# Patient Record
Sex: MALE | Race: WHITE | Employment: FULL TIME | URBAN - METROPOLITAN AREA
[De-identification: names, ages, dates, MRNs, and addresses within clinical notes are randomized per-mention and may not be internally consistent; named-entity substitution may affect disease eponyms.]

---

## 2011-05-10 LAB — PSA, EXTERNAL: 4.3

## 2017-01-30 DIAGNOSIS — G47.50 PARASOMNIA: ICD-10-CM

## 2017-01-30 RX ORDER — CLONAZEPAM 1 MG/1
1 TABLET ORAL
Qty: 90 TAB | Refills: 0 | Status: SHIPPED | OUTPATIENT
Start: 2017-01-30 | End: 2017-04-21 | Stop reason: SDUPTHER

## 2017-03-19 DIAGNOSIS — I10 BENIGN ESSENTIAL HTN: ICD-10-CM

## 2017-03-21 RX ORDER — LISINOPRIL 5 MG/1
5 TABLET ORAL DAILY
Qty: 90 TAB | Refills: 1 | Status: SHIPPED | OUTPATIENT
Start: 2017-03-21 | End: 2017-09-20 | Stop reason: SDUPTHER

## 2017-03-21 NOTE — TELEPHONE ENCOUNTER
From: Frank Saunders  To: Myranda Sndier MD  Sent: 3/19/2017 10:22 AM EDT  Subject: Medication Renewal Request    Original authorizing provider: MD Frank Paige would like a refill of the following medications:  lisinopril (PRINIVIL, ZESTRIL) 5 mg tablet Myranda Snider MD]    Preferred pharmacy: [address] [place] [address] [address]    Comment:  Dr. Kwabena Reyes, I lost track and have about two weeks worth of Cobos Beavers left, can I get a new script put in through [facility]? Most of my other prescriptions have no refills left but I have time for them. One of them is the Crestor, but I will be doing my blood test shortly and then I will make a follow up appointment with you after the cholesterol test is back.

## 2017-03-31 ENCOUNTER — HOSPITAL ENCOUNTER (OUTPATIENT)
Dept: LAB | Age: 62
Discharge: HOME | End: 2017-03-31
Payer: OTHER GOVERNMENT

## 2017-03-31 DIAGNOSIS — E78.2 MIXED HYPERCHOLESTEROLEMIA AND HYPERTRIGLYCERIDEMIA: ICD-10-CM

## 2017-03-31 DIAGNOSIS — R97.20 ELEVATED PSA: ICD-10-CM

## 2017-03-31 LAB
CHOLEST SERPL-MCNC: 201 MG/DL
HDLC SERPL-MCNC: 58 MG/DL (ref 40–60)
HDLC SERPL: 3.5 {RATIO} (ref 0–5)
LDLC SERPL CALC-MCNC: 113.4 MG/DL (ref 0–100)
LIPID PROFILE,FLP: ABNORMAL
TRIGL SERPL-MCNC: 148 MG/DL (ref ?–150)
VLDLC SERPL CALC-MCNC: 29.6 MG/DL

## 2017-03-31 PROCEDURE — 36415 COLL VENOUS BLD VENIPUNCTURE: CPT | Performed by: INTERNAL MEDICINE

## 2017-03-31 PROCEDURE — 80061 LIPID PANEL: CPT | Performed by: INTERNAL MEDICINE

## 2017-03-31 PROCEDURE — 84153 ASSAY OF PSA TOTAL: CPT | Performed by: INTERNAL MEDICINE

## 2017-03-31 PROCEDURE — 84154 ASSAY OF PSA FREE: CPT | Performed by: INTERNAL MEDICINE

## 2017-04-01 LAB
% FREE PSA, 480797: 15.9 %
PSA SERPL-MCNC: 9.3 NG/ML (ref 0–4)
PSA, FREE, 480853: 1.48 NG/ML
REFLEX CRITERIA: ABNORMAL

## 2017-04-10 RX ORDER — FLUTICASONE PROPIONATE 50 MCG
SPRAY, SUSPENSION (ML) NASAL
Qty: 48 G | Refills: 2 | Status: SHIPPED | OUTPATIENT
Start: 2017-04-10 | End: 2018-01-05 | Stop reason: SDUPTHER

## 2017-04-21 ENCOUNTER — OFFICE VISIT (OUTPATIENT)
Dept: FAMILY MEDICINE CLINIC | Age: 62
End: 2017-04-21

## 2017-04-21 VITALS
SYSTOLIC BLOOD PRESSURE: 112 MMHG | WEIGHT: 253.8 LBS | BODY MASS INDEX: 29.97 KG/M2 | HEART RATE: 76 BPM | DIASTOLIC BLOOD PRESSURE: 78 MMHG | RESPIRATION RATE: 14 BRPM | TEMPERATURE: 98.1 F | HEIGHT: 77 IN | OXYGEN SATURATION: 97 %

## 2017-04-21 DIAGNOSIS — R09.81 NASAL CONGESTION: ICD-10-CM

## 2017-04-21 DIAGNOSIS — E78.2 MIXED HYPERCHOLESTEROLEMIA AND HYPERTRIGLYCERIDEMIA: ICD-10-CM

## 2017-04-21 DIAGNOSIS — I10 BENIGN ESSENTIAL HTN: Primary | ICD-10-CM

## 2017-04-21 DIAGNOSIS — G47.50 PARASOMNIA: ICD-10-CM

## 2017-04-21 RX ORDER — CLONAZEPAM 1 MG/1
1 TABLET ORAL
Qty: 90 TAB | Refills: 0 | Status: SHIPPED | OUTPATIENT
Start: 2017-04-21 | End: 2017-07-26 | Stop reason: SDUPTHER

## 2017-04-21 RX ORDER — ESOMEPRAZOLE MAGNESIUM 40 MG/1
40 CAPSULE, DELAYED RELEASE ORAL DAILY
Qty: 90 CAP | Refills: 3 | Status: SHIPPED | OUTPATIENT
Start: 2017-04-21 | End: 2017-05-19 | Stop reason: CLARIF

## 2017-04-21 NOTE — MR AVS SNAPSHOT
Visit Information Date & Time Provider Department Dept. Phone Encounter #  
 4/21/2017  8:30 AM Anna Vasquez MD [address] [place] [address] [phone] [id number]  
  
 4/21/2017  1:30 PM  
Any with Mireya Cintron MD  
[facility] of [facility]) Appt Note: Return in about 6 months (around 4/21/2017) for Elevated PSA. [address] [address]  
[phone]  
  
   
 [address] [place] [address] Upcoming Health Maintenance Date Due Hepatitis C Screening 1955 COLONOSCOPY 6/1/2017 DTaP/Tdap/Td series (2 - Td) 1/1/2025 Allergies as of 4/21/2017  Review Complete On: 4/21/2017 By: Anna Vasquez MD  
  
 Severity Noted Reaction Type Reactions Sulfa (Sulfonamide Antibiotics)  05/04/2012    Itching Current Immunizations  Never Reviewed Name Date Influenza Vaccine 10/10/2015 Not reviewed this visit You Were Diagnosed With   
  
 Codes Comments Benign essential HTN    -  Primary ICD-10-CM: I10 
ICD-9-CM: 401.1 Parasomnia     ICD-10-CM: G47.50 ICD-9-CM: 307.47 Mixed hypercholesterolemia and hypertriglyceridemia     ICD-10-CM: E78.2 ICD-9-CM: 272.2 Vitals BP Pulse Temp Resp Height(growth percentile) Weight(growth percentile) 112/78 76 98.1 °F (36.7 °C) 14 6' 5\" (1.956 m) 253 lb 12.8 oz (115.1 kg) SpO2 BMI Smoking Status 97% 30.1 kg/m2 Former Smoker Vitals History BMI and BSA Data Body Mass Index Body Surface Area  
 30.1 kg/m 2 2.5 m 2 Preferred Pharmacy Pharmacy Name Phone [address] [facility] [phone] Your Updated Medication List  
  
   
This list is accurate as of: 4/21/17  8:40 AM.  Always use your most recent med list.  
  
  
  
  
 aspirin 81 mg tablet Take 81 mg by mouth. CENTRUM SILVER PO Take  by mouth. CIALIS 20 mg tablet Generic drug:  tadalafil Take 20 mg by mouth as needed. clonazePAM 1 mg tablet Commonly known as:  Shelba Hock Take 1 Tab by mouth nightly. Max Daily Amount: 1 mg. CRESTOR 20 mg tablet Generic drug:  rosuvastatin TAKE 1 TABLET NIGHTLY  
  
 cyclobenzaprine 10 mg tablet Commonly known as:  FLEXERIL Take 1 Tab by mouth three (3) times daily as needed for Muscle Spasm(s). Indications: MUSCLE SPASM  
  
 esomeprazole 40 mg capsule Commonly known as:  NexIUM Take 1 Cap by mouth daily. FLOMAX 0.4 mg capsule Generic drug:  tamsulosin Take 0.4 mg by mouth daily. fluticasone 50 mcg/actuation nasal spray Commonly known as:  FLONASE  
USE 2 SPRAYS IN EACH NOSTRIL DAILY HYDROcodone-acetaminophen 5-325 mg per tablet Commonly known as:  Olesya Holiday Take 1 Tab by mouth daily as needed for Pain. ibuprofen 800 mg tablet Commonly known as:  MOTRIN Take 1 Tab by mouth every eight (8) hours as needed for Pain.  
  
 ipratropium 0.06 % nasal spray Commonly known as:  ATROVENT  
2 Sprays by Both Nostrils route four (4) times daily as needed for Rhinitis. lisinopril 5 mg tablet Commonly known as:  Steve Bold Take 1 Tab by mouth daily. triamterene-hydroCHLOROthiazide 75-50 mg per tablet Commonly known as:  Shelbiana Muscat TAKE 1 TABLET DAILY Prescriptions Printed Refills  
 clonazePAM (KLONOPIN) 1 mg tablet 0 Sig: Take 1 Tab by mouth nightly. Max Daily Amount: 1 mg. Class: Print Route: Oral  
  
Prescriptions Sent to Pharmacy Refills  
 esomeprazole (NEXIUM) 40 mg capsule 3 Sig: Take 1 Cap by mouth daily. Class: Normal  
 Pharmacy: [address], [address] [place] [address] #: [phone] Route: Oral  
  
We Performed the Following SLEEP MEDICINE REFERRAL [MCA220 Custom] Comments:  
 Please evaluate patient for parasomnia. Referral Information Referral ID Referred By Referred To  
  
 [id number] [facility]   
   [address] [facility] [address] [place] [address] Phone: [phone] Fax: [phone] Visits Status Start Date End Date 1 New Request 4/21/17 4/21/18 If your referral has a status of pending review or denied, additional information will be sent to support the outcome of this decision. Introducing [facility] & HEALTH SERVICES! Dear Abdirahman Rose: Thank you for requesting a [employer] account. Our records indicate that you already have an active [employer] account. You can access your account anytime at [email or web address] Did you know that you can access your hospital and ER discharge instructions at any time in [employer]? You can also review all of your test results from your hospital stay or ER visit. Additional Information If you have questions, please visit the Frequently Asked Questions section of the [employer] website at [email or web address] Remember, [employer] is NOT to be used for urgent needs. For medical emergencies, dial 911. Now available from your iPhone and Android! Please provide this summary of care documentation to your next provider. Your primary care clinician is listed as Tree Morocho. If you have any questions after today's visit, please call [phone].

## 2017-04-21 NOTE — PROGRESS NOTES
Assessment/Plan:    1. Benign essential HTN  -cont current regimen. 2. Parasomnia  [place]  reviewed and is in accordance with patient's prescriptions   - clonazePAM (KLONOPIN) 1 mg tablet; Take 1 Tab by mouth nightly. Max Daily Amount: 1 mg. Dispense: 90 Tab; Refill: 0  - SLEEP MEDICINE REFERRAL    3. Mixed hypercholesterolemia and hypertriglyceridemia  -cont crestor. The plan was discussed with the patient. The patient verbalized understanding and is in agreement with the plan. All medication potential side effects were discussed with the patient. Health Maintenance:   Health Maintenance   Topic Date Due    Hepatitis C Screening  1955    COLONOSCOPY  06/01/2017    DTaP/Tdap/Td series (2 - Td) 01/01/2025    ZOSTER VACCINE AGE 60>  Completed    INFLUENZA AGE 9 TO ADULT  Completed       Joey Son is a 64 y.o. male and presents with Follow-up     Subjective:  HTN - bp controlled. He has started apple cider vinegar daily and thinks this is helping his bp. HLD -on crestor. No side effects. Take klonopin for parasomnia (aggressive behavior). He is requesting sleep med referral.    ROS:  Constitutional: No recent weight change. No weakness/fatigue. No f/c. Cardiovascular: No CP/palpitations. No RASCON/orthopnea/PND. Respiratory: No cough/sputum, dyspnea, wheezing. Gastointestinal: No dysphagia, reflux. No n/v. No constipation/diarrhea. No melena/rectal bleeding. Genitourinary: No dysuria, urinary hesitancy, nocturia, hematuria. No incontinence. The problem list was updated as a part of today's visit.   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 SH:  Social History   Substance Use Topics    Smoking status: Former Smoker     Quit date: 4/27/1989    Smokeless tobacco: Never Used    Alcohol use 20.4 oz/week     14 Glasses of wine, 14 Cans of beer, 6 Standard drinks or equivalent per week       Medications/Allergies:  Current Outpatient Prescriptions on File Prior to Visit   Medication Sig Dispense Refill    fluticasone (FLONASE) 50 mcg/actuation nasal spray USE 2 SPRAYS IN EACH NOSTRIL DAILY 48 g 2    CRESTOR 20 mg tablet TAKE 1 TABLET NIGHTLY 90 Tab 2    lisinopril (PRINIVIL, ZESTRIL) 5 mg tablet Take 1 Tab by mouth daily. 90 Tab 1    triamterene-hydroCHLOROthiazide (MAXZIDE) 75-50 mg per tablet TAKE 1 TABLET DAILY 90 Tab 1    clonazePAM (KLONOPIN) 1 mg tablet Take 1 Tab by mouth nightly. Max Daily Amount: 1 mg. 90 Tab 0    NEXIUM 40 mg capsule       HYDROcodone-acetaminophen (NORCO) 5-325 mg per tablet Take 1 Tab by mouth daily as needed for Pain. 12 Tab 0    ibuprofen (MOTRIN) 800 mg tablet Take 1 Tab by mouth every eight (8) hours as needed for Pain. 90 Tab 0    cyclobenzaprine (FLEXERIL) 10 mg tablet Take 1 Tab by mouth three (3) times daily as needed for Muscle Spasm(s). Indications: MUSCLE SPASM 90 Tab 0    ipratropium (ATROVENT) 0.06 % nasal spray 2 Sprays by Both Nostrils route four (4) times daily as needed for Rhinitis. 45 mL 1    tamsulosin (FLOMAX) 0.4 mg capsule Take 0.4 mg by mouth daily.  tadalafil (CIALIS) 20 mg tablet Take 20 mg by mouth as needed.  MULTIVITAMIN W-MINERALS/LUTEIN (CENTRUM SILVER PO) Take  by mouth.  aspirin 81 mg tablet Take 81 mg by mouth. No current facility-administered medications on file prior to visit.          Allergies   Allergen Reactions    Sulfa (Sulfonamide Antibiotics) Itching       Objective:  Visit Vitals    Pulse 76    Temp 98.1 °F (36.7 °C)    Resp 14  Ht 6' 5\" (1.956 m)    Wt 253 lb 12.8 oz (115.1 kg)    SpO2 97%    BMI 30.1 kg/m2      Constitutional: Well developed, nourished, no distress, alert, obese habitus   CV: S1, S2.  RRR. No murmurs/rubs. No thrills palpated. No carotid bruits. Intact distal pulses. No edema. Pulm: No abnormalities on inspection. Clear to auscultation bilaterally. No wheezing/rhonchi. Normal effort. GI: Soft, nontender, nondistended. Normal active bowel sounds. Neuro: A/O x 3. No focal motor or sensory deficits. Speech normal.   Psych: Appropriate affect, judgement and insight. Short-term memory intact. Labwork and Ancillary Studies:    CBC w/Diff  Lab Results   Component Value Date/Time    WBC 7.6 10/14/2016 08:29 AM    HGB 15.9 10/14/2016 08:29 AM    PLATELET 361 22/31/4987 08:29 AM         Basic Metabolic Profile/LFTs  Lab Results   Component Value Date/Time    Sodium 140 10/14/2016 08:29 AM    Potassium 4.0 10/14/2016 08:29 AM    Chloride 102 10/14/2016 08:29 AM    CO2 30 10/14/2016 08:29 AM    Anion gap 8 10/14/2016 08:29 AM    Glucose 95 10/14/2016 08:29 AM    BUN 24 10/14/2016 08:29 AM    Creatinine 1.29 10/14/2016 08:29 AM    BUN/Creatinine ratio 19 10/14/2016 08:29 AM    GFR est AA >60 10/14/2016 08:29 AM    GFR est non-AA 57 10/14/2016 08:29 AM    Calcium 9.1 10/14/2016 08:29 AM      Lab Results   Component Value Date/Time    ALT (SGPT) 40 10/14/2016 08:29 AM    AST (SGOT) 25 10/14/2016 08:29 AM    Alk.  phosphatase 73 10/14/2016 08:29 AM    Bilirubin, total 0.6 10/14/2016 08:29 AM       Cholesterol  Lab Results   Component Value Date/Time    Cholesterol, total 201 03/31/2017 08:02 AM    HDL Cholesterol 58 03/31/2017 08:02 AM    LDL, calculated 113.4 03/31/2017 08:02 AM    Triglyceride 148 03/31/2017 08:02 AM    CHOL/HDL Ratio 3.5 03/31/2017 08:02 AM

## 2017-04-21 NOTE — PROGRESS NOTES
Rajesh Bowling is a 64 y.o. male here today for follow-up. 1. Have you been to the ER, urgent care clinic since your last visit? Hospitalized since your last visit? No    2. Have you seen or consulted any other health care providers outside of the [address] [place] [address] since your last visit? Include any pap smears or colon screening.  No

## 2017-05-19 RX ORDER — PANTOPRAZOLE SODIUM 40 MG/1
40 TABLET, DELAYED RELEASE ORAL DAILY
Qty: 90 TAB | Refills: 3 | Status: SHIPPED | OUTPATIENT
Start: 2017-05-19 | End: 2018-04-26 | Stop reason: SDUPTHER

## 2017-07-26 DIAGNOSIS — G47.50 PARASOMNIA: ICD-10-CM

## 2017-07-26 RX ORDER — CLONAZEPAM 1 MG/1
1 TABLET ORAL
Qty: 90 TAB | Refills: 0 | Status: SHIPPED | OUTPATIENT
Start: 2017-07-26 | End: 2017-09-20 | Stop reason: SDUPTHER

## 2017-07-26 NOTE — TELEPHONE ENCOUNTER
From: Sarah Maillard  To: David Lott MD  Sent: 7/26/2017 2:13 PM EDT  Subject: Medication Renewal Request    Original authorizing provider: MD Vance Santos Randal. Katia Gaxiola would like a refill of the following medications:  clonazePAM (KLONOPIN) 1 mg tablet David Lott MD]    Preferred pharmacy: [place]    Comment:  Dr Ida Reddy, I am down to about 10 days worth of this script. Can you refill through [facility]?

## 2017-08-29 DIAGNOSIS — Z11.59 SPECIAL SCREENING EXAMINATION FOR VIRAL DISEASE: ICD-10-CM

## 2017-08-29 DIAGNOSIS — R97.20 ELEVATED PROSTATE SPECIFIC ANTIGEN (PSA): Primary | ICD-10-CM

## 2017-08-29 DIAGNOSIS — E78.2 MIXED HYPERCHOLESTEROLEMIA AND HYPERTRIGLYCERIDEMIA: ICD-10-CM

## 2017-09-01 ENCOUNTER — HOSPITAL ENCOUNTER (OUTPATIENT)
Dept: LAB | Age: 62
Discharge: HOME | End: 2017-09-01
Payer: OTHER GOVERNMENT

## 2017-09-01 DIAGNOSIS — Z11.59 SPECIAL SCREENING EXAMINATION FOR VIRAL DISEASE: ICD-10-CM

## 2017-09-01 DIAGNOSIS — E78.2 MIXED HYPERCHOLESTEROLEMIA AND HYPERTRIGLYCERIDEMIA: ICD-10-CM

## 2017-09-01 LAB
CHOLEST SERPL-MCNC: 204 MG/DL
HDLC SERPL-MCNC: 61 MG/DL (ref 40–60)
HDLC SERPL: 3.3 {RATIO} (ref 0–5)
LDLC SERPL CALC-MCNC: 98 MG/DL (ref 0–100)
LIPID PROFILE,FLP: ABNORMAL
TRIGL SERPL-MCNC: 225 MG/DL (ref ?–150)
VLDLC SERPL CALC-MCNC: 45 MG/DL

## 2017-09-01 PROCEDURE — 36415 COLL VENOUS BLD VENIPUNCTURE: CPT | Performed by: INTERNAL MEDICINE

## 2017-09-01 PROCEDURE — 80061 LIPID PANEL: CPT | Performed by: INTERNAL MEDICINE

## 2017-09-01 PROCEDURE — 84153 ASSAY OF PSA TOTAL: CPT | Performed by: INTERNAL MEDICINE

## 2017-09-01 PROCEDURE — 86803 HEPATITIS C AB TEST: CPT | Performed by: INTERNAL MEDICINE

## 2017-09-02 LAB
HCV AB S/CO SERPL IA: <0.1 S/CO RATIO (ref 0–0.9)
HCV AB SERPL QL IA: NORMAL
PSA SERPL-MCNC: 10.8 NG/ML (ref 0–4)
REFLEX CRITERIA: ABNORMAL

## 2017-09-20 ENCOUNTER — OFFICE VISIT (OUTPATIENT)
Dept: FAMILY MEDICINE CLINIC | Age: 62
End: 2017-09-20

## 2017-09-20 VITALS
WEIGHT: 258.2 LBS | RESPIRATION RATE: 18 BRPM | HEIGHT: 77 IN | HEART RATE: 73 BPM | TEMPERATURE: 98.2 F | DIASTOLIC BLOOD PRESSURE: 70 MMHG | SYSTOLIC BLOOD PRESSURE: 132 MMHG | OXYGEN SATURATION: 97 % | BODY MASS INDEX: 30.49 KG/M2

## 2017-09-20 DIAGNOSIS — G89.29 CHRONIC BILATERAL LOW BACK PAIN WITHOUT SCIATICA: ICD-10-CM

## 2017-09-20 DIAGNOSIS — G47.50 PARASOMNIA: Primary | ICD-10-CM

## 2017-09-20 DIAGNOSIS — M54.50 CHRONIC BILATERAL LOW BACK PAIN WITHOUT SCIATICA: ICD-10-CM

## 2017-09-20 DIAGNOSIS — Z12.11 SCREEN FOR COLON CANCER: ICD-10-CM

## 2017-09-20 DIAGNOSIS — M54.2 NECK PAIN: ICD-10-CM

## 2017-09-20 DIAGNOSIS — I10 BENIGN ESSENTIAL HTN: ICD-10-CM

## 2017-09-20 DIAGNOSIS — E78.2 MIXED HYPERCHOLESTEROLEMIA AND HYPERTRIGLYCERIDEMIA: ICD-10-CM

## 2017-09-20 RX ORDER — LISINOPRIL 5 MG/1
5 TABLET ORAL DAILY
Qty: 90 TAB | Refills: 1 | Status: SHIPPED | OUTPATIENT
Start: 2017-09-20 | End: 2018-01-30 | Stop reason: SDUPTHER

## 2017-09-20 RX ORDER — CLONAZEPAM 0.5 MG/1
0.5 TABLET ORAL
Qty: 90 TAB | Refills: 1
Start: 2017-11-09 | End: 2018-01-08 | Stop reason: SDUPTHER

## 2017-09-20 RX ORDER — CLONAZEPAM 1 MG/1
1 TABLET ORAL
Qty: 90 TAB | Refills: 1 | Status: SHIPPED | OUTPATIENT
Start: 2017-09-20 | End: 2017-09-20 | Stop reason: SDUPTHER

## 2017-09-20 RX ORDER — HYDROCODONE BITARTRATE AND ACETAMINOPHEN 5; 325 MG/1; MG/1
1 TABLET ORAL
Qty: 30 TAB | Refills: 0 | Status: SHIPPED | OUTPATIENT
Start: 2017-09-20 | End: 2017-12-19

## 2017-09-20 RX ORDER — CYCLOBENZAPRINE HCL 10 MG
10 TABLET ORAL
Qty: 90 TAB | Refills: 0 | Status: SHIPPED | OUTPATIENT
Start: 2017-09-20 | End: 2018-07-30 | Stop reason: SDUPTHER

## 2017-09-20 NOTE — PROGRESS NOTES
Kisha Munoz is a 64 y.o. male (: 1955) presenting to address:Medication evaluation and refills    Chief Complaint   Patient presents with    Medication Refill    Medication Evaluation       Vitals:    17 1056   BP: 132/70   Pulse: 73   Resp: 18   Temp: 98.2 °F (36.8 °C)   TempSrc: Oral   SpO2: 97%   Weight: 258 lb 3.2 oz (117.1 kg)   Height: 6' 5\" (1.956 m)   PainSc:   2   PainLoc: Back       Hearing/Vision:   No exam data present    Learning Assessment:     Learning Assessment 2015   PRIMARY LEARNER Patient   HIGHEST LEVEL OF EDUCATION - PRIMARY LEARNER  SOME COLLEGE   PRIMARY LANGUAGE ENGLISH   LEARNER PREFERENCE PRIMARY LISTENING   ANSWERED BY patient   RELATIONSHIP SELF     Depression Screening:     PHQ over the last two weeks 2017   Little interest or pleasure in doing things Not at all   Feeling down, depressed or hopeless Not at all   Total Score PHQ 2 0     Fall Risk Assessment:   No flowsheet data found. Abuse Screening:   No flowsheet data found. Coordination of Care Questionaire:   1. Have you been to the ER, urgent care clinic since your last visit? Hospitalized since your last visit? no    2. Have you seen or consulted any other health care providers outside of the [address] [place] [address] since your last visit? Include any pap smears or colon screening. ENT, sleep specialist    Advanced Directive:   1. Do you have an Advanced Directive? no    2. Would you like information on Advanced Directives?  No

## 2017-09-20 NOTE — MR AVS SNAPSHOT
Visit Information Date & Time Provider Department Dept. Phone Encounter #  
 9/20/2017 11:15 AM Jeny Andrade MD [facility] [phone] [id number]  
  
 4/20/2018  1:00 PM  
Any with Mohit Andrade MD  
[facility] of [facility]) Appt Note: Return in about 1 year (around 4/21/2018) for Elevated PSA -- PSA 1 wk prior to f/u.  
 [address] [place] [address]  
[phone]  
  
   
 [address] [address] Upcoming Health Maintenance Date Due COLONOSCOPY 6/1/2017 DTaP/Tdap/Td series (2 - Td) 1/1/2025 Allergies as of 9/20/2017  Review Complete On: 9/20/2017 By: Jeny Andrade MD  
  
 Severity Noted Reaction Type Reactions Sulfa (Sulfonamide Antibiotics)  05/04/2012    Itching Current Immunizations  Never Reviewed Name Date Influenza Vaccine 9/12/2017, 10/10/2015 Not reviewed this visit You Were Diagnosed With   
  
 Codes Comments Parasomnia    -  Primary ICD-10-CM: G55.47 ICD-9-CM: 307.47 Mixed hypercholesterolemia and hypertriglyceridemia     ICD-10-CM: E78.2 ICD-9-CM: 272.2 Benign essential HTN     ICD-10-CM: I10 
ICD-9-CM: 515. 1 Chronic bilateral low back pain without sciatica     ICD-10-CM: M54.5, G89.29 ICD-9-CM: 724.2, 338.29 Neck pain     ICD-10-CM: M54.2 ICD-9-CM: 723.1 Screen for colon cancer     ICD-10-CM: Z12.11 ICD-9-CM: V76.51 Vitals BP Pulse Temp Resp Height(growth percentile) Weight(growth percentile) 132/70 (BP 1 Location: Left arm, BP Patient Position: Sitting) 73 98.2 °F (36.8 °C) (Oral) 18 6' 5\" (1.956 m) 258 lb 3.2 oz (117.1 kg) SpO2 BMI Smoking Status 97% 30.62 kg/m2 Former Smoker BMI and BSA Data Body Mass Index Body Surface Area  
 30.62 kg/m 2 2.52 m 2 Preferred Pharmacy Pharmacy Name Phone  [address] [phone] Your Updated Medication List  
  
   
This list is accurate as of: 9/20/17 11:29 AM.  Always use your most recent med list.  
  
  
  
  
 aspirin 81 mg tablet Take 81 mg by mouth. CENTRUM SILVER PO Take  by mouth. CIALIS 20 mg tablet Generic drug:  tadalafil Take 20 mg by mouth as needed. clonazePAM 0.5 mg tablet Commonly known as:  Adrianna Marquezald Take 1 Tab by mouth nightly. Max Daily Amount: 0.5 mg.  
Start taking on:  11/9/2017 CRESTOR 20 mg tablet Generic drug:  rosuvastatin TAKE 1 TABLET NIGHTLY  
  
 cyclobenzaprine 10 mg tablet Commonly known as:  FLEXERIL Take 1 Tab by mouth three (3) times daily as needed for Muscle Spasm(s). Indications: MUSCLE SPASM FLOMAX 0.4 mg capsule Generic drug:  tamsulosin Take 0.4 mg by mouth daily. fluticasone 50 mcg/actuation nasal spray Commonly known as:  FLONASE  
USE 2 SPRAYS IN EACH NOSTRIL DAILY HYDROcodone-acetaminophen 5-325 mg per tablet Commonly known as:  Maikel Escudero Take 1 Tab by mouth daily as needed for Pain for up to 90 days. ibuprofen 800 mg tablet Commonly known as:  MOTRIN Take 1 Tab by mouth every eight (8) hours as needed for Pain.  
  
 ipratropium 0.06 % nasal spray Commonly known as:  ATROVENT  
2 Sprays by Both Nostrils route four (4) times daily as needed for Rhinitis. lisinopril 5 mg tablet Commonly known as:  Kieran Hank Take 1 Tab by mouth daily. pantoprazole 40 mg tablet Commonly known as:  PROTONIX Take 1 Tab by mouth daily. triamterene-hydroCHLOROthiazide 75-50 mg per tablet Commonly known as:  India Pluck TAKE 1 TABLET DAILY Prescriptions Printed Refills HYDROcodone-acetaminophen (NORCO) 5-325 mg per tablet 0 Sig: Take 1 Tab by mouth daily as needed for Pain for up to 90 days. Class: Print Route: Oral  
  
Prescriptions Sent to Pharmacy Refills lisinopril (PRINIVIL, ZESTRIL) 5 mg tablet 1 Sig: Take 1 Tab by mouth daily. Class: Normal  
 Pharmacy: [address], [address] Ph #: [phone] Route: Oral  
 cyclobenzaprine (FLEXERIL) 10 mg tablet 0 Sig: Take 1 Tab by mouth three (3) times daily as needed for Muscle Spasm(s). Indications: MUSCLE SPASM Class: Normal  
 Pharmacy: [address], [address] Ph #: [phone] Route: Oral  
  
We Performed the Following COLOGUARD TEST (FECAL DNA COLORECTAL CANCER SCREENING) [34034 CPT(R)] Patient Instructions CPT Code 52865 Call [phone] [email or web address] Introducing [facility]! Dear Estrada Fairchild: Thank you for requesting a [employer] account. Our records indicate that you already have an active [employer] account. You can access your account anytime at [email or web address] Did you know that you can access your hospital and ER discharge instructions at any time in [employer]? You can also review all of your test results from your hospital stay or ER visit. Additional Information If you have questions, please visit the Frequently Asked Questions section of the [employer] website at [email or web address] Remember, [employer] is NOT to be used for urgent needs. For medical emergencies, dial 911. Now available from your iPhone and Android! Please provide this summary of care documentation to your next provider. Your primary care clinician is listed as Tree Morocho. If you have any questions after today's visit, please call [phone].

## 2017-09-20 NOTE — PROGRESS NOTES
Assessment/Plan:    1. Parasomnia  -refilled. Has controlled substance contract on file. - clonazePAM (KLONOPIN) 0.5 mg tablet; Take 1 Tab by mouth nightly. Max Daily Amount: 0.5 mg.  Dispense: 90 Tab; Refill: 1    2. Mixed hypercholesterolemia and hypertriglyceridemia  -cont statin    3. Benign essential HTN  -refilled  - lisinopril (PRINIVIL, ZESTRIL) 5 mg tablet; Take 1 Tab by mouth daily. Dispense: 90 Tab; Refill: 1    4. Chronic bilateral low back pain without sciatica  -refilled. Discussed risk of sedation, overdose, death with concomitant use of narcotics and benzos. Pt wishes to continue, with ultimate goal of coming off klonopin.  - HYDROcodone-acetaminophen (NORCO) 5-325 mg per tablet; Take 1 Tab by mouth daily as needed for Pain for up to 90 days. Dispense: 30 Tab; Refill: 0    5. Neck pain  -refilled  - cyclobenzaprine (FLEXERIL) 10 mg tablet; Take 1 Tab by mouth three (3) times daily as needed for Muscle Spasm(s). Indications: MUSCLE SPASM  Dispense: 90 Tab; Refill: 0    6. Screen for colon cancer  - COLOGUARD TEST (FECAL DNA COLORECTAL CANCER SCREENING)    The plan was discussed with the patient. The patient verbalized understanding and is in agreement with the plan. All medication potential side effects were discussed with the patient. Health Maintenance:   Health Maintenance   Topic Date Due    COLONOSCOPY  06/01/2017    DTaP/Tdap/Td series (2 - Td) 01/01/2025    Hepatitis C Screening  Completed    ZOSTER VACCINE AGE 60>  Completed    INFLUENZA AGE 9 TO ADULT  Completed       Nilsa Bar is a 64 y.o. male and presents with Medication Refill and Medication Evaluation     Subjective:  parasonmnia - on klonopin. Has controlled substance contract on file. He cut dose in half, on advice of Dr. Malen Gosselin, b/c of morning sleepiness. He is hoping once he has well fitting eduardo mask, he may not need klonopin (has dreams that he then acts out on). HLD - labs improved. On crestor.   No side effects. Chronic low back pain -  Takes oxycodone prn for when \"his black goes out\". Uses it about 8x/mo. He uses flexeril prn as well. ROS:  Constitutional: No recent weight change. No weakness/fatigue. No f/c. Cardiovascular: No CP/palpitations. No RASCON/orthopnea/PND. Respiratory: No cough/sputum, dyspnea, wheezing. Gastointestinal: No dysphagia, reflux. No n/v. No constipation/diarrhea. No melena/rectal bleeding. Genitourinary: No dysuria, urinary hesitancy, nocturia, hematuria. No incontinence. Neurological: No seizures/numbness/weakness. No paresthesias. Psychiatric:  No depression, anxiety. The problem list was updated as a part of today's visit. 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 SH:  Social History   Substance Use Topics    Smoking status: Former Smoker     Quit date: 4/27/1989    Smokeless tobacco: Never Used    Alcohol use 20.4 oz/week     14 Glasses of wine, 14 Cans of beer, 6 Standard drinks or equivalent per week       Medications/Allergies:  Current Outpatient Prescriptions on File Prior to Visit   Medication Sig Dispense Refill    clonazePAM (KLONOPIN) 1 mg tablet Take 1 Tab by mouth nightly. Max Daily Amount: 1 mg. 90 Tab 0    pantoprazole (PROTONIX) 40 mg tablet Take 1 Tab by mouth daily.  90 Tab 3    fluticasone (FLONASE) 50 mcg/actuation nasal spray USE 2 SPRAYS IN EACH NOSTRIL DAILY 48 g 2    CRESTOR 20 mg tablet TAKE 1 TABLET NIGHTLY 90 Tab 2    lisinopril (PRINIVIL, ZESTRIL) 5 mg tablet Take 1 Tab by mouth daily. 90 Tab 1    triamterene-hydroCHLOROthiazide (MAXZIDE) 75-50 mg per tablet TAKE 1 TABLET DAILY 90 Tab 1    HYDROcodone-acetaminophen (NORCO) 5-325 mg per tablet Take 1 Tab by mouth daily as needed for Pain. 12 Tab 0    ibuprofen (MOTRIN) 800 mg tablet Take 1 Tab by mouth every eight (8) hours as needed for Pain. 90 Tab 0    cyclobenzaprine (FLEXERIL) 10 mg tablet Take 1 Tab by mouth three (3) times daily as needed for Muscle Spasm(s). Indications: MUSCLE SPASM 90 Tab 0    ipratropium (ATROVENT) 0.06 % nasal spray 2 Sprays by Both Nostrils route four (4) times daily as needed for Rhinitis. 45 mL 1    tamsulosin (FLOMAX) 0.4 mg capsule Take 0.4 mg by mouth daily.  tadalafil (CIALIS) 20 mg tablet Take 20 mg by mouth as needed.  MULTIVITAMIN W-MINERALS/LUTEIN (CENTRUM SILVER PO) Take  by mouth.  aspirin 81 mg tablet Take 81 mg by mouth. No current facility-administered medications on file prior to visit. Allergies   Allergen Reactions    Sulfa (Sulfonamide Antibiotics) Itching       Objective:  Visit Vitals    /70 (BP 1 Location: Left arm, BP Patient Position: Sitting)    Pulse 73    Temp 98.2 °F (36.8 °C) (Oral)    Resp 18    Ht 6' 5\" (1.956 m)    Wt 258 lb 3.2 oz (117.1 kg)    SpO2 97%    BMI 30.62 kg/m2      Constitutional: Well developed, nourished, no distress, alert, obese habitus   CV: S1, S2.  RRR. No murmurs/rubs. No thrills palpated. No carotid bruits. Intact distal pulses. No edema. Pulm: No abnormalities on inspection. Clear to auscultation bilaterally. No wheezing/rhonchi. Normal effort. GI: Soft, nontender, nondistended. Normal active bowel sounds.       Labwork and Ancillary Studies:      Cholesterol  Lab Results   Component Value Date/Time    Cholesterol, total 204 09/01/2017 07:29 AM    HDL Cholesterol 61 09/01/2017 07:29 AM    LDL, calculated 98 09/01/2017 07:29 AM    Triglyceride 225 09/01/2017 07:29 AM    CHOL/HDL Ratio 3.3 09/01/2017 07:29 AM

## 2017-11-24 ENCOUNTER — OFFICE VISIT (OUTPATIENT)
Dept: FAMILY MEDICINE CLINIC | Age: 62
End: 2017-11-24

## 2017-11-24 VITALS
BODY MASS INDEX: 30.46 KG/M2 | HEIGHT: 77 IN | TEMPERATURE: 97 F | HEART RATE: 75 BPM | DIASTOLIC BLOOD PRESSURE: 75 MMHG | RESPIRATION RATE: 20 BRPM | WEIGHT: 258 LBS | OXYGEN SATURATION: 96 % | SYSTOLIC BLOOD PRESSURE: 131 MMHG

## 2017-11-24 DIAGNOSIS — G89.29 CHRONIC NECK PAIN: ICD-10-CM

## 2017-11-24 DIAGNOSIS — M54.2 CHRONIC NECK PAIN: ICD-10-CM

## 2017-11-24 DIAGNOSIS — J01.00 SUBACUTE MAXILLARY SINUSITIS: Primary | ICD-10-CM

## 2017-11-24 RX ORDER — AZITHROMYCIN 250 MG/1
TABLET, FILM COATED ORAL
Qty: 6 TAB | Refills: 0 | Status: SHIPPED | OUTPATIENT
Start: 2017-11-24 | End: 2017-12-14 | Stop reason: ALTCHOICE

## 2017-11-24 NOTE — PROGRESS NOTES
Nevaeh Schneider is a 64 y.o. male (: 1955) presenting to address:    Chief Complaint   Patient presents with    Headache    Sinus Infection     bout 3 days       Vitals:    17 1332   Weight: 258 lb (117 kg)   Height: 6' 5\" (1.956 m)   PainSc:   4   PainLoc: Head       Hearing/Vision:   No exam data present    Learning Assessment:     Learning Assessment 2015   PRIMARY LEARNER Patient   HIGHEST LEVEL OF EDUCATION - PRIMARY LEARNER  SOME COLLEGE   PRIMARY LANGUAGE ENGLISH   LEARNER PREFERENCE PRIMARY LISTENING   ANSWERED BY patient   RELATIONSHIP SELF     Depression Screening:     PHQ over the last two weeks 2017   Little interest or pleasure in doing things Not at all   Feeling down, depressed or hopeless Not at all   Total Score PHQ 2 0     Fall Risk Assessment:   No flowsheet data found. Abuse Screening:   No flowsheet data found. Coordination of Care Questionaire:   1. Have you been to the ER, urgent care clinic since your last visit? Hospitalized since your last visit? NO    2. Have you seen or consulted any other health care providers outside of the [address] [place] [address] since your last visit? Include any pap smears or colon screening. NO    Advanced Directive:   1. Do you have an Advanced Directive? NO    2. Would you like information on Advanced Directives?  NO

## 2017-11-24 NOTE — PROGRESS NOTES
[address]  Primary Care Office Visit - Problem-Oriented    : 1955   Chapo Morris is a 64 y.o. male presenting for  Chief Complaint   Patient presents with    Headache    Sinus Infection     bout 3 days       Assessment/Plan:       ICD-10-CM   1. Subacute maxillary sinusitis - initial encounter  - encouraged to continue current symptomatic tx for another 24-48h, and if significantly worsening sx or no better, may start ABx (paper Rx given)     J01.00   2. Chronic neck pain - initial encounter, myofascial, likely 2/2 poor ergonomics  - HEP given  - encouraged improved ergonomics M54.2  G89.29       Orders Placed This Encounter    azithromycin (ZITHROMAX) 250 mg tablet     Sig: Take 2 tablets today, then take 1 tablet daily     Dispense:  6 Tab     Refill:  0         This document may have been created with the aid of dictation software. Text may contain errors, particularly phonetic errors. Reviewed management plan & instructions with patient, who voiced understanding. Ata Belcher MD  Internal Medicine, Family Medicine & Sports Medicine  2017, 4:51 PM    Patient Instructions (provided in AVS):     Continue with current regimen. If not better in the next 24-48 hours, get zpak and take until completed. Neck: Exercises    History:   Chapo Morris is a 64 y.o. male presenting to address:  Chief Complaint   Patient presents with    Headache    Sinus Infection     bout 3 days       S/p deviated septum surgery in Oct.  Prior to that, 2-3x/year sinus infections that would require ABx, usually treated at [facility] or other office outside of our practice. 3 days of increasing sinus pressure, assoc sinus HA, worse when leaning forward. Has been taking flonase regularly. When sx worsen, then adds intranasal ipratropium. If that still doesn't work, add 24 antihistamine. .. Which he did all of these and still has ongoing symptoms. No fevers/chills.   No ear pain.  No sore throat. No cough. Last ABx for sinus infection per patient reports was a zpak, and was clinically effective. Also notes some B neck pain, worse with poor ergonomics for watching football on his cell phone, or working at the computer. No numbness/tingling. No  weakness. Past Medical History:   Diagnosis Date    Back pain     Benign prostatic hyperplasia     BPH associated with nocturia     Colon polyp     Elevated PSA     Esophageal reflux     Headache(784.0)     Heartburn     HTN (hypertension)     Hypercholesterolemia     Hypertrophy of prostate without urinary obstruction and other lower urinary tract symptoms (LUTS)     Medial epicondylitis of elbow     Medial epicondylitis of elbow     Nocturia     Skin tags, anus or rectum     Unspecified cerebral artery occlusion with cerebral infarction     1991     Past Surgical History:   Procedure Laterality Date    HX APPENDECTOMY      HX HEENT  10/2017    Deviated Septum repair    HX MENISCECTOMY      HX ORTHOPAEDIC      right arm, right knee    HX UROLOGICAL  8/24/07    PNBx-TRUS Vol 10.2 cc's, Benign (16 cores)     HX UROLOGICAL  7/15/13    PNBx-TRUS Vol  72.3 cc's, Benign, Dr. Pérez Pellet    XR MANDIBLE COMPLETE        reports that he quit smoking about 28 years ago. He has never used smokeless tobacco. He reports that he drinks about 20.4 oz of alcohol per week  He reports that he does not use illicit drugs.   Social History     Social History Narrative     History   Smoking Status    Former Smoker    Quit date: 4/27/1989   Smokeless Tobacco    Never Used     Family History   Problem Relation Age of Onset    Hypertension Father     Heart Disease Father     Glaucoma Mother     Cancer Neg Hx      Allergies   Allergen Reactions    Sulfa (Sulfonamide Antibiotics) Itching       Problem List:      Patient Active Problem List    Diagnosis    Heavy alcohol use    Gastroesophageal reflux disease without esophagitis    Parasomnia     On klonopin. Controlled substance contract on file      Psoriasiform dermatitis     Managed by derm      Benign essential HTN    Mixed hypercholesterolemia and hypertriglyceridemia    Chronic low back pain    BPH (benign prostatic hypertrophy)    Medial epicondylitis of elbow    Elevated prostate specific antigen (PSA)     Neg biopsies in past, followed by urology         Medications:     Current Outpatient Prescriptions   Medication Sig    azithromycin (ZITHROMAX) 250 mg tablet Take 2 tablets today, then take 1 tablet daily    tamsulosin (FLOMAX) 0.4 mg capsule Take 1 Cap by mouth daily.  lisinopril (PRINIVIL, ZESTRIL) 5 mg tablet Take 1 Tab by mouth daily.  clonazePAM (KLONOPIN) 0.5 mg tablet Take 1 Tab by mouth nightly. Max Daily Amount: 0.5 mg.    HYDROcodone-acetaminophen (NORCO) 5-325 mg per tablet Take 1 Tab by mouth daily as needed for Pain for up to 90 days.  cyclobenzaprine (FLEXERIL) 10 mg tablet Take 1 Tab by mouth three (3) times daily as needed for Muscle Spasm(s). Indications: MUSCLE SPASM    pantoprazole (PROTONIX) 40 mg tablet Take 1 Tab by mouth daily.  fluticasone (FLONASE) 50 mcg/actuation nasal spray USE 2 SPRAYS IN EACH NOSTRIL DAILY    CRESTOR 20 mg tablet TAKE 1 TABLET NIGHTLY    triamterene-hydroCHLOROthiazide (MAXZIDE) 75-50 mg per tablet TAKE 1 TABLET DAILY    ibuprofen (MOTRIN) 800 mg tablet Take 1 Tab by mouth every eight (8) hours as needed for Pain.  ipratropium (ATROVENT) 0.06 % nasal spray 2 Sprays by Both Nostrils route four (4) times daily as needed for Rhinitis.  tadalafil (CIALIS) 20 mg tablet Take 20 mg by mouth as needed.  MULTIVITAMIN W-MINERALS/LUTEIN (CENTRUM SILVER PO) Take  by mouth.  aspirin 81 mg tablet Take 81 mg by mouth. No current facility-administered medications for this visit. Review of Systems:     Review of Systems   Constitutional: Negative for chills and fever.    HENT: Positive for congestion and sinus pain. Negative for ear discharge, ear pain, hearing loss and tinnitus. Eyes: Negative for discharge. Respiratory: Negative for cough. Cardiovascular: Negative for chest pain. Musculoskeletal: Positive for neck pain. Neurological: Positive for headaches. Negative for tingling, tremors, sensory change and focal weakness. Physical Assessment:   VS:    Vitals:    11/24/17 1332   BP: 131/75   Pulse: 75   Resp: 20   Temp: 97 °F (36.1 °C)   TempSrc: Oral   SpO2: 96%   Weight: 258 lb (117 kg)   Height: 6' 5\" (1.956 m)   PainSc:   4   PainLoc: Head       Physical Exam   Constitutional: He is oriented to person, place, and time. He appears well-developed and well-nourished. No distress. + overweight   HENT:   Head: Normocephalic and atraumatic. Right Ear: Tympanic membrane, external ear and ear canal normal.   Left Ear: Tympanic membrane, external ear and ear canal normal.   Nose: Mucosal edema and rhinorrhea present. Right sinus exhibits maxillary sinus tenderness (mild). Left sinus exhibits maxillary sinus tenderness (mild). Mouth/Throat: Oropharynx is clear and moist. No oropharyngeal exudate or posterior oropharyngeal edema. + myofascial tenderness B cervicalis m   Eyes: EOM are normal. Pupils are equal, round, and reactive to light. Neck: Normal range of motion. Neck supple. Cardiovascular: Normal rate, regular rhythm and normal heart sounds. No murmur heard. Pulmonary/Chest: Effort normal and breath sounds normal. No respiratory distress. He has no wheezes. Abdominal: Soft. Bowel sounds are normal. There is no tenderness. There is no rebound. Musculoskeletal: Normal range of motion. Cervical back: He exhibits normal range of motion, no bony tenderness, no swelling and no deformity. Neurological: He is alert and oriented to person, place, and time. No cranial nerve deficit. Skin: Skin is warm and dry. He is not diaphoretic.    Psychiatric: He has a normal mood and affect. His behavior is normal. Judgment and thought content normal.   Nursing note reviewed.

## 2017-11-24 NOTE — PATIENT INSTRUCTIONS
Continue with current regimen. If not better in the next 24-48 hours, get zpak and take until completed. Neck: Exercises  Your Care Instructions  Here are some examples of typical rehabilitation exercises for your condition. Start each exercise slowly. Ease off the exercise if you start to have pain. Your doctor or physical therapist will tell you when you can start these exercises and which ones will work best for you. How to do the exercises  Neck stretch    1. This stretch works best if you keep your shoulder down as you lean away from it. To help you remember to do this, start by relaxing your shoulders and lightly holding on to your thighs or your chair. 2. Tilt your head toward your shoulder and hold for 15 to 30 seconds. Let the weight of your head stretch your muscles. 3. If you would like a little added stretch, use your hand to gently and steadily pull your head toward your shoulder. For example, keeping your right shoulder down, lean your head to the left. 4. Repeat 2 to 4 times toward each shoulder. Diagonal neck stretch    1. Turn your head slightly toward the direction you will be stretching, and tilt your head diagonally toward your chest and hold for 15 to 30 seconds. 2. If you would like a little added stretch, use your hand to gently and steadily pull your head forward on the diagonal.  3. Repeat 2 to 4 times toward each side. Dorsal glide stretch    The dorsal glide stretches the back of the neck. If you feel pain, do not glide so far back. Some people find this exercise easier to do while lying on their backs with an ice pack on the neck. 1. Sit or stand tall and look straight ahead. 2. Slowly tuck your chin as you glide your head backward over your body  3. Hold for a count of 6, and then relax for up to 10 seconds. 4. Repeat 8 to 12 times. Chest and shoulder stretch    1. Sit or stand tall and glide your head backward as in the dorsal glide stretch.   2. Raise both arms so that your hands are next to your ears. 3. Take a deep breath, and as you breathe out, lower your elbows down and behind your back. You will feel your shoulder blades slide down and together, and at the same time you will feel a stretch across your chest and the front of your shoulders. 4. Hold for about 6 seconds, and then relax for up to 10 seconds. 5. Repeat 8 to 12 times. Strengthening: Hands on head    1. Move your head backward, forward, and side to side against gentle pressure from your hands, holding each position for about 6 seconds. 2. Repeat 8 to 12 times. Follow-up care is a key part of your treatment and safety. Be sure to make and go to all appointments, and call your doctor if you are having problems. It's also a good idea to know your test results and keep a list of the medicines you take. Where can you learn more? Go to http://yomaira-ruth.info/. Enter P975 in the search box to learn more about \"Neck: Exercises. \"  Current as of: March 21, 2017  Content Version: 11.4  © 0179-1351 [employer]. Care instructions adapted under license by [employer] (which disclaims liability or warranty for this information). If you have questions about a medical condition or this instruction, always ask your healthcare professional. [address] any warranty or liability for your use of this information.

## 2017-11-24 NOTE — MR AVS SNAPSHOT
Visit Information Date & Time Provider Department Dept. Phone Encounter #  
 11/24/2017  1:30 PM Edwin Maldonado  Kizzy Sheth Alin [phone] [id number]  
  
 4/20/2018  1:00 PM  
Any with Nicki King MD  
[facility] Appt Note: Return in about 1 year (around 4/21/2018) for Elevated PSA -- PSA 1 wk prior to f/u.  
 [address] [facility] [address]  
[phone]  
  
   
 [place] [id number] Upcoming Health Maintenance Date Due COLONOSCOPY 6/1/2017 DTaP/Tdap/Td series (2 - Td) 1/1/2025 Allergies as of 11/24/2017  Review Complete On: 9/20/2017 By: Kassandra Razo MD  
  
 Severity Noted Reaction Type Reactions Sulfa (Sulfonamide Antibiotics)  05/04/2012    Itching Current Immunizations  Never Reviewed Name Date Influenza Vaccine 9/12/2017, 10/10/2015 Not reviewed this visit You Were Diagnosed With   
  
 Codes Comments Subacute maxillary sinusitis    -  Primary ICD-10-CM: J01.00 ICD-9-CM: 461.0 Vitals BP Pulse Temp Resp Height(growth percentile) Weight(growth percentile) 131/75 (BP 1 Location: Right arm, BP Patient Position: Sitting) 75 97 °F (36.1 °C) (Oral) 20 6' 5\" (1.956 m) 258 lb (117 kg) SpO2 BMI Smoking Status 96% 30.59 kg/m2 Former Smoker Vitals History BMI and BSA Data Body Mass Index Body Surface Area 30.59 kg/m 2 2.52 m 2 Preferred Pharmacy Pharmacy Name Phone 100 [facility] [phone] Your Updated Medication List  
  
   
This list is accurate as of: 11/24/17  2:37 PM.  Always use your most recent med list.  
  
  
  
  
 aspirin 81 mg tablet Take 81 mg by mouth. azithromycin 250 mg tablet Commonly known as:  Lillian Ciro Take 2 tablets today, then take 1 tablet daily CENTRUM SILVER PO Take  by mouth. CIALIS 20 mg tablet Generic drug:  tadalafil Take 20 mg by mouth as needed. clonazePAM 0.5 mg tablet Commonly known as:  Arden Lopez Take 1 Tab by mouth nightly. Max Daily Amount: 0.5 mg.  
  
 CRESTOR 20 mg tablet Generic drug:  rosuvastatin TAKE 1 TABLET NIGHTLY  
  
 cyclobenzaprine 10 mg tablet Commonly known as:  FLEXERIL Take 1 Tab by mouth three (3) times daily as needed for Muscle Spasm(s). Indications: MUSCLE SPASM  
  
 fluticasone 50 mcg/actuation nasal spray Commonly known as:  FLONASE  
USE 2 SPRAYS IN EACH NOSTRIL DAILY HYDROcodone-acetaminophen 5-325 mg per tablet Commonly known as:  Jennifer Oyster Take 1 Tab by mouth daily as needed for Pain for up to 90 days. ibuprofen 800 mg tablet Commonly known as:  MOTRIN Take 1 Tab by mouth every eight (8) hours as needed for Pain.  
  
 ipratropium 0.06 % nasal spray Commonly known as:  ATROVENT  
2 Sprays by Both Nostrils route four (4) times daily as needed for Rhinitis. lisinopril 5 mg tablet Commonly known as:  Ora Bee Take 1 Tab by mouth daily. pantoprazole 40 mg tablet Commonly known as:  PROTONIX Take 1 Tab by mouth daily. tamsulosin 0.4 mg capsule Commonly known as:  FLOMAX Take 1 Cap by mouth daily. triamterene-hydroCHLOROthiazide 75-50 mg per tablet Commonly known as:  Soundra Shandra TAKE 1 TABLET DAILY Prescriptions Printed Refills  
 azithromycin (ZITHROMAX) 250 mg tablet 0 Sig: Take 2 tablets today, then take 1 tablet daily Class: Print Patient Instructions Continue with current regimen. If not better in the next 24-48 hours, get zpak and take until completed. Neck: Exercises Your Care Instructions Here are some examples of typical rehabilitation exercises for your condition. Start each exercise slowly. Ease off the exercise if you start to have pain.  
Your doctor or physical therapist will tell you when you can start these exercises and which ones will work best for you. How to do the exercises Neck stretch 1. This stretch works best if you keep your shoulder down as you lean away from it. To help you remember to do this, start by relaxing your shoulders and lightly holding on to your thighs or your chair. 2. Tilt your head toward your shoulder and hold for 15 to 30 seconds. Let the weight of your head stretch your muscles. 3. If you would like a little added stretch, use your hand to gently and steadily pull your head toward your shoulder. For example, keeping your right shoulder down, lean your head to the left. 4. Repeat 2 to 4 times toward each shoulder. Diagonal neck stretch 1. Turn your head slightly toward the direction you will be stretching, and tilt your head diagonally toward your chest and hold for 15 to 30 seconds. 2. If you would like a little added stretch, use your hand to gently and steadily pull your head forward on the diagonal. 
3. Repeat 2 to 4 times toward each side. Dorsal glide stretch The dorsal glide stretches the back of the neck. If you feel pain, do not glide so far back. Some people find this exercise easier to do while lying on their backs with an ice pack on the neck. 1. Sit or stand tall and look straight ahead. 2. Slowly tuck your chin as you glide your head backward over your body 3. Hold for a count of 6, and then relax for up to 10 seconds. 4. Repeat 8 to 12 times. Chest and shoulder stretch 1. Sit or stand tall and glide your head backward as in the dorsal glide stretch. 2. Raise both arms so that your hands are next to your ears. 3. Take a deep breath, and as you breathe out, lower your elbows down and behind your back. You will feel your shoulder blades slide down and together, and at the same time you will feel a stretch across your chest and the front of your shoulders. 4. Hold for about 6 seconds, and then relax for up to 10 seconds. 5. Repeat 8 to 12 times. Strengthening: Hands on head 1. Move your head backward, forward, and side to side against gentle pressure from your hands, holding each position for about 6 seconds. 2. Repeat 8 to 12 times. Follow-up care is a key part of your treatment and safety. Be sure to make and go to all appointments, and call your doctor if you are having problems. It's also a good idea to know your test results and keep a list of the medicines you take. Where can you learn more? Go to http://yomaira-ruth.info/. Enter P975 in the search box to learn more about \"Neck: Exercises. \" Current as of: March 21, 2017 Content Version: 11.4 © 5220-4129 [employer]. Care instructions adapted under license by [employer] (which disclaims liability or warranty for this information). If you have questions about a medical condition or this instruction, always ask your healthcare professional. [address] any warranty or liability for your use of this information. Introducing [facility] & HEALTH SERVICES! Dear Ann Causey: Thank you for requesting a [employer] account. Our records indicate that you already have an active [employer] account. You can access your account anytime at [email or web address] Did you know that you can access your hospital and ER discharge instructions at any time in [employer]? You can also review all of your test results from your hospital stay or ER visit. Additional Information If you have questions, please visit the Frequently Asked Questions section of the [employer] website at [email or web address] Remember, [employer] is NOT to be used for urgent needs. For medical emergencies, dial 911. Now available from your iPhone and Android! Please provide this summary of care documentation to your next provider. Your primary care clinician is listed as Tree Morocho.  If you have any questions after today's visit, please call [phone].

## 2017-12-14 ENCOUNTER — OFFICE VISIT (OUTPATIENT)
Dept: FAMILY MEDICINE CLINIC | Age: 62
End: 2017-12-14

## 2017-12-14 VITALS
HEART RATE: 80 BPM | SYSTOLIC BLOOD PRESSURE: 120 MMHG | DIASTOLIC BLOOD PRESSURE: 70 MMHG | WEIGHT: 288.6 LBS | TEMPERATURE: 98.1 F | RESPIRATION RATE: 19 BRPM | HEIGHT: 77 IN | OXYGEN SATURATION: 98 % | BODY MASS INDEX: 34.08 KG/M2

## 2017-12-14 DIAGNOSIS — E66.09 CLASS 1 OBESITY DUE TO EXCESS CALORIES WITH SERIOUS COMORBIDITY AND BODY MASS INDEX (BMI) OF 34.0 TO 34.9 IN ADULT: ICD-10-CM

## 2017-12-14 DIAGNOSIS — G89.29 CHRONIC PAIN OF LEFT KNEE: Primary | ICD-10-CM

## 2017-12-14 DIAGNOSIS — Z12.11 SCREEN FOR COLON CANCER: ICD-10-CM

## 2017-12-14 DIAGNOSIS — M25.562 CHRONIC PAIN OF LEFT KNEE: Primary | ICD-10-CM

## 2017-12-14 DIAGNOSIS — M25.522 LEFT ELBOW PAIN: ICD-10-CM

## 2017-12-14 NOTE — PROGRESS NOTES
Alexa Villeda is a 58 y.o. male (: 1955) presenting to address:    Chief Complaint   Patient presents with    Knee Pain     Left    Elbow Pain     Left       Vitals:    17 0806   BP: 120/70   Pulse: 80   Resp: 19   Temp: 98.1 °F (36.7 °C)   TempSrc: Oral   SpO2: 98%   Weight: 288 lb 9.6 oz (130.9 kg)   Height: 6' 5\" (1.956 m)   PainSc:   8   PainLoc: Generalized       Learning Assessment:     Learning Assessment 2015   PRIMARY LEARNER Patient   HIGHEST LEVEL OF EDUCATION - PRIMARY LEARNER  SOME COLLEGE   PRIMARY LANGUAGE ENGLISH   LEARNER PREFERENCE PRIMARY LISTENING   ANSWERED BY patient   RELATIONSHIP SELF     Depression Screening:     PHQ over the last two weeks 2017   Little interest or pleasure in doing things Not at all   Feeling down, depressed or hopeless Not at all   Total Score PHQ 2 0     Fall Risk Assessment:     Fall Risk Assessment, last 12 mths 2017   Able to walk? Yes   Fall in past 12 months? No     Abuse Screening:     Abuse Screening Questionnaire 2017   Do you ever feel afraid of your partner? N   Are you in a relationship with someone who physically or mentally threatens you? N   Is it safe for you to go home? Y     Coordination of Care Questionaire:   1. Have you been to the ER, urgent care clinic since your last visit? Hospitalized since your last visit? NO    2. Have you seen or consulted any other health care providers outside of the [address] [place] [address] since your last visit? Include any pap smears or colon screening. NO    Advanced Directive:   1. Do you have an Advanced Directive? YES    2. Would you like information on Advanced Directives?  NO

## 2017-12-14 NOTE — MR AVS SNAPSHOT
Visit Information Date & Time Provider Department Dept. Phone Encounter #  
 12/14/2017  8:00 AM Jorgito Wellington MD [facility] [phone] [id number]  
  
 4/20/2018  1:00 PM  
Any with Luisana Dixon MD  
[facility] Appt Note: Return in about 1 year (around 4/21/2018) for Elevated PSA -- PSA 1 wk prior to f/u.  
 [address] [facility] [address]  
[phone]  
  
   
 [place] [id number] Upcoming Health Maintenance Date Due COLONOSCOPY 6/1/2017 DTaP/Tdap/Td series (2 - Td) 1/1/2025 Allergies as of 12/14/2017  Review Complete On: 12/14/2017 By: Jorgito Wellington MD  
  
 Severity Noted Reaction Type Reactions Sulfa (Sulfonamide Antibiotics)  05/04/2012    Itching Current Immunizations  Never Reviewed Name Date Influenza Vaccine 9/12/2017, 10/10/2015 Not reviewed this visit You Were Diagnosed With   
  
 Codes Comments Chronic pain of left knee    -  Primary ICD-10-CM: M25.562, D97.86 ICD-9-CM: 719.46, 338.29 Left elbow pain     ICD-10-CM: U51.019 ICD-9-CM: 719.42 Class 1 obesity due to excess calories with serious comorbidity and body mass index (BMI) of 34.0 to 34.9 in adult     ICD-10-CM: E66.09, Z68.34 
ICD-9-CM: 278.00, V85.34 Screen for colon cancer     ICD-10-CM: Z12.11 ICD-9-CM: V76.51 Vitals BP Pulse Temp Resp Height(growth percentile) Weight(growth percentile) 120/70 (BP 1 Location: Left arm, BP Patient Position: Sitting) 80 98.1 °F (36.7 °C) (Oral) 19 6' 5\" (1.956 m) 288 lb 9.6 oz (130.9 kg) SpO2 BMI Smoking Status 98% 34.22 kg/m2 Former Smoker BMI and BSA Data Body Mass Index Body Surface Area  
 34.22 kg/m 2 2.67 m 2 Preferred Pharmacy Pharmacy Name Phone [address] [facility] [phone] Your Updated Medication List  
  
 This list is accurate as of: 12/14/17  8:22 AM.  Always use your most recent med list.  
  
  
  
  
 aspirin 81 mg tablet Take 81 mg by mouth. CENTRUM SILVER PO Take  by mouth. CIALIS 20 mg tablet Generic drug:  tadalafil Take 20 mg by mouth as needed. clonazePAM 0.5 mg tablet Commonly known as:  Nemesio Louis Take 1 Tab by mouth nightly. Max Daily Amount: 0.5 mg.  
  
 CRESTOR 20 mg tablet Generic drug:  rosuvastatin TAKE 1 TABLET NIGHTLY  
  
 cyclobenzaprine 10 mg tablet Commonly known as:  FLEXERIL Take 1 Tab by mouth three (3) times daily as needed for Muscle Spasm(s). Indications: MUSCLE SPASM  
  
 fluticasone 50 mcg/actuation nasal spray Commonly known as:  FLONASE  
USE 2 SPRAYS IN EACH NOSTRIL DAILY HYDROcodone-acetaminophen 5-325 mg per tablet Commonly known as:  March Castles Take 1 Tab by mouth daily as needed for Pain for up to 90 days. ipratropium 42 mcg (0.06 %) nasal spray Commonly known as:  ATROVENT  
2 Sprays by Both Nostrils route four (4) times daily as needed for Rhinitis. lisinopril 5 mg tablet Commonly known as:  Lucrecia Primmer Take 1 Tab by mouth daily. pantoprazole 40 mg tablet Commonly known as:  PROTONIX Take 1 Tab by mouth daily. tamsulosin 0.4 mg capsule Commonly known as:  FLOMAX Take 1 Cap by mouth daily. triamterene-hydroCHLOROthiazide 75-50 mg per tablet Commonly known as:  Meli Perch TAKE 1 TABLET DAILY We Performed the Following REFERRAL TO SPORTS MEDICINE [BBL854 Custom] To-Do List   
 12/14/2017 Imaging:  XR ELBOW LT MIN 3 V   
  
 12/14/2017 Imaging:  XR KNEE LT MIN 4 V   
  
 01/13/2018 Lab:  OCCULT BLOOD, IMMUNOASSAY (FIT) Referral Information Referral ID Referred By Referred To  
  
 [id number] [facility] [address] [place] [address] 101 216 [address] Phone: [phone] Fax: [phone] Visits Status Start Date End Date 1 New Request 12/14/17 12/14/18 If your referral has a status of pending review or denied, additional information will be sent to support the outcome of this decision. Patient Instructions Elbow: Exercises Your Care Instructions Here are some examples of exercises for elbows. Start each exercise slowly. Ease off the exercise if you start to have pain. Your doctor or physical therapist will tell you when you can start these exercises and which ones will work best for you. How to do the exercises Wrist flexor stretch 1. Extend your arm in front of you with your palm up. 2. Bend your wrist, pointing your hand toward the floor. 3. With your other hand, gently bend your wrist farther until you feel a mild to moderate stretch in your forearm. 4. Hold for at least 15 to 30 seconds. Repeat 2 to 4 times. Wrist extensor stretch 1. Repeat steps 1 to 4 of the stretch above but begin with your extended hand palm down. Ball or sock squeeze 1. Hold a tennis ball (or a rolled-up sock) in your hand. 2. Make a fist around the ball (or sock) and squeeze. 3. Hold for about 6 seconds, and then relax for up to 10 seconds. 4. Repeat 8 to 12 times. 5. Switch the ball (or sock) to your other hand and do 8 to 12 times. Wrist deviation 1. Sit so that your arm is supported but your hand hangs off the edge of a flat surface, such as a table. 2. Hold your hand out like you are shaking hands with someone. 3. Move your hand up and down. 4. Repeat this motion 8 to 12 times. 5. Switch arms. 6. Try to do this exercise twice with each hand. Wrist curls 1. Place your forearm on a table with your hand hanging over the edge of the table, palm up. 2. Place a 1- to 2-pound weight in your hand. This may be a dumbbell, a can of food, or a filled water bottle.  
3. Slowly raise and lower the weight while keeping your forearm on the table and your palm facing up. 4. Repeat this motion 8 to 12 times. 5. Switch arms, and do steps 1 through 4. 
6. Repeat with your hand facing down toward the floor. Switch arms. Biceps curls 1. Sit leaning forward with your legs slightly spread and your left hand on your left thigh. 2. Place your right elbow on your right thigh, and hold the weight with your forearm horizontal. 
3. Slowly curl the weight up and toward your chest. 
4. Repeat this motion 8 to 12 times. 5. Switch arms, and do steps 1 through 4. Follow-up care is a key part of your treatment and safety. Be sure to make and go to all appointments, and call your doctor if you are having problems. It's also a good idea to know your test results and keep a list of the medicines you take. Where can you learn more? Go to http://yomaira-ruth.info/. Enter M279 in the search box to learn more about \"Elbow: Exercises. \" Current as of: March 21, 2017 Content Version: 11.4 © 7554-5477 [employer]. Care instructions adapted under license by [employer] (which disclaims liability or warranty for this information). If you have questions about a medical condition or this instruction, always ask your healthcare professional. [address] any warranty or liability for your use of this information. Introducing [facility] & HEALTH SERVICES! Dear Destiny Powers: Thank you for requesting a MedArkive account. Our records indicate that you already have an active MedArkive account. You can access your account anytime at [email or web address] Did you know that you can access your hospital and ER discharge instructions at any time in MedArkive? You can also review all of your test results from your hospital stay or ER visit. Additional Information If you have questions, please visit the Frequently Asked Questions section of the MedArk[employer] website at [email or web address] Remember, MyChart is NOT to be used for urgent needs. For medical emergencies, dial 911. Now available from your iPhone and Android! Please provide this summary of care documentation to your next provider. Your primary care clinician is listed as Tree Morocho. If you have any questions after today's visit, please call [phone].

## 2017-12-14 NOTE — PROGRESS NOTES
Assessment/Plan:    1. Chronic pain of left knee  -xray knee. Referral back to [facility]  - XR KNEE LT MIN 4 V; Future  - REFERRAL TO SPORTS MEDICINE    2. Left elbow pain  - XR ELBOW LT MIN 3 V; Future    3. Class 1 obesity due to excess calories with serious comorbidity and body mass index (BMI) of 34.0 to 34.9 in adult    4. Screen for colon cancer  - OCCULT BLOOD, IMMUNOASSAY (FIT); Future    The plan was discussed with the patient. The patient verbalized understanding and is in agreement with the plan. All medication potential side effects were discussed with the patient. Health Maintenance:   Health Maintenance   Topic Date Due    COLONOSCOPY  06/01/2017    DTaP/Tdap/Td series (2 - Td) 01/01/2025    Hepatitis C Screening  Completed    ZOSTER VACCINE AGE 60>  Completed    Influenza Age 5 to Adult  Completed       Segun Daniel is a 58 y.o. male and presents with Knee Pain (Left) and Elbow Pain (Left)     Subjective:  Pt c/o L knee pain, chronic intermittent. Worse with walking down stairs. Significantly worse over past week. He now has to use handrail walking down stairs due to pain. No lateralized to certain area. No inciting event. The pain is sharp. Had meniscus tear in R knee, but this feels different. Pt also c/o L elbow pain. Has burning sensation over lateral aspect of elbow with flexion and extension. Has pain with abduction and extension of L arm. Started 4/2017 when he was working out with weights more. ROS:  Constitutional: No recent weight change. No weakness/fatigue. No f/c. Cardiovascular: No CP/palpitations. No RASCON/orthopnea/PND. Respiratory: No cough/sputum, dyspnea, wheezing. Musculoskeletal: + joint pain/stiffness. No muscle pain/tenderness. The problem list was updated as a part of today's visit.   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 SH:  Social History   Substance Use Topics    Smoking status: Former Smoker     Quit date: 4/27/1989    Smokeless tobacco: Never Used    Alcohol use 20.4 oz/week     14 Glasses of wine, 14 Cans of beer, 6 Standard drinks or equivalent per week       Medications/Allergies:  Current Outpatient Prescriptions on File Prior to Visit   Medication Sig Dispense Refill    tamsulosin (FLOMAX) 0.4 mg capsule Take 1 Cap by mouth daily. 90 Cap 0    lisinopril (PRINIVIL, ZESTRIL) 5 mg tablet Take 1 Tab by mouth daily. 90 Tab 1    clonazePAM (KLONOPIN) 0.5 mg tablet Take 1 Tab by mouth nightly. Max Daily Amount: 0.5 mg. 90 Tab 1    HYDROcodone-acetaminophen (NORCO) 5-325 mg per tablet Take 1 Tab by mouth daily as needed for Pain for up to 90 days. 30 Tab 0    cyclobenzaprine (FLEXERIL) 10 mg tablet Take 1 Tab by mouth three (3) times daily as needed for Muscle Spasm(s). Indications: MUSCLE SPASM 90 Tab 0    pantoprazole (PROTONIX) 40 mg tablet Take 1 Tab by mouth daily. 90 Tab 3    fluticasone (FLONASE) 50 mcg/actuation nasal spray USE 2 SPRAYS IN EACH NOSTRIL DAILY 48 g 2    CRESTOR 20 mg tablet TAKE 1 TABLET NIGHTLY 90 Tab 2    triamterene-hydroCHLOROthiazide (MAXZIDE) 75-50 mg per tablet TAKE 1 TABLET DAILY 90 Tab 1    ipratropium (ATROVENT) 0.06 % nasal spray 2 Sprays by Both Nostrils route four (4) times daily as needed for Rhinitis. 45 mL 1    tadalafil (CIALIS) 20 mg tablet Take 20 mg by mouth as needed.  MULTIVITAMIN W-MINERALS/LUTEIN (CENTRUM SILVER PO) Take  by mouth.  aspirin 81 mg tablet Take 81 mg by mouth.         azithromycin (ZITHROMAX) 250 mg tablet Take 2 tablets today, then take 1 tablet daily 6 Tab 0    ibuprofen (MOTRIN) 800 mg tablet Take 1 Tab by mouth every eight (8) hours as needed for Pain. 90 Tab 0     No current facility-administered medications on file prior to visit. Allergies   Allergen Reactions    Sulfa (Sulfonamide Antibiotics) Itching       Objective:  Visit Vitals    /70 (BP 1 Location: Left arm, BP Patient Position: Sitting)    Pulse 80    Temp 98.1 °F (36.7 °C) (Oral)    Resp 19    Ht 6' 5\" (1.956 m)    Wt 288 lb 9.6 oz (130.9 kg)    SpO2 98%    BMI 34.22 kg/m2      Constitutional: Well developed, nourished, no distress, alert, obese habitus   CV: S1, S2.  RRR. No murmurs/rubs. No thrills palpated. No carotid bruits. Intact distal pulses. No edema. Pulm: No abnormalities on inspection. Clear to auscultation bilaterally. No wheezing/rhonchi. Normal effort. MS: Gait normal.  No pain over medial or lateral L knee. No crepitus. Neg thessaly. L elbow -no pain with pROM or with supination. No pain over lateral or medial epicondyle. Neuro: A/O x 3. No focal motor or sensory deficits.  Speech normal.

## 2017-12-14 NOTE — PATIENT INSTRUCTIONS
Elbow: Exercises  Your Care Instructions  Here are some examples of exercises for elbows. Start each exercise slowly. Ease off the exercise if you start to have pain. Your doctor or physical therapist will tell you when you can start these exercises and which ones will work best for you. How to do the exercises  Wrist flexor stretch    1. Extend your arm in front of you with your palm up. 2. Bend your wrist, pointing your hand toward the floor. 3. With your other hand, gently bend your wrist farther until you feel a mild to moderate stretch in your forearm. 4. Hold for at least 15 to 30 seconds. Repeat 2 to 4 times. Wrist extensor stretch    1. Repeat steps 1 to 4 of the stretch above but begin with your extended hand palm down. Ball or sock squeeze    1. Hold a tennis ball (or a rolled-up sock) in your hand. 2. Make a fist around the ball (or sock) and squeeze. 3. Hold for about 6 seconds, and then relax for up to 10 seconds. 4. Repeat 8 to 12 times. 5. Switch the ball (or sock) to your other hand and do 8 to 12 times. Wrist deviation    1. Sit so that your arm is supported but your hand hangs off the edge of a flat surface, such as a table. 2. Hold your hand out like you are shaking hands with someone. 3. Move your hand up and down. 4. Repeat this motion 8 to 12 times. 5. Switch arms. 6. Try to do this exercise twice with each hand. Wrist curls    1. Place your forearm on a table with your hand hanging over the edge of the table, palm up. 2. Place a 1- to 2-pound weight in your hand. This may be a dumbbell, a can of food, or a filled water bottle. 3. Slowly raise and lower the weight while keeping your forearm on the table and your palm facing up. 4. Repeat this motion 8 to 12 times. 5. Switch arms, and do steps 1 through 4.  6. Repeat with your hand facing down toward the floor. Switch arms. Biceps curls    1.  Sit leaning forward with your legs slightly spread and your left hand on your left thigh. 2. Place your right elbow on your right thigh, and hold the weight with your forearm horizontal.  3. Slowly curl the weight up and toward your chest.  4. Repeat this motion 8 to 12 times. 5. Switch arms, and do steps 1 through 4. Follow-up care is a key part of your treatment and safety. Be sure to make and go to all appointments, and call your doctor if you are having problems. It's also a good idea to know your test results and keep a list of the medicines you take. Where can you learn more? Go to http://yomaira-ruth.info/. Enter M279 in the search box to learn more about \"Elbow: Exercises. \"  Current as of: March 21, 2017  Content Version: 11.4  © 9989-5257 [employer]. Care instructions adapted under license by [employer] (which disclaims liability or warranty for this information). If you have questions about a medical condition or this instruction, always ask your healthcare professional. [address] any warranty or liability for your use of this information.

## 2017-12-27 DIAGNOSIS — I10 BENIGN ESSENTIAL HTN: ICD-10-CM

## 2017-12-29 RX ORDER — TRIAMTERENE AND HYDROCHLOROTHIAZIDE 75; 50 MG/1; MG/1
TABLET ORAL
Qty: 90 TAB | Refills: 0 | Status: SHIPPED | OUTPATIENT
Start: 2017-12-29 | End: 2018-02-07 | Stop reason: SDUPTHER

## 2018-01-05 RX ORDER — FLUTICASONE PROPIONATE 50 MCG
SPRAY, SUSPENSION (ML) NASAL
Qty: 48 G | Refills: 2 | Status: SHIPPED | COMMUNITY
Start: 2018-01-05 | End: 2018-10-25 | Stop reason: SDUPTHER

## 2018-01-08 DIAGNOSIS — G47.50 PARASOMNIA: ICD-10-CM

## 2018-01-08 RX ORDER — CLONAZEPAM 0.5 MG/1
0.5 TABLET ORAL
Qty: 90 TAB | Refills: 1 | Status: SHIPPED | OUTPATIENT
Start: 2018-01-08 | End: 2018-10-08 | Stop reason: SDUPTHER

## 2018-01-13 DIAGNOSIS — Z12.11 SCREEN FOR COLON CANCER: ICD-10-CM

## 2018-01-16 LAB — HEMOCCULT STL QL: NEGATIVE

## 2018-01-30 DIAGNOSIS — I10 BENIGN ESSENTIAL HTN: ICD-10-CM

## 2018-01-30 RX ORDER — LISINOPRIL 5 MG/1
TABLET ORAL
Qty: 90 TAB | Refills: 1 | Status: SHIPPED | OUTPATIENT
Start: 2018-01-30 | End: 2018-07-29 | Stop reason: SDUPTHER

## 2018-02-06 ENCOUNTER — TELEPHONE (OUTPATIENT)
Dept: FAMILY MEDICINE CLINIC | Age: 63
End: 2018-02-06

## 2018-02-06 DIAGNOSIS — E78.2 MIXED HYPERCHOLESTEROLEMIA AND HYPERTRIGLYCERIDEMIA: ICD-10-CM

## 2018-02-06 RX ORDER — ROSUVASTATIN CALCIUM 20 MG/1
TABLET, COATED ORAL
Qty: 90 TAB | Refills: 1 | Status: SHIPPED | OUTPATIENT
Start: 2018-02-06 | End: 2018-09-02 | Stop reason: SDUPTHER

## 2018-02-06 NOTE — TELEPHONE ENCOUNTER
Patient pharmacy is requesting a med refill of rosuvastatin 20 mg    Last visit: 12/14/17    Last refill: 12/29/17

## 2018-02-06 NOTE — TELEPHONE ENCOUNTER
Please call [facility] labs. Pt mailed in stool test 1/3. States he spoke with someone at the lab and they said they had it. However, they only faxed the blood tests we already had resulted. Do they have stool?

## 2018-02-07 DIAGNOSIS — I10 BENIGN ESSENTIAL HTN: ICD-10-CM

## 2018-02-07 RX ORDER — TRIAMTERENE AND HYDROCHLOROTHIAZIDE 75; 50 MG/1; MG/1
TABLET ORAL
Qty: 90 TAB | Refills: 1 | Status: SHIPPED | OUTPATIENT
Start: 2018-02-07 | End: 2018-09-02 | Stop reason: SDUPTHER

## 2018-02-07 NOTE — TELEPHONE ENCOUNTER
Patient pharmacy is requesting a med refill of triam/hctz 75/50 mg    Last visit: 12/14/18    Last refill: 12/29/18

## 2018-02-12 NOTE — TELEPHONE ENCOUNTER
No results at [facility], sample sent to [facility]. They are sending results.

## 2018-04-26 RX ORDER — PANTOPRAZOLE SODIUM 40 MG/1
TABLET, DELAYED RELEASE ORAL
Qty: 90 TAB | Refills: 3 | Status: SHIPPED | COMMUNITY
Start: 2018-04-26 | End: 2019-07-07 | Stop reason: SDUPTHER

## 2018-07-29 DIAGNOSIS — I10 BENIGN ESSENTIAL HTN: ICD-10-CM

## 2018-07-30 ENCOUNTER — OFFICE VISIT (OUTPATIENT)
Dept: FAMILY MEDICINE CLINIC | Age: 63
End: 2018-07-30

## 2018-07-30 VITALS
RESPIRATION RATE: 18 BRPM | HEIGHT: 77 IN | WEIGHT: 246 LBS | OXYGEN SATURATION: 96 % | TEMPERATURE: 98.2 F | DIASTOLIC BLOOD PRESSURE: 58 MMHG | BODY MASS INDEX: 29.05 KG/M2 | HEART RATE: 68 BPM | SYSTOLIC BLOOD PRESSURE: 100 MMHG

## 2018-07-30 DIAGNOSIS — R97.20 ELEVATED PROSTATE SPECIFIC ANTIGEN (PSA): ICD-10-CM

## 2018-07-30 DIAGNOSIS — M54.16 LUMBAR RADICULAR PAIN: Primary | ICD-10-CM

## 2018-07-30 RX ORDER — LIDOCAINE 50 MG/G
PATCH TOPICAL
COMMUNITY
Start: 2018-07-28 | End: 2018-10-25 | Stop reason: ALTCHOICE

## 2018-07-30 RX ORDER — LISINOPRIL 5 MG/1
TABLET ORAL
Qty: 90 TAB | Refills: 1 | Status: SHIPPED | OUTPATIENT
Start: 2018-07-30 | End: 2019-01-25 | Stop reason: SDUPTHER

## 2018-07-30 RX ORDER — CYCLOBENZAPRINE HCL 10 MG
10 TABLET ORAL
Qty: 90 TAB | Refills: 0 | Status: SHIPPED | OUTPATIENT
Start: 2018-07-30 | End: 2020-01-09 | Stop reason: ALTCHOICE

## 2018-07-30 RX ORDER — HYDROCODONE BITARTRATE AND ACETAMINOPHEN 5; 325 MG/1; MG/1
TABLET ORAL
COMMUNITY
Start: 2018-07-28 | End: 2018-08-02

## 2018-07-30 RX ORDER — DIAZEPAM 5 MG/1
5 TABLET ORAL
COMMUNITY
Start: 2018-07-28 | End: 2018-10-25 | Stop reason: ALTCHOICE

## 2018-07-30 RX ORDER — METHYLPREDNISOLONE 4 MG/1
TABLET ORAL
COMMUNITY
Start: 2018-07-28 | End: 2018-10-25 | Stop reason: ALTCHOICE

## 2018-07-30 NOTE — PATIENT INSTRUCTIONS
Learning About How to Have a Healthy Back What causes back pain? Back pain is often caused by overuse, strain, or injury. For example, people often hurt their backs playing sports or working in the yard, being jolted in a car accident, or lifting something too heavy. Aging plays a part too. Your bones and muscles tend to lose strength as you age, which makes injury more likely. The spongy discs between the bones of the spine (vertebrae) may suffer from wear and tear and no longer provide enough cushion between the bones. A disc that bulges or breaks open (herniated disc) can press on nerves, causing back pain. In some people, back pain is the result of arthritis, broken vertebrae caused by bone loss (osteoporosis), illness, or a spine problem. Although most people have back pain at one time or another, there are steps you can take to make it less likely. How can you have a healthy back? Reduce stress on your back through good posture Slumping or slouching alone may not cause low back pain. But after the back has been strained or injured, bad posture can make pain worse. · Sleep in a position that maintains your back's normal curves and on a mattress that feels comfortable. Sleep on your side with a pillow between your knees, or sleep on your back with a pillow under your knees. These positions can reduce strain on your back. · Stand and sit up straight. \"Good posture\" generally means your ears, shoulders, and hips are in a straight line. · If you must stand for a long time, put one foot on a stool, ledge, or box. Switch feet every now and then. · Sit in a chair that is low enough to let you place both feet flat on the floor with both knees nearly level with your hips. If your chair or desk is too high, use a footrest to raise your knees. Place a small pillow, a rolled-up towel, or a lumbar roll in the curve of your back if you need extra support.  
· Try a kneeling chair, which helps tilt your hips forward. This takes pressure off your lower back. · Try sitting on an exercise ball. It can rock from side to side, which helps keep your back loose. · When driving, keep your knees nearly level with your hips. Sit straight, and drive with both hands on the steering wheel. Your arms should be in a slightly bent position. Reduce stress on your back through careful lifting · Squat down, bending at the hips and knees only. If you need to, put one knee to the floor and extend your other knee in front of you, bent at a right angle (half kneeling). · Press your chest straight forward. This helps keep your upper back straight while keeping a slight arch in your low back. · Hold the load as close to your body as possible, at the level of your belly button (navel). · Use your feet to change direction, taking small steps. · Lead with your hips as you change direction. Keep your shoulders in line with your hips as you move. · Set down your load carefully, squatting with your knees and hips only. Exercise and stretch your back · Do some exercise on most days of the week, if your doctor says it is okay. You can walk, run, swim, or cycle. · Stretch your back muscles. Here are a few exercises to try: ¨ Lie on your back, and gently pull one bent knee to your chest. Put that foot back on the floor, and then pull the other knee to your chest. 
¨ Do pelvic tilts. Lie on your back with your knees bent. Tighten your stomach muscles. Pull your belly button (navel) in and up toward your ribs. You should feel like your back is pressing to the floor and your hips and pelvis are slightly lifting off the floor. Hold for 6 seconds while breathing smoothly. ¨ Sit with your back flat against a wall. · Keep your core muscles strong. The muscles of your back, belly (abdomen), and buttocks support your spine. ¨ Pull in your belly and imagine pulling your navel toward your spine. Hold this for 6 seconds, then relax.  Remember to keep breathing normally as you tense your muscles. ¨ Do curl-ups. Always do them with your knees bent. Keep your low back on the floor, and curl your shoulders toward your knees using a smooth, slow motion. Keep your arms folded across your chest. If this bothers your neck, try putting your hands behind your neck (not your head), with your elbows spread apart. ¨ Lie on your back with your knees bent and your feet flat on the floor. Tighten your belly muscles, and then push with your feet and raise your buttocks up a few inches. Hold this position 6 seconds as you continue to breathe normally, then lower yourself slowly to the floor. Repeat 8 to 12 times. ¨ If you like group exercise, try Pilates or yoga. These classes have poses that strengthen the core muscles. Lead a healthy lifestyle · Stay at a healthy weight to avoid strain on your back. · Do not smoke. Smoking increases the risk of osteoporosis, which weakens the spine. If you need help quitting, talk to your doctor about stop-smoking programs and medicines. These can increase your chances of quitting for good. Where can you learn more? Go to http://yomaira-ruth.info/. Enter L315 in the search box to learn more about \"Learning About How to Have a Healthy Back. \" Current as of: November 29, 2017 Content Version: 11.7 © 9140-6768 [employer]. Care instructions adapted under license by [employer] (which disclaims liability or warranty for this information). If you have questions about a medical condition or this instruction, always ask your healthcare professional. [place] [address] any warranty or liability for your use of this information.

## 2018-07-30 NOTE — PROGRESS NOTES
Assessment/Plan: 1. Lumbar radicular pain 
-flexeril and naprosyn. Stop valium from ER. Finish steroids. Ck MRI given h/o elevated PSA, midline pain and severity. 
- HYDROcodone-acetaminophen (NORCO) 5-325 mg per tablet; Take 1 Tab by Mouth Every 4 Hours As Needed for Pain for up to 5 days. - lidocaine (LIDODERM) 5 %; Apply 1 patch as directed for 12 hours every 24 hours (12 hours on, 12 hours off) 
- methylPREDNISolone (MEDROL DOSEPACK) 4 mg tablet; Take 1 Tab by Mouth See Admin Instrs. Take orally  As directed on packaging. 
- MRI LUMB SPINE WO CONT; Future 
- cyclobenzaprine (FLEXERIL) 10 mg tablet; Take 1 Tab by mouth three (3) times daily as needed for Muscle Spasm(s). Indications: Muscle Spasm  Dispense: 90 Tab; Refill: 0 The plan was discussed with the patient. The patient verbalized understanding and is in agreement with the plan. All medication potential side effects were discussed with the patient. Health Maintenance:  
Health Maintenance Topic Date Due  Influenza Age 5 to Adult  08/01/2018  FOBT Q 1 YEAR, 18+  01/16/2019  
 DTaP/Tdap/Td series (2 - Td) 01/01/2025  Hepatitis C Screening  Completed  ZOSTER VACCINE AGE 60>  Completed Mesha Camacho is a 58 y.o. male and presents with ED Follow-up Subjective: He states 3 days ago, he bent down and when he went to stand up he had a sharp pain in low back. Pain radiates down lateral thighs bilat. Pain is midline low back with radiation bilat. He laid on the floor b/c his pain was so severe. He eventually got up and put on his back brace. The next day, he had severe pain and needed assistance getting out of bed. Was seen in ER 7/28 and lumbar xray was unremarkable. Was given valium, medrol dosepak and norco in ER. He has elevated PSA (>9), but biopsy neg in past. States his pain is so severe, he hasn't had this level of pain previously. Is constipated since injuring his back. No incontinence.  No paresthesia in legs.  
 
ROS: 
Constitutional: No recent weight change. No weakness/fatigue. No f/c. Cardiovascular: No CP/palpitations. No RASCON/orthopnea/PND. Respiratory: No cough/sputum, dyspnea, wheezing. Gastointestinal: No dysphagia, reflux. No n/v.  + constipation/no diarrhea. No melena/rectal bleeding. Genitourinary: No dysuria, urinary hesitancy, nocturia, hematuria. No incontinence. Musculoskeletal: + joint pain/+stiffness. No muscle pain/tenderness. Neurological: No seizures/numbness/weakness. no paresthesias. The problem list was updated as a part of today's visit. Patient Active Problem List  
Diagnosis Code  Elevated prostate specific antigen (PSA) R97.20  Medial epicondylitis of elbow M77.00  BPH (benign prostatic hypertrophy) N40.0  Benign essential HTN I10  
 Mixed hypercholesterolemia and hypertriglyceridemia E78.2  Chronic low back pain M54.5, G89.29  
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 SH: Social History Substance Use Topics  Smoking status: Former Smoker Quit date: 4/27/1989  Smokeless tobacco: Never Used  Alcohol use 20.4 oz/week 14 Glasses of wine, 14 Cans of beer, 6 Standard drinks or equivalent per week Medications/Allergies: 
Current Outpatient Prescriptions on File Prior to Visit Medication Sig Dispense Refill  lisinopril (PRINIVIL, ZESTRIL) 5 mg tablet TAKE 1 TABLET DAILY 90 Tab 1  
 pantoprazole (PROTONIX) 40 mg tablet TAKE 1 TABLET DAILY 90 Tab 3  
 triamterene-hydroCHLOROthiazide (MAXZIDE) 75-50 mg per tablet TAKE 1 TABLET DAILY 90 Tab 1  
 rosuvastatin (CRESTOR) 20 mg tablet TAKE 1 TABLET NIGHTLY 90 Tab 1  
 tamsulosin (FLOMAX) 0.4 mg capsule Take 1 Cap by mouth daily. 90 Cap 3  clonazePAM (KLONOPIN) 0.5 mg tablet Take 1 Tab by mouth nightly. Max Daily Amount: 0.5 mg. 90 Tab 1  fluticasone (FLONASE) 50 mcg/actuation nasal spray USE 2 SPRAYS IN EACH NOSTRIL DAILY 48 g 2  cyclobenzaprine (FLEXERIL) 10 mg tablet Take 1 Tab by mouth three (3) times daily as needed for Muscle Spasm(s). Indications: MUSCLE SPASM 90 Tab 0  
 ipratropium (ATROVENT) 0.06 % nasal spray 2 Sprays by Both Nostrils route four (4) times daily as needed for Rhinitis. 45 mL 1  MULTIVITAMIN W-MINERALS/LUTEIN (CENTRUM SILVER PO) Take  by mouth.  aspirin 81 mg tablet Take 81 mg by mouth.  tadalafil (CIALIS) 20 mg tablet Take 20 mg by mouth as needed. No current facility-administered medications on file prior to visit. Allergies Allergen Reactions  Sulfa (Sulfonamide Antibiotics) Itching Objective: 
Visit Vitals  /58 (BP 1 Location: Left arm, BP Patient Position: Sitting)  Pulse 68  Temp 98.2 °F (36.8 °C) (Oral)  Resp 18  Ht 6' 5\" (1.956 m)  Wt 246 lb (111.6 kg)  SpO2 96%  BMI 29.17 kg/m2 Constitutional: Well developed, nourished, no distress, alert CV: S1, S2.  RRR. No murmurs/rubs. No thrills palpated. No carotid bruits. Intact distal pulses. No edema. No aortic bruits. Pulm: No abnormalities on inspection. Clear to auscultation bilaterally. No wheezing/rhonchi. Normal effort. MS: Gait normal.  Joints without deformity/tenderness. Strength intact bilateral upper and lower ext. Normal ROM all extremities. Neuro: A/O x 3. No focal motor or sensory deficits. Speech normal.  
Psych: Appropriate affect, judgement and insight. Short-term memory intact.

## 2018-07-30 NOTE — MR AVS SNAPSHOT
[address] [place] [address] [address] 2201 [facility] [id number] [phone] Patient: Vilmaeta Patch MRN: [id number] SWE:[id number] Visit Information Date & Time Provider Department Dept. Phone Encounter #  
 7/30/2018 10:30 AM Rosemary Delgado MD [facility] [phone] [id number]  
  
 4/26/2019  1:15 PM  
Any with Lynn Woods MD  
[facility]) Appt Note: Return in about 1 year (around 4/27/2019) for Elevated PSA -- PSA 1 wk prior to f/u.  
 [address] [address]  
[phone]  
  
   
 [facility] [phone] Upcoming Health Maintenance Date Due Influenza Age 5 to Adult 8/1/2018 FOBT Q 1 YEAR, 18+ 1/16/2019 DTaP/Tdap/Td series (2 - Td) 1/1/2025 Allergies as of 7/30/2018  Review Complete On: 7/30/2018 By: Rosemary Delgado MD  
  
 Severity Noted Reaction Type Reactions Sulfa (Sulfonamide Antibiotics)  05/04/2012    Itching Current Immunizations  Never Reviewed Name Date Influenza Vaccine 9/12/2017, 10/10/2015 Not reviewed this visit You Were Diagnosed With   
  
 Codes Comments Lumbar radicular pain    -  Primary ICD-10-CM: M54.16 
ICD-9-CM: 724.4 Elevated prostate specific antigen (PSA)     ICD-10-CM: R97.20 ICD-9-CM: 790.93 Neck pain     ICD-10-CM: M54.2 ICD-9-CM: 723.1 Vitals BP Pulse Temp Resp Height(growth percentile) Weight(growth percentile) 100/58 (BP 1 Location: Left arm, BP Patient Position: Sitting) 68 98.2 °F (36.8 °C) (Oral) 18 6' 5\" (1.956 m) 246 lb (111.6 kg) SpO2 BMI Smoking Status 96% 29.17 kg/m2 Former Smoker Vitals History BMI and BSA Data Body Mass Index Body Surface Area  
 29.17 kg/m 2 2.46 m 2 Preferred Pharmacy Pharmacy Name Phone [facility] [phone] Your Updated Medication List  
  
   
This list is accurate as of 7/30/18 10:53 AM.  Always use your most recent med list.  
  
  
  
  
 aspirin 81 mg tablet Take 81 mg by mouth. CENTRUM SILVER PO Take  by mouth. CIALIS 20 mg tablet Generic drug:  tadalafil Take 20 mg by mouth as needed. clonazePAM 0.5 mg tablet Commonly known as:  Nemesio Myers Take 1 Tab by mouth nightly. Max Daily Amount: 0.5 mg.  
  
 cyclobenzaprine 10 mg tablet Commonly known as:  FLEXERIL Take 1 Tab by mouth three (3) times daily as needed for Muscle Spasm(s). Indications: Muscle Spasm  
  
 fluticasone 50 mcg/actuation nasal spray Commonly known as:  FLONASE  
USE 2 SPRAYS IN EACH NOSTRIL DAILY HYDROcodone-acetaminophen 5-325 mg per tablet Commonly known as:  [address] Rodríguez Take 1 Tab by Mouth Every 4 Hours As Needed for Pain for up to 5 days. ipratropium 42 mcg (0.06 %) nasal spray Commonly known as:  ATROVENT  
2 Sprays by Both Nostrils route four (4) times daily as needed for Rhinitis.  
  
 lidocaine 5 % Commonly known as:  Minor Alt Apply 1 patch as directed for 12 hours every 24 hours (12 hours on, 12 hours off)  
  
 lisinopril 5 mg tablet Commonly known as:  PRINIVIL, ZESTRIL  
TAKE 1 TABLET DAILY  
  
 methylPREDNISolone 4 mg tablet Commonly known as:  Jayna Gentle Take 1 Tab by Mouth See Admin Instrs. Take orally  As directed on packaging. pantoprazole 40 mg tablet Commonly known as:  PROTONIX  
TAKE 1 TABLET DAILY  
  
 rosuvastatin 20 mg tablet Commonly known as:  CRESTOR  
TAKE 1 TABLET NIGHTLY  
  
 tamsulosin 0.4 mg capsule Commonly known as:  FLOMAX Take 1 Cap by mouth daily. triamterene-hydroCHLOROthiazide 75-50 mg per tablet Commonly known as:  Meli Perch TAKE 1 TABLET DAILY  
  
 VALIUM 5 mg tablet Generic drug:  diazePAM  
5 mg. Prescriptions Sent to Pharmacy  Refills  
 cyclobenzaprine (FLEXERIL) 10 mg tablet 0  
 Sig: Take 1 Tab by mouth three (3) times daily as needed for Muscle Spasm(s). Indications: Muscle Spasm Class: Normal  
 Pharmacy: [address] , [address] [place] [address] #: [phone] Route: Oral  
  
To-Do List   
 07/30/2018 Imaging:  MRI LUMB SPINE WO CONT Referral Information Referral ID Referred By Referred To  
  
 [id number] Irma Chavis V Not Available Visits Status Start Date End Date 1 New Request 7/30/18 7/30/19 If your referral has a status of pending review or denied, additional information will be sent to support the outcome of this decision. Patient Instructions Learning About How to Have a Healthy Back What causes back pain? Back pain is often caused by overuse, strain, or injury. For example, people often hurt their backs playing sports or working in the yard, being jolted in a car accident, or lifting something too heavy. Aging plays a part too. Your bones and muscles tend to lose strength as you age, which makes injury more likely. The spongy discs between the bones of the spine (vertebrae) may suffer from wear and tear and no longer provide enough cushion between the bones. A disc that bulges or breaks open (herniated disc) can press on nerves, causing back pain. In some people, back pain is the result of arthritis, broken vertebrae caused by bone loss (osteoporosis), illness, or a spine problem. Although most people have back pain at one time or another, there are steps you can take to make it less likely. How can you have a healthy back? Reduce stress on your back through good posture Slumping or slouching alone may not cause low back pain. But after the back has been strained or injured, bad posture can make pain worse. · Sleep in a position that maintains your back's normal curves and on a mattress that feels comfortable.  Sleep on your side with a pillow between your knees, or sleep on your back with a pillow under your knees. These positions can reduce strain on your back. · Stand and sit up straight. \"Good posture\" generally means your ears, shoulders, and hips are in a straight line. · If you must stand for a long time, put one foot on a stool, ledge, or box. Switch feet every now and then. · Sit in a chair that is low enough to let you place both feet flat on the floor with both knees nearly level with your hips. If your chair or desk is too high, use a footrest to raise your knees. Place a small pillow, a rolled-up towel, or a lumbar roll in the curve of your back if you need extra support. · Try a kneeling chair, which helps tilt your hips forward. This takes pressure off your lower back. · Try sitting on an exercise ball. It can rock from side to side, which helps keep your back loose. · When driving, keep your knees nearly level with your hips. Sit straight, and drive with both hands on the steering wheel. Your arms should be in a slightly bent position. Reduce stress on your back through careful lifting · Squat down, bending at the hips and knees only. If you need to, put one knee to the floor and extend your other knee in front of you, bent at a right angle (half kneeling). · Press your chest straight forward. This helps keep your upper back straight while keeping a slight arch in your low back. · Hold the load as close to your body as possible, at the level of your belly button (navel). · Use your feet to change direction, taking small steps. · Lead with your hips as you change direction. Keep your shoulders in line with your hips as you move. · Set down your load carefully, squatting with your knees and hips only. Exercise and stretch your back · Do some exercise on most days of the week, if your doctor says it is okay. You can walk, run, swim, or cycle. · Stretch your back muscles. Here are a few exercises to try: ¨ Lie on your back, and gently pull one bent knee to your chest. Put that foot back on the floor, and then pull the other knee to your chest. 
¨ Do pelvic tilts. Lie on your back with your knees bent. Tighten your stomach muscles. Pull your belly button (navel) in and up toward your ribs. You should feel like your back is pressing to the floor and your hips and pelvis are slightly lifting off the floor. Hold for 6 seconds while breathing smoothly. ¨ Sit with your back flat against a wall. · Keep your core muscles strong. The muscles of your back, belly (abdomen), and buttocks support your spine. ¨ Pull in your belly and imagine pulling your navel toward your spine. Hold this for 6 seconds, then relax. Remember to keep breathing normally as you tense your muscles. ¨ Do curl-ups. Always do them with your knees bent. Keep your low back on the floor, and curl your shoulders toward your knees using a smooth, slow motion. Keep your arms folded across your chest. If this bothers your neck, try putting your hands behind your neck (not your head), with your elbows spread apart. ¨ Lie on your back with your knees bent and your feet flat on the floor. Tighten your belly muscles, and then push with your feet and raise your buttocks up a few inches. Hold this position 6 seconds as you continue to breathe normally, then lower yourself slowly to the floor. Repeat 8 to 12 times. ¨ If you like group exercise, try Pilates or yoga. These classes have poses that strengthen the core muscles. Lead a healthy lifestyle · Stay at a healthy weight to avoid strain on your back. · Do not smoke. Smoking increases the risk of osteoporosis, which weakens the spine. If you need help quitting, talk to your doctor about stop-smoking programs and medicines. These can increase your chances of quitting for good. Where can you learn more? Go to http://yomaira-ruth.info/. Enter L315 in the search box to learn more about \"Learning About How to Have a Healthy Back. \" Current as of: November 29, 2017 Content Version: 11.7 © 5356-0853 [employer]. Care instructions adapted under license by [employer] (which disclaims liability or warranty for this information). If you have questions about a medical condition or this instruction, always ask your healthcare professional. [address] any warranty or liability for your use of this information. Introducing [facility]! Dear Marquis Gardiner: Thank you for requesting a Moozey account. Our records indicate that you already have an active Moozey account. You can access your account anytime at [email or web address]. [email or web address] network [email or web address]/[employer] Did you know that you can access your hospital and ER discharge instructions at any time in Moozey? You can also review all of your test results from your hospital stay or ER visit. Additional Information If you have questions, please visit the Frequently Asked Questions section of the Moozey website at [email or web address] Remember, Moozey is NOT to be used for urgent needs. For medical emergencies, dial 911. Now available from your iPhone and Android! Please provide this summary of care documentation to your next provider. Your primary care clinician is listed as Tree Morocho. If you have any questions after today's visit, please call [phone].

## 2018-07-30 NOTE — PROGRESS NOTES
Isabel Flores is a 58 y.o. male (: 1955) presenting to address: Chief Complaint Patient presents with  ED Follow-up Vitals:  
 18 1030 BP: 100/58 Pulse: 68 Resp: 18 Temp: 98.2 °F (36.8 °C) TempSrc: Oral  
SpO2: 96% Weight: 246 lb (111.6 kg) Height: 6' 5\" (1.956 m) PainSc:   7 PainLoc: Back Learning Assessment:  
 
Learning Assessment 2015 PRIMARY LEARNER Patient HIGHEST LEVEL OF EDUCATION - PRIMARY LEARNER  SOME COLLEGE PRIMARY LANGUAGE ENGLISH  
LEARNER PREFERENCE PRIMARY LISTENING  
ANSWERED BY patient RELATIONSHIP SELF Depression Screening: PHQ over the last two weeks 2018 Little interest or pleasure in doing things Not at all Feeling down, depressed, irritable, or hopeless Not at all Total Score PHQ 2 0 Fall Risk Assessment:  
 
Fall Risk Assessment, last 12 mths 2017 Able to walk? Yes Fall in past 12 months? No  
 
Abuse Screening:  
 
Abuse Screening Questionnaire 2017 Do you ever feel afraid of your partner? [place] Are you in a relationship with someone who physically or mentally threatens you? [place] [employer] Is it safe for you to go home? [facility] Coordination of Care Questionaire: 1. Have you been to the ER, urgent care clinic since your last visit? Hospitalized since your last visit? YES [facility] [place] 18 2. Have you seen or consulted any other health care providers outside of the [address] [place] [address] since your last visit? Include any pap smears or colon screening. NO Advanced Directive: 1. Do you have an Advanced Directive? YES 
 
2. Would you like information on Advanced Directives?  NO

## 2018-07-30 NOTE — LETTER
NOTIFICATION RETURN TO WORK / SCHOOL 
 
7/30/2018 10:54 AM 
 
Mr. Corrie Calderón [address] [address] To Whom It May Concern: 
 
Corrie Calderón is currently under the care of [address] [place] [address]. He will return to work/school on: 8/6/18 If there are questions or concerns please have the patient contact our office. Sincerely, Beba Koroma MD

## 2018-08-01 ENCOUNTER — DOCUMENTATION ONLY (OUTPATIENT)
Dept: FAMILY MEDICINE CLINIC | Age: 63
End: 2018-08-01

## 2018-08-20 ENCOUNTER — HOSPITAL ENCOUNTER (OUTPATIENT)
Dept: PHYSICAL THERAPY | Age: 63
Discharge: HOME | End: 2018-08-20
Payer: OTHER GOVERNMENT

## 2018-08-20 PROCEDURE — 97162 PT EVAL MOD COMPLEX 30 MIN: CPT

## 2018-08-20 PROCEDURE — 97110 THERAPEUTIC EXERCISES: CPT

## 2018-08-20 PROCEDURE — 97535 SELF CARE MNGMENT TRAINING: CPT

## 2018-08-20 NOTE — PROGRESS NOTES
PHYSICAL THERAPY - DAILY TREATMENT NOTE    Patient Name: Isabel Floers        Date: 2018  : 1955   YES Patient  Verified  Visit #:     Insurance: Payor:  / Plan: [employer] [facility] [address] AND DEPENDENTS / Product Type:  /      In time: 5:59 Out time: 6:37   Total Treatment Time: 38     Medicare Time Tracking (below)   Total Timed Codes (min):  na 1:1 Treatment Time:  na     TREATMENT AREA =  LS    SUBJECTIVE    Pain Level (on 0 to 10 scale):  2-3  / 10   Medication Changes/New allergies or changes in medical history, any new surgeries or procedures? NO    If yes, update Summary List   Subjective Functional Status/Changes:  []  No changes reported     See POC          OBJECTIVE    10 min Therapeutic Exercise:  [x]  See flow sheet   Rationale:      increase ROM, increase strength and reduce pain to improve the patients ability to ADL, walk/stand     8 min Self Care:  instructed in therapy process, injury progression, self care, brace use   Rationale: To improve expectations with rehab and injury understanding     Other Objective/Functional Measures:    Shown and performed HEP     Post Treatment Pain Level (on 0 to 10) scale:   2-3 / 10     ASSESSMENT  Assessment/Changes in Function:     See POC     []  See Progress Note/Recertification   Patient will continue to benefit from skilled PT services to modify and progress therapeutic interventions, address functional mobility deficits, address ROM deficits, address strength deficits, analyze and address soft tissue restrictions, analyze and cue movement patterns, analyze and modify body mechanics/ergonomics and assess and modify postural abnormalities to attain goals per POC.    Progress toward goals / Updated goals:    See POC     PLAN  [x]  Upgrade activities as tolerated YES Continue plan of care   []  Discharge due to :    []  Other:      Therapist: Romain Turk PT, DPT, OCS, CSCS    Date: 2018 Time: 6:00 PM

## 2018-08-20 NOTE — PROGRESS NOTES
[facility] PHYSICAL THERAPY  [address] [place] [address] 51, [address],Saniya Rai, 70 [facility] - Phone: [phone]  Fax: [phone] / 0112 [facility]  Patient Name: Selene Keane : 1955   Medical   Diagnosis: Low back pain Treatment Diagnosis: Low back pain [M54.5]   Onset Date: Ongoing, 18     Referral Source: Isamar Vizcaino MD Start of Care [facility]): 2018   Prior Hospitalization: See medical history Provider #: [id number]   Prior Level of Function: Long history of LBP, prior to 18 no significant difficulty with sitting or bending   Comorbidities: HTN, ETOH, recent weight change   Medications: Verified on Patient Summary List   The Plan of Care and following information is based on the information from the initial evaluation.   ===========================================================================================  Assessment / black information:  Selene Keane is a 58 y.o.  yo male with Dx: LBP, who reports 18 when rising from bending being unable to straighten up fully with sig pain. Next morning diff rising from bed, then could not get out of sitting position and needing to call EMT. Pt rates pain as 10/10 max, 1/10 at best, 2-3/10 today, located across lumbar L3-S1, denies pain down LEs. Pt reports still having difficulty rising from flexed position, diff sitting prolonged, wearing back brace for support and postural reminder. Pt attempting to use a standing desk at work which does help as sitting for prolonged is difficult for him. PMHx: ongoing LBP with last going out in . Objective: FOTO score = 1 (an established functional score where 100 = no disability). Pt guarded entering clinic wearing back brace and showing reduced trunk rotation with gait. LS flex is full however guarded/hesitant and difficulty rising.   Reduced LS extension, prone with 2 pillows under waist was comfortable position however removing one pillow or prone on elbows increased some pain. Painful Ely test (prone knee flexion) as LS. Some tenderness noted at central L4-S1, denied at paraspinals. Slump with pain upon returning to upright position. Neg SLR. Painful and unable to do supine bridge with c/o pain at LS. LE strength normal w/o pain. Pt instructed in HEP and will f/u in clinic for PT.  ===========================================================================================  Eval Complexity: History MEDIUM  Complexity : 1-2 comorbidities / personal factors will impact the outcome/ POC ;  Examination  HIGH Complexity : 4+ Standardized tests and measures addressing body structure, function, activity limitation and / or participation in recreation ; Presentation MEDIUM Complexity : Evolving with changing characteristics ; Decision Making HIGH Complexity : FOTO score of 1- 25 ; Overall Complexity MEDIUM  Problem List: pain affecting function, decrease ROM, decrease strength, impaired gait/ balance, decrease ADL/ functional abilitiies, decrease activity tolerance and decrease flexibility/ joint mobility FOTO = 1  Treatment Plan may include any combination of the following: Therapeutic exercise, Therapeutic activities, Neuromuscular re-education, Physical agent/modality, Gait/balance training, Manual therapy, Patient education and Self Care training  Patient / Family readiness to learn indicated by: asking questions, trying to perform skills and interest  Persons(s) to be included in education: patient (P)  Barriers to Learning/Limitations: no  Measures taken:    Patient Goal (s): \"strengthen core, loosen to make back more flexible\"   Patient self reported health status: good  Rehabilitation Potential: good   Short Term Goals: To be accomplished in  1-2  weeks:  1. Independent with HEP. 2. Decrease max pain 25-50% to assist with flexing trunk for ADLs, including dressing.  Long Term Goals:  To be accomplished in  4-6  weeks:  1. Decrease max pain 50-75% to assist with sitting > 20 minutes and min difficulty rising and bending forward to lift items off ground. 2.  Improve FOTO Functional Status Score by 37 points in order to show significant functional improvement. 3.  Will rate a +5 on Global Rating of Change and be prepared to DC to HEP. Frequency / Duration:   Patient to be seen  2-3  times per week for 4-6  weeks:  Patient / Caregiver education and instruction: self care and exercises  G-Codes (GP): SHE  Therapist Signature: Armani Gaona PT, DPT, OCS, CSCS Date: 4/50/4782   Certification Period: NA Time: 7:11 PM   ===========================================================================================  I certify that the above Physical Therapy Services are being furnished while the patient is under my care. I agree with the treatment plan and certify that this therapy is necessary. Physician Signature:        Date:       Time:     Please sign and return to [facility] or you may fax the signed copy to [phone]. Thank you.

## 2018-08-27 ENCOUNTER — HOSPITAL ENCOUNTER (OUTPATIENT)
Dept: PHYSICAL THERAPY | Age: 63
Discharge: HOME | End: 2018-08-27
Payer: OTHER GOVERNMENT

## 2018-08-27 PROCEDURE — 97140 MANUAL THERAPY 1/> REGIONS: CPT

## 2018-08-27 PROCEDURE — 97110 THERAPEUTIC EXERCISES: CPT

## 2018-08-27 NOTE — PROGRESS NOTES
PHYSICAL THERAPY - DAILY TREATMENT NOTE    Patient Name: Johanny Ramachandran        Date: 2018  : 1955   YES Patient  Verified  Visit #:      18  Insurance: Payor:  / Plan: [employer] [facility] [address] AND DEPENDENTS / Product Type:  /      In time: 4:11 Out time: 4:54   Total Treatment Time: 43     Medicare/[facility] Time Tracking (below)   Total Timed Codes (min):  na 1:1 Treatment Time:  na     TREATMENT AREA =  Low back pain [M54.5]    SUBJECTIVE  Pain Level (on 0 to 10 scale):  0  / 10   Medication Changes/New allergies or changes in medical history, any new surgeries or procedures? NO    If yes, update Summary List   Subjective Functional Status/Changes:  []  No changes reported     Did HEP a few days then it had increased soreness  Standing exts feel fine; most difficult ex is prone hip ext and had HS spasm a couple of times          OBJECTIVE    8 Min Manual Therapy: bilat quad, HS stretches   Rationale:      decrease pain, increase ROM and increase tissue extensibility to improve patient's ability to sit/stand prolonged    35 min Therapeutic Exercise:  [x]  See flow sheet   Rationale:      increase ROM and increase strength to improve the patients ability to flex and lift      min Patient Education:  YES  Reviewed HEP   []  Progressed/Changed HEP based on: Other Objective/Functional Measures:     Added march with draw in  No pain with PPU and no pain with prone quad stretch (both improved from IE)     Post Treatment Pain Level (on 0 to 10) scale:   0  / 10     ASSESSMENT  Assessment/Changes in Function:     Improved noe to quad stretch and ext exs     []  See Progress Note/Recertification   Patient will continue to benefit from skilled PT services to modify and progress therapeutic interventions, address functional mobility deficits, address ROM deficits, address strength deficits, analyze and address soft tissue restrictions and analyze and cue movement patterns to attain remaining goals.    Progress toward goals / Updated goals:    indep with HEP  STG 2 progressing with less pain with ext exs     PLAN  [x]  Upgrade activities as tolerated YES Continue plan of care   []  Discharge due to :    []  Other:      Therapist: Armani Gaona PT, DPT, OCS, CSCS    Date: 8/27/2018 Time: 4:15 PM     Future Appointments  Date Time Provider Ame Alba   8/27/2018 4:30 PM Griselda Bliss, PT [facility]   8/31/2018 10:00 AM [facility], PT [facility]   9/4/2018 2:00 PM [address] [place] [address], PT [facility]   9/7/2018 1:30 PM [address] [place] [address], PT [facility]   9/17/2018 4:00 PM Griselda Bliss, PT [facility]   9/20/2018 2:30 PM [facility], PT [facility]   9/24/2018 4:00 PM Griselda Bliss, PT [facility]   9/26/2018 3:00 PM [address] [facility], PT [facility]   4/26/2019 1:15 PM Mika Greene MD [facility]

## 2018-08-31 ENCOUNTER — HOSPITAL ENCOUNTER (OUTPATIENT)
Dept: PHYSICAL THERAPY | Age: 63
Discharge: HOME | End: 2018-08-31
Payer: OTHER GOVERNMENT

## 2018-08-31 DIAGNOSIS — M54.16 LUMBAR RADICULAR PAIN: ICD-10-CM

## 2018-08-31 PROCEDURE — 97140 MANUAL THERAPY 1/> REGIONS: CPT

## 2018-08-31 PROCEDURE — 97110 THERAPEUTIC EXERCISES: CPT

## 2018-08-31 NOTE — PROGRESS NOTES
PHYSICAL THERAPY - DAILY TREATMENT NOTE Patient Name: Luigi Burgos        Date: 2018 : 1955   yes Patient  Verified Visit #:   3   of   18  Insurance: Payor:  / Plan: [employer]rice Dodd DEPENDENTS / Product Type: Dorma Heman / In time: 9:55 Out time: 10:36 Total Treatment Time: 41 Medicare/[facility] Time Tracking (below) Total Timed Codes (min):  na 1:1 Treatment Time:  na  
 
TREATMENT AREA =  Low back pain [M54.5] SUBJECTIVE Pain Level (on 0 to 10 scale):  0  / 10 Medication Changes/New allergies or changes in medical history, any new surgeries or procedures?    no  If yes, update Summary List  
Subjective Functional Status/Changes:  []  No changes reported Pt reports overall his back is not doing bad but mostly feels pain when transitioning from sit to stand. Reports soreness after last visit but no inc in back pain. OBJECTIVE 31 min Therapeutic Exercise:  [x]  See flow sheet Rationale:      increase ROM and increase strength to improve the patients ability to sit to stand 10 min Manual Therapy: Hs str, prone quad str B, sacral distraction/ext mobs, B L4/5 multifidi release Rationale:      decrease pain, increase ROM, increase tissue extensibility and decrease trigger points to improve patient's ability to sit to stand 
   
 
 min Patient Education:  yes  Reviewed HEP []  Progressed/Changed HEP based on: Other Objective/Functional Measures: 
 
Performed 5x single UE and LE lifts in quad f/b 5x UE/LE simultaneously Pt report some relief w/ sacral ext/distraction mobs No ttp to lumbosacral musculature Post Treatment Pain Level (on 0 to 10) scale:   0  / 10 ASSESSMENT Assessment/Changes in Function: C/c cont to be pain w/ transitioning sit to stand, located across lumbosacral junction. No noticeable lumbopelvic obliquities noted 
  
[]  See Progress Note/Recertification Patient will continue to benefit from skilled PT services to modify and progress therapeutic interventions, address functional mobility deficits, address ROM deficits, address strength deficits, analyze and address soft tissue restrictions, analyze and cue movement patterns, analyze and modify body mechanics/ergonomics, assess and modify postural abnormalities and instruct in home and community integration to attain remaining goals. Progress toward goals / Updated goals: 
 
Reports compliance to HEP, STG #1 met PLAN 
[]  Upgrade activities as tolerated yes Continue plan of care  
[]  Discharge due to :   
[]  Other:   
 
Therapist: Phuong Lewis, PT Date: 8/31/2018 Time: 10:05 AM  
 
Future Appointments Date Time Provider Ame Alba 9/4/2018 2:00 PM Phuong Lewis, PT [facility]  
9/7/2018 1:30 PM Shankar Bustos [facility]  
9/17/2018 4:00 PM Genet Herrera PT [facility]  
9/20/2018 2:30 PM Shankar Bustos [facility]  
9/24/2018 4:00 PM Genet Herrera PT [facility]  
9/26/2018 3:00 PM [address], PT [facility]  
4/26/2019 1:15 PM Thierry Vaca MD [address]

## 2018-09-02 DIAGNOSIS — E78.2 MIXED HYPERCHOLESTEROLEMIA AND HYPERTRIGLYCERIDEMIA: ICD-10-CM

## 2018-09-02 DIAGNOSIS — I10 BENIGN ESSENTIAL HTN: ICD-10-CM

## 2018-09-04 ENCOUNTER — HOSPITAL ENCOUNTER (OUTPATIENT)
Dept: PHYSICAL THERAPY | Age: 63
Discharge: HOME | End: 2018-09-04
Payer: OTHER GOVERNMENT

## 2018-09-04 PROCEDURE — 97140 MANUAL THERAPY 1/> REGIONS: CPT

## 2018-09-04 PROCEDURE — 97110 THERAPEUTIC EXERCISES: CPT

## 2018-09-04 RX ORDER — ROSUVASTATIN CALCIUM 20 MG/1
TABLET, COATED ORAL
Qty: 90 TAB | Refills: 1 | Status: SHIPPED | OUTPATIENT
Start: 2018-09-04 | End: 2019-03-03 | Stop reason: SDUPTHER

## 2018-09-04 RX ORDER — TRIAMTERENE AND HYDROCHLOROTHIAZIDE 75; 50 MG/1; MG/1
TABLET ORAL
Qty: 90 TAB | Refills: 1 | Status: SHIPPED | OUTPATIENT
Start: 2018-09-04 | End: 2018-11-30 | Stop reason: DRUGHIGH

## 2018-09-04 NOTE — PROGRESS NOTES
PHYSICAL THERAPY - DAILY TREATMENT NOTE Patient Name: Kat De Oliveira        Date: 2018 : 1955   yes Patient  Verified Visit #:     Insurance: Payor:  / Plan: [employer]rice Dodd DEPENDENTS / Product Type: Thuy Malagon / In time: 2:00 Out time: 2:43 Total Treatment Time: 37 Medicare/[facility] Time Tracking (below) Total Timed Codes (min):  na 1:1 Treatment Time:  na  
 
TREATMENT AREA =  Low back pain [M54.5] SUBJECTIVE Pain Level (on 0 to 10 scale):  1  / 10 Medication Changes/New allergies or changes in medical history, any new surgeries or procedures?    no  If yes, update Summary List  
Subjective Functional Status/Changes:  []  No changes reported Pt reports he felt he was able to do more this weekend but did not push it. Reports continued pain w/ transitional movements and bending forward. OBJECTIVE 28 min Therapeutic Exercise:  [x]  See flow sheet Rationale:      increase ROM and increase strength to improve the patients ability to sit to stand 15 min Manual Therapy: MET to correct L post/R ant innom, shotgun tech, L5/S1 distraction, L4/5 multifidi release B, L sacral gapping Rationale:      decrease pain, increase ROM, increase tissue extensibility and decrease trigger points to improve patient's ability to transfer 
 
 min Patient Education:  yes  Reviewed HEP []  Progressed/Changed HEP based on: Other Objective/Functional Measures: 
 
Noted significant pelvic obliquity, unable to correct fully w/ MET but did not improvement Added hip hinge w/ dowel to improve mechanics for transfers Inc pain reported to L4/5 PA mob Post Treatment Pain Level (on 0 to 10) scale:   0  / 10 ASSESSMENT Assessment/Changes in Function:  
 
Assess response to added exercise as well as SIJ treatment to determine further direction of POC []  See Progress Note/Recertification Patient will continue to benefit from skilled PT services to modify and progress therapeutic interventions, address functional mobility deficits, address ROM deficits, address strength deficits, analyze and address soft tissue restrictions, analyze and cue movement patterns, analyze and modify body mechanics/ergonomics, assess and modify postural abnormalities and instruct in home and community integration to attain remaining goals. Progress toward goals / Updated goals: 
 
Slow progress towards goals thus far PLAN 
[]  Upgrade activities as tolerated yes Continue plan of care  
[]  Discharge due to :   
[]  Other:   
 
Therapist: Karen Doyle PT Date: 9/4/2018 Time: 1:55 PM  
 
Future Appointments Date Time Provider Ame Alba 9/4/2018 2:00 PM Karen Doyle PT [facility]  
9/7/2018 1:30 PM Shankar Bustos [facility]  
9/17/2018 4:00 PM Carine Aggarwal PT [facility]  
9/20/2018 2:30 PM Shankar Bustos [facility]  
9/24/2018 4:00 PM Carine Aggarwal PT [facility]  
9/26/2018 3:00 PM Karen Doyle PT [facility]  
4/26/2019 1:15 PM Shireen Sal MD [address] [employer]

## 2018-09-07 ENCOUNTER — HOSPITAL ENCOUNTER (OUTPATIENT)
Dept: PHYSICAL THERAPY | Age: 63
Discharge: HOME | End: 2018-09-07
Payer: OTHER GOVERNMENT

## 2018-09-07 PROCEDURE — 97140 MANUAL THERAPY 1/> REGIONS: CPT

## 2018-09-07 PROCEDURE — 97110 THERAPEUTIC EXERCISES: CPT

## 2018-09-07 NOTE — PROGRESS NOTES
PHYSICAL THERAPY - DAILY TREATMENT NOTE Patient Name: Alexei Irish        Date: 2018 : 1955   yes Patient  Verified Visit #:      18  Insurance: Payor:  / Plan: NASOFOR[employer]atrice Dodd DEPENDENTS / Product Type: Alan Polo / In time: 1:05 Out time: 1:58 Total Treatment Time: 48 Medicare/[facility] Time Tracking (below) Total Timed Codes (min):  na 1:1 Treatment Time:  na  
 
TREATMENT AREA =  Low back pain [M54.5] SUBJECTIVE Pain Level (on 0 to 10 scale):  0  / 10 Medication Changes/New allergies or changes in medical history, any new surgeries or procedures?    no  If yes, update Summary List  
Subjective Functional Status/Changes:  []  No changes reported Pt reports no change in his sx, continues to report pain w/ transitional movements located just off center to R of distal l/s. OBJECTIVE 38 min Therapeutic Exercise:  [x]  See flow sheet Rationale:      increase ROM and increase strength to improve the patients ability to sit to stand 15 min Manual Therapy: DTM to B glute max, l/s milton, R L3-5 multifidi release, R L4/5 facet gapping, hip IR/ER str in prone Rationale:      decrease pain, increase ROM, increase tissue extensibility and decrease trigger points to improve patient's ability to complete transfers, transitional movements 
 
 min Patient Education:  yes  Reviewed HEP []  Progressed/Changed HEP based on: Other Objective/Functional Measures: No sig ttp to lumbar musculature nor relief w/ facet gapping Post Treatment Pain Level (on 0 to 10) scale:   0  / 10 ASSESSMENT Assessment/Changes in Function:  
 
Discussed benefits of DN in attempting to reduce multifidi tone; will plan to incorporate at NV to assess any changes in transitional movement following release 
  
[]  See Progress Note/Recertification Patient will continue to benefit from skilled PT services to modify and progress therapeutic interventions, address functional mobility deficits, address ROM deficits, address strength deficits, analyze and address soft tissue restrictions, analyze and cue movement patterns, analyze and modify body mechanics/ergonomics, assess and modify postural abnormalities and instruct in home and community integration to attain remaining goals. Progress toward goals / Updated goals: No significant progress towards goals noted thus far, c/c cont to be pain w/ rising to stand PLAN 
[]  Upgrade activities as tolerated yes Continue plan of care  
[]  Discharge due to :   
[]  Other:   
 
Therapist: Martin Hatfield PT Date: 9/7/2018 Time: 1:58 PM  
 
Future Appointments Date Time Provider Ame Alba 9/17/2018 4:00 PM [address] [place] [address], PT [facility]  
9/20/2018 2:30 PM Martin Hatfield, PT [facility]  
9/24/2018 4:00 PM [address] [place] [address], PT [facility]  
9/26/2018 3:00 PM Martin Hatfield, PT [facility]  
4/26/2019 1:15 PM Shawna William MD [address] [facility]

## 2018-09-11 NOTE — PROGRESS NOTES
[address] PHYSICAL THERAPY 
[address] [place] [address], [address]aine[address],[facility], 70 [facility] - Phone: [phone]  Fax: [phone] Request for use of Dry Needling/Intramuscular Manual Therapy Patient Name: Kamar Reyes : 1955 Treatment/Medical Diagnosis: Low back pain [M54.5] Referral Source: Mandi Hudson MD    
Date of Initial Visit: 18 Attended Visits: 5 Missed Visits: 0 Based on findings from the physical therapy examination and evaluation, the evaluating therapist believes the patient, Kamar Reyes would benefit from including Dry Needling as part of the plan of care. Dry needling is a treatment technique utilized in conjunction with other PT interventions to inactivate myofascial trigger points and the pain and dysfunction they cause. Dry Needling is an advanced procedure that requires additional training of intensive course work. PROCEDURE: 
        -  Solid filament sterile needle (typically 0.3mm/30 gauge) inserted into a trigger point -  Repeated movements inactivate the trigger points, taking 30-60 seconds per site Typically consists of 1 dry needling session per week and a possible second treatment including muscle re-education, flexibility, strengthening and other manual techniques to facilitate the benefits of dry needling BENEFITS: 
? Inactivation of trigger points ? Decreased pain ? Increased muscle length ? Improved movement patterns ? Restoration of function POTENTIAL RISKS: 
? Post-needling soreness ? Infection ? Bruising/bleeding ? Penetration of a nerve ? Pneumothorax All treating PTs have been thoroughly educated in avoiding adverse reactions If you agree with this recommendation, please sign the attached prescription form and fax it to us at [phone].   If you have questions or concerns regarding dry needling or any other treatment we may be providing, please contact us at [phone]. Thank you for allowing us to assist in the care of your patient. Therapist Signature: Carrie James PT Date: 9/11/2018 Time: 1:04 PM  
NOTE TO PHYSICIAN:  PLEASE COMPLETE THE ORDERS BELOW AND FAX TO [facility] Physical Therapy: [phone] If you are unable to process this request in 24 hours please contact our office: [phone] Check box I have read the above request and AGREE to the recommendation of including dry needling as part of the plan of care. I have read the above request and DO NOT AGREE to including dry needling as part of the plan of care. I have read the above report and request that my patient continue therapy with the following changes/special instructions: _________________________________________________ Physician Signature:        Date:       Time:

## 2018-09-17 ENCOUNTER — HOSPITAL ENCOUNTER (OUTPATIENT)
Dept: PHYSICAL THERAPY | Age: 63
Discharge: HOME | End: 2018-09-17
Payer: OTHER GOVERNMENT

## 2018-09-17 PROCEDURE — 97140 MANUAL THERAPY 1/> REGIONS: CPT

## 2018-09-17 PROCEDURE — 97110 THERAPEUTIC EXERCISES: CPT

## 2018-09-17 NOTE — PROGRESS NOTES
PHYSICAL THERAPY - DAILY TREATMENT NOTE Patient Name: Bev Spain        Date: 2018 : 1955   YES Patient  Verified Visit #:     Insurance: Payor: Olu Reynoso / Plan: [id number] DEPENDENTS / Product Type: Christiano Stack / In time: 3:50 Out time: 4:35 Total Treatment Time: 45 Medicare/[facility] Time Tracking (below) Total Timed Codes (min):  na 1:1 Treatment Time:  na  
 
TREATMENT AREA =  Low back pain [M54.5] SUBJECTIVE Pain Level (on 0 to 10 scale):  1  / 10 Medication Changes/New allergies or changes in medical history, any new surgeries or procedures? NO    If yes, update Summary List  
Subjective Functional Status/Changes:  []  No changes reported Able to move generator cautiosouly bu tw/o pain OBJECTIVE 
 
8 Min Manual Therapy: bilat HS and quad stretch Rationale:      decrease pain, increase ROM and increase tissue extensibility to improve patient's ability to stand/transfer 37 min Therapeutic Exercise:  [x]  See flow sheet Rationale:      increase ROM and increase strength to improve the patients ability to lift/transfer  
 
 min Patient Education:  Kasia Galvan []  Progressed/Changed HEP based on: Other Objective/Functional Measures: FOTO = 56 No c/o pain with exs or manual 
  
Post Treatment Pain Level (on 0 to 10) scale:   0  / 10 ASSESSMENT Assessment/Changes in Function:  
 
Improved FOTO and good noe to exs 
  
[]  See Progress Note/Recertification Patient will continue to benefit from skilled PT services to modify and progress therapeutic interventions, address functional mobility deficits, address ROM deficits, address strength deficits and analyze and address soft tissue restrictions to attain remaining goals. Progress toward goals / Updated goals: LTG 2 met, improved FOTO  
 
PLAN [x]  Upgrade activities as tolerated YES Continue plan of care  
[]  Discharge due to :   
[]  Other: Therapist: Meena Baugh PT, DPT, OCS, CSCS Date: 9/17/2018 Time: 3:52 PM  
 
Future Appointments Date Time Provider Ame Anjali 9/17/2018 4:00 PM [facility], PT [facility]  
9/20/2018 2:30 PM [address] [facility], PT [facility]  
9/24/2018 4:00 PM [facility], PT [facility]  
9/26/2018 3:00 PM [address] [facility], PT [facility]  
4/26/2019 1:15 PM Luisana Dixon MD [address] [facility]

## 2018-09-17 NOTE — PROGRESS NOTES
[address] PHYSICAL THERAPY 
[address] [place] [address] 51, [place] 201,[facility], 70 [facility] - Phone: [phone]  Fax: [phone] PROGRESS NOTE Patient Name: Olivia Cerda : 1955 Treatment/Medical Diagnosis: Low back pain [M54.5] Referral Source: Ronnie Gamble MD    
Date of Initial Visit: 18 Attended Visits: 6 Missed Visits: 0  
SUMMARY OF TREATMENT Has consisted of initial evaluation, HEP, therapeutic exercises for ROM/stretching, core stability, back education. CURRENT STATUS Olivia Cerda has made good progress thus far with therapy. He reports max pain now 3-4/10, was 10/10. He is able to perform ADLs and moderate to heavy household activity with guarding and using back brace. He is indep with his HEP. His main complaint is pain with rising from a chair and needing to wear back brace for support during the day. Goal/Measure of Progress Goal Met? 1. Decrease max pain 50-75% to assist with sitting > 20 minutes and min difficulty rising and bending forward to lift items off ground. Status at last Eval: Pain = 10/10 max Current Status: Pain = 3-4/10 yes 2. Improve FOTO Functional Status Score by 37 points in order to show significant functional improvement. Status at last Eval: FOTO = 1 Current Status: FOTO = 56 yes 3. Will rate a +5 on Global Rating of Change and be prepared to DC to HEP. Status at last Eval: NA Current Status: Not formally tested, not yet ready to DC no New Goals to be achieved in __3-4__  Weeks: 1. Decrease max pain to <=2/10 to improving transfers and ADLs/household duties. 2. Improve FOTO and additional 9 points to show signficant functional improvement. 3.  Continue goal #3 above. RECOMMENDATIONS We would like to continue therapy 2-3 x per week for an additional 3-4 weeks for further gains in pain, function and progress to HEP when appropriate. Margarita Morrison If you have any questions/comments please contact us directly at [phone]. Thank you for allowing us to assist in the care of your patient. Therapist Signature: Carly Cui PT, DPT, OCS, CSCS Date: 9/17/2018 Time: 3:53 PM  
NOTE TO PHYSICIAN:  PLEASE COMPLETE THE ORDERS BELOW AND FAX TO [facility] Physical Therapy: [phone] If you are unable to process this request in 24 hours please contact our office: [phone] 
 
___ I have read the above report and request that my patient continue as recommended.  
___ I have read the above report and request that my patient continue therapy with the following changes/special instructions:_________________________________________________________  
___ I have read the above report and request that my patient be discharged from therapy.   
 
Physician Signature:       Date:      Time:

## 2018-09-20 ENCOUNTER — HOSPITAL ENCOUNTER (OUTPATIENT)
Dept: PHYSICAL THERAPY | Age: 63
Discharge: HOME | End: 2018-09-20
Payer: OTHER GOVERNMENT

## 2018-09-20 PROCEDURE — 97110 THERAPEUTIC EXERCISES: CPT

## 2018-09-20 PROCEDURE — 97140 MANUAL THERAPY 1/> REGIONS: CPT

## 2018-09-20 NOTE — PROGRESS NOTES
PHYSICAL THERAPY - DAILY TREATMENT NOTE Patient Name: Deanna Mcnair        Date: 2018 : 1955   yes Patient  Verified Visit #:     Insurance: Payor:  / Plan: Semaj Patrice Jackson DEPENDENTS / Product Type: Genna Smart / In time: 2:34 Out time: 3:17 Total Treatment Time: 37 Medicare/[facility] Time Tracking (below) Total Timed Codes (min):  na 1:1 Treatment Time:  na  
 
TREATMENT AREA =  Low back pain [M54.5] SUBJECTIVE Pain Level (on 0 to 10 scale):  0  / 10 Medication Changes/New allergies or changes in medical history, any new surgeries or procedures?    no  If yes, update Summary List  
Subjective Functional Status/Changes:  []  No changes reported Pt reports he is anxious to try dry needling. Cont to report pain w/ rising to stand after prolonged sitting. OBJECTIVE 28 min Therapeutic Exercise:  [x]  See flow sheet Rationale:      increase ROM, increase strength and improve coordination to improve the patients ability to complete transfers 15 min Manual Therapy: Dn per procedural note; B HS, quad str Rationale:      decrease pain, increase ROM, increase tissue extensibility and decrease trigger points to improve patient's ability to complete transfers Dry Needling Procedure Note Dry Needle Session Number:  1 Procedure: An intramuscular manual therapy (dry needling) and a neuro-muscular re-education treatment was done to deactivate myofascial trigger points, with a 15/30 gauge solid filament needle, under aseptic technique. Indication(s): [] Muscle spasms [] Myalgia/Myositis  [] Muscle cramps  
   [] Muscle imbalances [] TMD (TMJ) [x] Myofascial pain & dysfunction 
   [] Other: __ Chart reviewed for the following: 
Luan TRAMMELL, PT, have reviewed the medical history, summary list and precautions/contraindications for Deanna Mcnair. TIME OUT performed immediately prior to start of procedure: 2:34 pm (enter time the timeout was completed) Chantel TRAMMELL, PT, have performed the following reviews on Francheska Espinal prior to the start of the session:     
[x] Patient was identified by name and date of birth   
[x] Agreement on all muscles being treated was verified  
[x] Purpose of dry needling, side effects, possible complications, risks and benefits were explained to the patient [x] Procedure site(s) verified 
[x] Patient was positioned for comfort and draped for privacy [x] Informed Consent was signed (initial visit) and verified verbally (subsequent visits) [x] Patient was instructed on the need to report the use of blood thinners and/or immunosuppressant medications [x] How to respond to possible adverse effects of treatment 
[x] Self treatment of post needling soreness: ice, heat (moist heat, heat wraps) and stretching 
[x] Opportunity was given to ask any questions, all questions were answered Treatment: The following muscles were treated today: 
 
Right: Lumbar multifidi, iliocostalis Left: Lumbar multifidi, iliocostalis Patients response to todays treatment:  
[x]  LTRs  []  Muscle Relaxation  []  Pain Relief  []  Decreased Tinnitus 
[]  Decreased HAs [x]  Post needling soreness [x]  Increased ROM []  Other:   
 
 
 min Patient Education:  yes  Reviewed HEP []  Progressed/Changed HEP based on: Other Objective/Functional Measures: 
 
Initial dn session this visit; pt demo pain free lumbar AROM in all planes following Dec cueing req for neutral l/s in  quad multifidi this visit Post Treatment Pain Level (on 0 to 10) scale:   0  / 10 ASSESSMENT Assessment/Changes in Function:  
 
Pt reported no inc in pain following session. Declined modalities; advised ice x 10 minutes to dec post-needle soreness and inflammation. Will assess response at NV 
  
[]  See Progress Note/Recertification Patient will continue to benefit from skilled PT services to modify and progress therapeutic interventions, address functional mobility deficits, address ROM deficits, address strength deficits, analyze and address soft tissue restrictions, analyze and cue movement patterns, analyze and modify body mechanics/ergonomics, assess and modify postural abnormalities and instruct in home and community integration to attain remaining goals. Progress toward goals / Updated goals: 
 
Reporting dec pain level upon arrival to tx today; assess response to pain level w/ dry needling at [address] 
[]  Upgrade activities as tolerated yes Continue plan of care  
[]  Discharge due to :   
[]  Other:   
 
Therapist: Niko Daniel PT Date: 9/20/2018 Time: 3:22 PM  
 
Future Appointments Date Time Provider Ame Alba 9/24/2018 4:00 PM [address] [facility], PT [facility]  
9/26/2018 3:00 PM Niko Daniel PT [facility]  
4/26/2019 1:15 PM Leanne Phelan MD [address] [facility]

## 2018-09-24 ENCOUNTER — HOSPITAL ENCOUNTER (OUTPATIENT)
Dept: PHYSICAL THERAPY | Age: 63
Discharge: HOME | End: 2018-09-24
Payer: OTHER GOVERNMENT

## 2018-09-24 PROCEDURE — 97140 MANUAL THERAPY 1/> REGIONS: CPT

## 2018-09-24 PROCEDURE — 97110 THERAPEUTIC EXERCISES: CPT

## 2018-09-24 NOTE — PROGRESS NOTES
PHYSICAL THERAPY - DAILY TREATMENT NOTE Patient Name: Federica Patch        Date: 2018 : 1955   YES Patient  Verified Visit #:     Insurance: Payor:  / Plan: [employer] DEPENDENTS / Product Type: Christie Solorzano / In time: 3:50 Out time: 4:30 Total Treatment Time: 40 Medicare/[facility] Time Tracking (below) Total Timed Codes (min):  na 1:1 Treatment Time:  na  
 
TREATMENT AREA =  Low back pain [M54.5] SUBJECTIVE Pain Level (on 0 to 10 scale):  0  / 10 Medication Changes/New allergies or changes in medical history, any new surgeries or procedures? NO    If yes, update Summary List  
Subjective Functional Status/Changes:  []  No changes reported Able to do mod to heavy activities at home; improved rising from chair/car. Dry needle felt good with noticeable improvements after OBJECTIVE 
 
8 Min Manual Therapy: bilat HS, quad stretches Rationale:      decrease pain, increase ROM and increase tissue extensibility to improve patient's ability to bend/lift 32 min Therapeutic Exercise:  [x]  See flow sheet Rationale:      increase ROM, increase strength and improve coordination to improve the patients ability to lift/transfer  
 
 min Patient Education:  Nolan Canales []  Progressed/Changed HEP based on: Other Objective/Functional Measures: 
 
No c/o pain or diff with exs/therapy Still lacking >20 deg HS flexibility from vertical bilat with 90/90 HS stretch Post Treatment Pain Level (on 0 to 10) scale:   0  / 10 ASSESSMENT Assessment/Changes in Function:  
 
Improved function overall with subjective improvements in transfers and ADLs/household duties []  See Progress Note/Recertification Patient will continue to benefit from skilled PT services to modify and progress therapeutic interventions, address functional mobility deficits, address ROM deficits, address strength deficits, analyze and address soft tissue restrictions, analyze and cue movement patterns and analyze and modify body mechanics/ergonomics to attain remaining goals. Progress toward goals / Updated goals: LTG 1 progressing, 0 pain recently PLAN [x]  Upgrade activities as tolerated YES Continue plan of care  
[]  Discharge due to :   
[]  Other:   
 
Therapist: Vincent Fournier PT, DPT, OCS, CSCS Date: 9/24/2018 Time: 4:02 PM  
 
Future Appointments Date Time Provider Ame Alba 9/26/2018 3:00 PM Ph[address], PT [facility]  
10/1/2018 4:00 PM Jabari Rodriguez, PT [facility]  
10/5/2018 10:30 AM Mariya Hanover, PT [facility]  
10/9/2018 10:30 AM Mariya Barry, PT [facility]  
10/12/2018 1:00 PM Mariya Barry, PT [facility]  
10/16/2018 1:00 PM Mariya Hanover, PT [facility]  
10/19/2018 1:30 PM Mariya Hanover, PT [facility]  
10/23/2018 12:00 PM Mariya Hanover, PT [facility]  
10/26/2018 1:00 PM Mariya Hanover, PT [facility]  
4/26/2019 1:15 PM Nila Carlson MD [address]

## 2018-09-26 ENCOUNTER — APPOINTMENT (OUTPATIENT)
Dept: PHYSICAL THERAPY | Age: 63
End: 2018-09-26
Payer: OTHER GOVERNMENT

## 2018-09-27 ENCOUNTER — APPOINTMENT (OUTPATIENT)
Dept: PHYSICAL THERAPY | Age: 63
End: 2018-09-27
Payer: OTHER GOVERNMENT

## 2018-09-28 ENCOUNTER — HOSPITAL ENCOUNTER (OUTPATIENT)
Dept: PHYSICAL THERAPY | Age: 63
Discharge: HOME | End: 2018-09-28
Payer: OTHER GOVERNMENT

## 2018-09-28 PROCEDURE — 97140 MANUAL THERAPY 1/> REGIONS: CPT

## 2018-09-28 PROCEDURE — 97110 THERAPEUTIC EXERCISES: CPT

## 2018-09-28 NOTE — PROGRESS NOTES
PHYSICAL THERAPY - DAILY TREATMENT NOTE Patient Name: Thalia Glover        Date: 2018 : 1955   yes Patient  Verified Visit #:     Insurance: Payor:  / Plan: Sem[employer] Patrice Jackson DEPENDENTS / Product Type: Prashanth Glover / In time: 8:30 Out time: 9:13 Total Treatment Time: 37 Medicare/[facility] Time Tracking (below) Total Timed Codes (min):  na 1:1 Treatment Time:  na  
 
TREATMENT AREA =  Low back pain [M54.5] SUBJECTIVE Pain Level (on 0 to 10 scale):  0  / 10 Medication Changes/New allergies or changes in medical history, any new surgeries or procedures?    no  If yes, update Summary List  
Subjective Functional Status/Changes:  []  No changes reported Pt reports his back get less stiff w/ rising since addition of dry needling. OBJECTIVE 28 min Therapeutic Exercise:  [x]  See flow sheet Rationale:      increase ROM, increase strength, improve balance and increase proprioception to improve the patients ability to complete transfers, ADLs 15 min Manual Therapy: Dn per procedural note; B HS and quad str, t/s PA mob gr IV Rationale:      decrease pain, increase ROM, increase tissue extensibility and decrease trigger points to improve patient's ability to transfer Dry Needling Procedure Note Dry Needle Session Number:  0 Procedure: An intramuscular manual therapy (dry needling) and a neuro-muscular re-education treatment was done to deactivate myofascial trigger points, with a 15/30 gauge solid filament needle, under aseptic technique. Indication(s): [] Muscle spasms [x] Myalgia/Myositis  [] Muscle cramps  
   [] Muscle imbalances [] TMD (TMJ) [x] Myofascial pain & dysfunction 
   [] Other: __ Chart reviewed for the following: 
I, [address], PT, have reviewed the medical history, summary list and precautions/contraindications for Thalia Glover. TIME OUT performed immediately prior to start of procedure: 8:30 am (enter time the timeout was completed) Ginna TRAMMELL, PT, have performed the following reviews on Finas More prior to the start of the session:     
[x] Patient was identified by name and date of birth   
[x] Agreement on all muscles being treated was verified  
[x] Purpose of dry needling, side effects, possible complications, risks and benefits were explained to the patient [x] Procedure site(s) verified 
[x] Patient was positioned for comfort and draped for privacy [x] Informed Consent was signed (initial visit) and verified verbally (subsequent visits) [x] Patient was instructed on the need to report the use of blood thinners and/or immunosuppressant medications [x] How to respond to possible adverse effects of treatment 
[x] Self treatment of post needling soreness: ice, heat (moist heat, heat wraps) and stretching 
[x] Opportunity was given to ask any questions, all questions were answered Treatment: The following muscles were treated today: 
 
Right: T10-L5 multifidi, l/s iliocostalis Left: T10-L5 multifidi, l/s iliocostalis Patients response to todays treatment:  
[x]  LTRs  []  Muscle Relaxation  []  Pain Relief  []  Decreased Tinnitus 
[]  Decreased HAs [x]  Post needling soreness []  Increased ROM []  Other:   
 
 
 min Patient Education:  yes  Reviewed HEP []  Progressed/Changed HEP based on: Other Objective/Functional Measures: Added 1/2 kneel TB chop this visit; challenged to maintain midline and balance 1/2 kneel on L>R Post Treatment Pain Level (on 0 to 10) scale:   2 / 10 ASSESSMENT Assessment/Changes in Function:  
 
Pt responding well to dry needling. Cont to demo dec core stability though noted to be improving w/ each session 
  
[]  See Progress Note/Recertification Patient will continue to benefit from skilled PT services to modify and progress therapeutic interventions, address functional mobility deficits, address ROM deficits, address strength deficits, analyze and address soft tissue restrictions, analyze and cue movement patterns, analyze and modify body mechanics/ergonomics, assess and modify postural abnormalities and instruct in home and community integration to attain remaining goals. Progress toward goals / Updated goals: 
 
Progressing steadily towards LTG #1 PLAN 
[]  Upgrade activities as tolerated yes Continue plan of care  
[]  Discharge due to :   
[]  Other:   
 
Therapist: Naomi Padilla PT Date: 9/28/2018 Time: 8:50 AM  
 
Future Appointments Date Time Provider Ame Alba 10/1/2018 4:00 PM [address] [place] [address], PT [facility]  
10/5/2018 10:30 AM Arcelia Abarca, PT [facility]  
10/9/2018 10:30 AM Arcelia Abarca, PT [facility]  
10/12/2018 1:00 PM Arcelia Abarca, PT [facility]  
10/16/2018 1:00 PM Arcelia Abarca, PT [facility]  
10/19/2018 1:30 PM Arcelia Abarca, PT [facility]  
10/23/2018 12:00 PM Arcelia Abarca, PT [facility]  
10/26/2018 1:00 PM Arcelia Abarca, PT [facility]  
4/26/2019 1:15 PM Terry Velásquez MD [address] [facility]

## 2018-10-01 ENCOUNTER — APPOINTMENT (OUTPATIENT)
Dept: PHYSICAL THERAPY | Age: 63
End: 2018-10-01
Payer: OTHER GOVERNMENT

## 2018-10-02 ENCOUNTER — HOSPITAL ENCOUNTER (OUTPATIENT)
Dept: PHYSICAL THERAPY | Age: 63
Discharge: HOME | End: 2018-10-02
Payer: OTHER GOVERNMENT

## 2018-10-02 PROCEDURE — 97140 MANUAL THERAPY 1/> REGIONS: CPT

## 2018-10-02 PROCEDURE — 97110 THERAPEUTIC EXERCISES: CPT

## 2018-10-02 NOTE — PROGRESS NOTES
PHYSICAL THERAPY - DAILY TREATMENT NOTE Patient Name: Sushma Fisher        Date: 10/2/2018 : 1955   YES Patient  Verified Visit #:   10   of   18  Insurance: Payor: Shelia Sal / Plan: [id number] Patrice Jackson DEPENDENTS / Product Type: Dave Pond / In time: 4:50 Out time: 5:40 Total Treatment Time: 50 Medicare Time Tracking (below) Total Timed Codes (min):  na 1:1 Treatment Time:  na  
 
TREATMENT AREA =  Low back pain [M54.5] SUBJECTIVE Pain Level (on 0 to 10 scale):  0  / 10 Medication Changes/New allergies or changes in medical history, any new surgeries or procedures? NO    If yes, update Summary List  
Subjective Functional Status/Changes:  []  No changes reported No new c/o OBJECTIVE 30 min Therapeutic Exercise:  [x]  See flow sheet Rationale:      increase ROM, increase strength, improve balance and increase proprioception to improve the patients ability to complete transfers, ADLs  
  
15 min Manual Therapy: T/S mob, L5 L ERS, DTM R Gm  
Rationale:      decrease pain, increase ROM, increase tissue extensibility and decrease trigger points to improve patient's ability to transfer 
 min Patient Education:  YES  Reviewed HEP []  Progressed/Changed HEP based on: Other Objective/Functional Measures: 
 
Limited L t/s rot noted Difficulty engaging glutes with bridges Post Treatment Pain Level (on 0 to 10) scale:   0  / 10 ASSESSMENT Assessment/Changes in Function:  
 
Good noe to all Rx without increase in pain  
  
[]  See Progress Note/Recertification Patient will continue to benefit from skilled PT services to modify and progress therapeutic interventions, address functional mobility deficits, address ROM deficits, address strength deficits, analyze and address soft tissue restrictions, analyze and cue movement patterns, analyze and modify body mechanics/ergonomics and assess and modify postural abnormalities to attain remaining goals. Progress toward goals / Updated goals: 
 
Slowly progressing with pain reduction PLAN 
[]  Upgrade activities as tolerated YES Continue plan of care  
[]  Discharge due to :   
[]  Other:   
 
Therapist: Idalia Roa, PT, OCS, SCS, CSCS Date: 10/2/2018 Time: 11:22 AM  
 
 
Future Appointments Date Time Provider Ame Alba 10/2/2018 5:00 PM Penne Abo, PT [facility]  
10/5/2018 10:30 AM Penne Abo, PT [facility]  
10/9/2018 10:30 AM Penne Abo, PT [facility]  
10/12/2018 1:00 PM Penne Abo, PT [facility]  
10/16/2018 1:00 PM Penne Abo, PT [facility]  
10/19/2018 1:30 PM Penne Abo, PT [facility]  
10/23/2018 12:00 PM Penne Abo, PT [facility]  
10/26/2018 1:00 PM Penne Abo, PT [facility]  
4/26/2019 1:15 PM MD Alexander Silva

## 2018-10-03 ENCOUNTER — CLINICAL SUPPORT (OUTPATIENT)
Dept: FAMILY MEDICINE CLINIC | Age: 63
End: 2018-10-03

## 2018-10-03 DIAGNOSIS — E78.2 MIXED HYPERCHOLESTEROLEMIA AND HYPERTRIGLYCERIDEMIA: ICD-10-CM

## 2018-10-03 DIAGNOSIS — R97.20 ELEVATED PROSTATE SPECIFIC ANTIGEN (PSA): ICD-10-CM

## 2018-10-03 DIAGNOSIS — Z23 ENCOUNTER FOR IMMUNIZATION: Primary | ICD-10-CM

## 2018-10-03 DIAGNOSIS — I10 BENIGN ESSENTIAL HTN: Primary | ICD-10-CM

## 2018-10-03 DIAGNOSIS — I10 BENIGN ESSENTIAL HTN: ICD-10-CM

## 2018-10-03 NOTE — PROGRESS NOTES
Immunization/s administered 10/3/2018 by Lisette Rivera LPN with guardian's consent. Patient tolerated procedure well. No reactions noted. Seasonal flu, right deltoid.

## 2018-10-05 ENCOUNTER — HOSPITAL ENCOUNTER (OUTPATIENT)
Dept: PHYSICAL THERAPY | Age: 63
Discharge: HOME | End: 2018-10-05
Payer: OTHER GOVERNMENT

## 2018-10-05 PROCEDURE — 97110 THERAPEUTIC EXERCISES: CPT

## 2018-10-05 PROCEDURE — 97140 MANUAL THERAPY 1/> REGIONS: CPT

## 2018-10-05 NOTE — PROGRESS NOTES
PHYSICAL THERAPY - DAILY TREATMENT NOTE Patient Name: Nayla Monique        Date: 10/5/2018 : 1955   YES Patient  Verified Visit #:      18  Insurance: Payor:  / Plan: [employer] Patrice Jackson DEPENDENTS / Product Type: [employer] / In time: 10:05 Out time: 10:55 Total Treatment Time: 50 Medicare Time Tracking (below) Total Timed Codes (min):  na 1:1 Treatment Time:  na  
 
TREATMENT AREA =  Low back pain [M54.5] SUBJECTIVE Pain Level (on 0 to 10 scale):  1  / 10 Medication Changes/New allergies or changes in medical history, any new surgeries or procedures? NO    If yes, update Summary List  
Subjective Functional Status/Changes:  []  No changes reported Sore a little bit today OBJECTIVE 35 min Therapeutic Exercise:  [x]  See flow sheet Rationale:      increase ROM, increase strength and improve coordination to improve the patients ability to perform ADLs 10 min Manual Therapy: Shotgun tech, L5 L ERS quita, t/s mob Rationale:      decrease pain, increase ROM and increase tissue extensibility to improve patient's ability to perform ADLs Dry Needling Procedure Note Dry Needle Session Number:  82 Procedure: An intramuscular manual therapy (dry needling) and a neuro-muscular re-education treatment was done to deactivate myofascial trigger points, with a 15/30 gauge solid filament needle, under aseptic technique. Indication(s): [] Muscle spasms [] Myalgia/Myositis  [] Muscle cramps  
   [] Muscle imbalances [] TMD (TMJ) [] Myofascial pain & dysfunction 
   [] Other: __ Chart reviewed for the following: 
Rajani TRAMMELL PT, have reviewed the medical history, summary list and precautions/contraindications for Nayla Monique. TIME OUT performed immediately prior to start of procedure: 
10:10 (enter time the timeout was completed) Rajani TRAMMELL PT, have performed the following reviews on Memo ZAMUDIO Ira Marino prior to the start of the session:     
[x] Patient was identified by name and date of birth   
[x] Agreement on all muscles being treated was verified  
[x] Purpose of dry needling, side effects, possible complications, risks and benefits were explained to the patient [x] Procedure site(s) verified 
[x] Patient was positioned for comfort and draped for privacy [x] Informed Consent was signed (initial visit) and verified verbally (subsequent visits) [x] Patient was instructed on the need to report the use of blood thinners and/or immunosuppressant medications [x] How to respond to possible adverse effects of treatment 
[x] Self treatment of post needling soreness: ice, heat (moist heat, heat wraps) and stretching 
[x] Opportunity was given to ask any questions, all questions were answered Treatment: The following muscles were treated today: 
 
Right: L/S para/multifidus, Gm piri, HS Left: L/S para/multifidus, Gm , HS Patients response to todays treatment:  
[x]  LTRs  [x]  Muscle Relaxation  []  Pain Relief  []  Decreased Tinnitus 
[]  Decreased HAs []  Post needling soreness []  Increased ROM []  Other:   
 
 min Patient Education:  YES  Reviewed HEP []  Progressed/Changed HEP based on: Other Objective/Functional Measures: No cramp at HS with bridges after DN to HS Limited hir IR/ER B Post Treatment Pain Level (on 0 to 10) scale:   0  / 10 ASSESSMENT Assessment/Changes in Function:  
 
Good noe to all Rx without increase in pain  
  
[]  See Progress Note/Recertification Patient will continue to benefit from skilled PT services to modify and progress therapeutic interventions, address functional mobility deficits, address ROM deficits, address strength deficits, analyze and address soft tissue restrictions, analyze and cue movement patterns, analyze and modify body mechanics/ergonomics and assess and modify postural abnormalities to attain remaining goals. Progress toward goals / Updated goals: 
 
Slowly progressing with pain reduction PLAN 
[]  Upgrade activities as tolerated YES Continue plan of care  
[]  Discharge due to :   
[]  Other:   
 
Therapist: Jacob Nguyen, PT, OCS, SCS, CSCS Date: 10/5/2018 Time: 6:33 AM  
 
 
Future Appointments Date Time Provider Ame Alba 10/5/2018 10:30 AM Glinda Boast, PT [facility]  
10/9/2018 10:30 AM Glinda Boast, PT [facility]  
10/12/2018 1:00 PM Lucrecia Boast, PT [facility]  
10/16/2018 1:00 PM Lucrecia Campbellast, PT [facility]  
10/19/2018 1:30 PM Lucrecia Boast, PT [facility]  
10/23/2018 12:00 PM Lucrecia Hightowerast, PT [facility]  
10/26/2018 1:00 PM Glinda Boast, PT [facility]  
4/26/2019 1:15 PM Paulo Kendall MD [address] [facility]

## 2018-10-08 DIAGNOSIS — G47.50 PARASOMNIA: ICD-10-CM

## 2018-10-09 ENCOUNTER — HOSPITAL ENCOUNTER (OUTPATIENT)
Dept: PHYSICAL THERAPY | Age: 63
Discharge: HOME | End: 2018-10-09
Payer: OTHER GOVERNMENT

## 2018-10-09 PROCEDURE — 97140 MANUAL THERAPY 1/> REGIONS: CPT

## 2018-10-09 PROCEDURE — 97110 THERAPEUTIC EXERCISES: CPT

## 2018-10-09 RX ORDER — CLONAZEPAM 0.5 MG/1
0.5 TABLET ORAL
Qty: 90 TAB | Refills: 1 | Status: SHIPPED | OUTPATIENT
Start: 2018-10-09 | End: 2019-04-05 | Stop reason: SDUPTHER

## 2018-10-09 NOTE — TELEPHONE ENCOUNTER
From: Michael Guevara  To: Homar Voss MD  Sent: 10/8/2018 6:15 AM EDT  Subject: Medication Renewal Request    Original authorizing provider: MD Chilango Leslie. Zeeshan Soto would like a refill of the following medications:  clonazePAM (KLONOPIN) 0.5 mg tablet Homar Voss MD]    Preferred pharmacy: [facility] [address]    Comment:  Dr. Donna Fitch, I am down to my last seven doses. Can you send a script to [facility] for a 90 day supply.  Thank you. Mechelle Barkley

## 2018-10-09 NOTE — PROGRESS NOTES
PHYSICAL THERAPY - DAILY TREATMENT NOTE Patient Name: Marylou Guzman        Date: 10/9/2018 : 1955   YES Patient  Verified Visit #:   15   of   18  Insurance: Payor: Adrienne Newell / Plan: [id number] Patrice Jackson DEPENDENTS / Product Type: Alisa Hands / In time: 10:30 Out time: 11:15 Total Treatment Time: 45 Medicare Time Tracking (below) Total Timed Codes (min):  na 1:1 Treatment Time:  na  
 
TREATMENT AREA =  Low back pain [M54.5] SUBJECTIVE Pain Level (on 0 to 10 scale):  0-1  / 10 Medication Changes/New allergies or changes in medical history, any new surgeries or procedures? NO    If yes, update Summary List  
Subjective Functional Status/Changes:  []  No changes reported Just slight soreness OBJECTIVE 30 min Therapeutic Exercise:  [x]  See flow sheet Rationale:      increase ROM, increase strength, improve balance and increase proprioception to improve the patients ability to complete transfers, ADLs  
   
15 min Manual Therapy: T/S mob, L5 L ERS, DTM R Gm  
Rationale:      decrease pain, increase ROM, increase tissue extensibility and decrease trigger points to improve patient's ability to transfer 
 
 min Patient Education:  YES  Reviewed HEP []  Progressed/Changed HEP based on: Other Objective/Functional Measures: FOTO = 83 
GROC = +6 Post Treatment Pain Level (on 0 to 10) scale:   0  / 10 ASSESSMENT Assessment/Changes in Function:  
 
Good noe to all Rx without increase in pain  
  
[]  See Progress Note/Recertification Patient will continue to benefit from skilled PT services to modify and progress therapeutic interventions, address functional mobility deficits, address ROM deficits, address strength deficits, analyze and address soft tissue restrictions, analyze and cue movement patterns, analyze and modify body mechanics/ergonomics and assess and modify postural abnormalities to attain remaining goals. Progress toward goals / Updated goals: 
 
Progressing well with function PLAN 
[]  Upgrade activities as tolerated YES Continue plan of care  
[]  Discharge due to :   
[]  Other:   
 
Therapist: Azucena Dow, PT, OCS, SCS, CSCS Date: 10/9/2018 Time: 9:50 AM  
 
 
Future Appointments Date Time Provider Ame Alba 10/9/2018 10:30 AM Jaja Watters PT [facility]  
10/12/2018 1:00 PM Jaja Watters PT [facility]  
10/16/2018 1:00 PM Jaja Watters PT [facility]  
10/19/2018 1:30 PM Jaja Watters PT [facility]  
10/23/2018 12:00 PM Jaja Watters PT [facility]  
10/26/2018 1:00 PM Jaja Watters PT [facility]  
4/26/2019 1:15 PM Fabio Linn MD [address] [facility]

## 2018-10-12 ENCOUNTER — HOSPITAL ENCOUNTER (OUTPATIENT)
Dept: PHYSICAL THERAPY | Age: 63
Discharge: HOME | End: 2018-10-12
Payer: OTHER GOVERNMENT

## 2018-10-12 PROCEDURE — 97110 THERAPEUTIC EXERCISES: CPT

## 2018-10-12 PROCEDURE — 97140 MANUAL THERAPY 1/> REGIONS: CPT

## 2018-10-12 NOTE — PROGRESS NOTES
PHYSICAL THERAPY - DAILY TREATMENT NOTE Patient Name: Marna Burkitt        Date: 10/12/2018 : 1955   YES Patient  Verified Visit #:   15   of   18  Insurance: Payor:  / Plan: [employer] DEPENDENTS / Product Type: Andre Rash / In time: 12:55 Out time: 1:35 Total Treatment Time: 40 Medicare Time Tracking (below) Total Timed Codes (min):  na 1:1 Treatment Time:  na  
TREATMENT AREA =  Low back pain [M54.5] SUBJECTIVE Pain Level (on 0 to 10 scale):  0  / 10 Medication Changes/New allergies or changes in medical history, any new surgeries or procedures? NO    If yes, update Summary List  
Subjective Functional Status/Changes:  []  No changes reported I can do almost anything, but im still afraid its going to go out. I had one episode of very sharp pain for a split second and I was fine right after. OBJECTIVE 30 min Therapeutic Exercise:  [x]  See flow sheet Rationale:      increase ROM, increase strength, improve balance and increase proprioception to improve the patients ability to complete transfers, ADLs  
   
8 min Manual Therapy: T/S mob, L5 L FRS, shotgun tech Rationale:      decrease pain, increase ROM, increase tissue extensibility and decrease trigger points to improve patient's ability to transfer Dry Needling Procedure Note 
  
Dry Needle Session Number:  11 
  
Procedure: An intramuscular manual therapy (dry needling) and a neuro-muscular re-education treatment was done to deactivate myofascial trigger points, with a 15/30 gauge solid filament needle, under aseptic technique. 
  
Indication(s):                  [] Muscle spasms                [] Myalgia/Myositis            [] Muscle cramps                 
                                                               [] Muscle imbalances                   [] TMD (TMJ)                    [] Myofascial pain & dysfunction [] Other: __ 
  
Chart reviewed for the following: 
Rajani TRAMMELL PT, have reviewed the medical history, summary list and precautions/contraindications for [address] performed immediately prior to start of procedure: 
12:57 (enter time the timeout was completed) Rajani TRAMMELL PT, have performed the following reviews on Nayla Monique prior to the start of the session:     
[x] Patient was identified by name and date of birth   
[x] Agreement on all muscles being treated was verified  
[x] Purpose of dry needling, side effects, possible complications, risks and benefits were explained to the patient [x] Procedure site(s) verified 
[x] Patient was positioned for comfort and draped for privacy [x] Informed Consent was signed (initial visit) and verified verbally (subsequent visits) [x] Patient was instructed on the need to report the use of blood thinners and/or immunosuppressant medications [x] How to respond to possible adverse effects of treatment 
[x] Self treatment of post needling soreness: ice, heat (moist heat, heat wraps) and stretching 
[x] Opportunity was given to ask any questions, all questions were answered 
  
Treatment: The following muscles were treated today: 
  
Right: L/S para/multifidus, Gm Left: L/S para/multifidus  
   
  
Patients response to todays treatment:  
[x]  LTRs                                   [x]  Muscle Relaxation                                          []  Pain Relief                                         []  Decreased Tinnitus 
[]  Decreased HAs                     []  Post needling soreness            []  Increased ROM []  Other:    
 
 min Patient Education:  YES  Reviewed HEP []  Progressed/Changed HEP based on: Other Objective/Functional Measures: 
 
Decreased c/o LB tightness with RDL Post Treatment Pain Level (on 0 to 10) scale:   0  / 10 ASSESSMENT Assessment/Changes in Function:  
 
Good noe to all Rx without increase in pain  
  
[]  See Progress Note/Recertification Patient will continue to benefit from skilled PT services to modify and progress therapeutic interventions, address functional mobility deficits, address ROM deficits, address strength deficits, analyze and address soft tissue restrictions, analyze and cue movement patterns, analyze and modify body mechanics/ergonomics and assess and modify postural abnormalities to attain remaining goals. Progress toward goals / Updated goals: 
Slowly progressing with stability PLAN 
[]  Upgrade activities as tolerated YES Continue plan of care  
[]  Discharge due to :   
[]  Other:   
 
Therapist: Haritha De La Rosa, PT, OCS, SCS, CSCS Date: 10/12/2018 Time: 7:48 AM  
 
 
Future Appointments Date Time Provider Ame Alba 10/12/2018 1:00 PM Jigar Daily, PT [facility]  
10/16/2018 1:00 PM Jigar Daily, PT [facility]  
10/19/2018 1:30 PM Jigar Daily, PT [facility]  
10/23/2018 12:00 PM Jigar Daily, PT [facility]  
10/26/2018 1:00 PM Jigar Daily, PT [facility]  
4/26/2019 1:15 PM Ta Casarez MD [address] [facility]

## 2018-10-16 ENCOUNTER — HOSPITAL ENCOUNTER (OUTPATIENT)
Dept: PHYSICAL THERAPY | Age: 63
Discharge: HOME | End: 2018-10-16
Payer: OTHER GOVERNMENT

## 2018-10-16 PROCEDURE — 97140 MANUAL THERAPY 1/> REGIONS: CPT

## 2018-10-16 PROCEDURE — 97110 THERAPEUTIC EXERCISES: CPT

## 2018-10-16 NOTE — PROGRESS NOTES
PHYSICAL THERAPY - DAILY TREATMENT NOTE Patient Name: Abi Denise        Date: 10/16/2018 : 1955   YES Patient  Verified Visit #:     Insurance: Payor:  / Plan: Patrice Ibarra DEPENDENTS / Product Type: Durene Beans / In time: 1:00 Out time: 1:35 Total Treatment Time: 35 Medicare Time Tracking (below) Total Timed Codes (min):  na 1:1 Treatment Time:  na  
TREATMENT AREA =  Low back pain [M54.5] SUBJECTIVE Pain Level (on 0 to 10 scale):  0  / 10 Medication Changes/New allergies or changes in medical history, any new surgeries or procedures? NO    If yes, update Summary List  
Subjective Functional Status/Changes:  []  No changes reported Its been ok. Just feeling stiff OBJECTIVE 25 min Therapeutic Exercise:  [x]  See flow sheet Rationale:      increase ROM, increase strength, improve balance and increase proprioception to improve the patients ability to complete transfers, ADLs  
   
10 min Manual Therapy: T/S mob, IASTM para Rationale:      decrease pain, increase ROM, increase tissue extensibility and decrease trigger points to improve patient's ability to transfer 
 min Patient Education:  Damien Jones []  Progressed/Changed HEP based on: Other Objective/Functional Measures: 
 
Cont to c/o LB stiffness with RDL Post Treatment Pain Level (on 0 to 10) scale:   0  / 10 ASSESSMENT Assessment/Changes in Function:  
 
Good noe to all Rx without increase in pain  
  
[]  See Progress Note/Recertification Patient will continue to benefit from skilled PT services to modify and progress therapeutic interventions, address functional mobility deficits, address ROM deficits, address strength deficits, analyze and address soft tissue restrictions, analyze and cue movement patterns, analyze and modify body mechanics/ergonomics and assess and modify postural abnormalities to attain remaining goals. Progress toward goals / Updated goals: 
Slowly progressing with function PLAN 
[]  Upgrade activities as tolerated YES Continue plan of care  
[]  Discharge due to :   
[]  Other:   
 
Therapist: Jarred Hooper, PT, OCS, SCS, CSCS Date: 10/16/2018 Time: 9:59 AM  
 
 
Future Appointments Date Time Provider Ame Alba 10/16/2018 1:00 PM Becca Olivarez PT [address]  
10/19/2018 1:30 PM Becca Olivarez PT [place] [address]  
10/23/2018 12:00 PM Becca Olivarez PT [place] [address]  
10/26/2018 1:00 PM Becca Olivarez PT [place] [address]  
4/26/2019 1:15 PM Kenia Henderson MD [address] [facility]

## 2018-10-19 ENCOUNTER — HOSPITAL ENCOUNTER (OUTPATIENT)
Dept: PHYSICAL THERAPY | Age: 63
Discharge: HOME | End: 2018-10-19
Payer: OTHER GOVERNMENT

## 2018-10-19 PROCEDURE — 97140 MANUAL THERAPY 1/> REGIONS: CPT

## 2018-10-19 PROCEDURE — 97110 THERAPEUTIC EXERCISES: CPT

## 2018-10-19 NOTE — PROGRESS NOTES
PHYSICAL THERAPY - DAILY TREATMENT NOTE Patient Name: Michael Guevara        Date: 10/19/2018 : 1955   YES Patient  Verified Visit #:   15   of   24  Insurance: Payor: Uriah Marie / Plan: Clare Gutiérrez DEPENDENTS / Product Type: Coco Carrillo / In time: 1:30 Out time: 2:10 Total Treatment Time: 40 Medicare Time Tracking (below) Total Timed Codes (min):  na 1:1 Treatment Time:  na  
TREATMENT AREA =  Low back pain [M54.5] SUBJECTIVE Pain Level (on 0 to 10 scale):  0  / 10 Medication Changes/New allergies or changes in medical history, any new surgeries or procedures? NO    If yes, update Summary List  
Subjective Functional Status/Changes:  []  No changes reported Its been feeling good. Min occurrence of sharp pain OBJECTIVE 30 min Therapeutic Exercise:  [x]  See flow sheet Rationale:      increase ROM, increase strength, improve balance and increase proprioception to improve the patients ability to complete transfers, ADLs  
   
8 min Manual Therapy: T/S mob, shotgun tech Rationale:      decrease pain, increase ROM, increase tissue extensibility and decrease trigger points to improve patient's ability to transfer Dry Needling Procedure Note 
  
Dry Needle Session Number:  12 
  
Procedure: An intramuscular manual therapy (dry needling) and a neuro-muscular re-education treatment was done to deactivate myofascial trigger points, with a 15/30 gauge solid filament needle, under aseptic technique. 
  
Indication(s):                  [x] Muscle spasms                [x] Myalgia/Myositis            [] Muscle cramps                 
                                                               [] Muscle imbalances                   [] TMD (TMJ)                    [] Myofascial pain & dysfunction                                              [] Other: __ 
  
Chart reviewed for the following: Kiko Love, PT, have reviewed the medical history, summary list and precautions/contraindications for [address] performed immediately prior to start of procedure: 
1:32 (enter time the timeout was completed) Kinjal TRAMMELL, PT, have performed the following reviews on Rah Walton prior to the start of the session:     
[x] Patient was identified by name and date of birth   
[x] Agreement on all muscles being treated was verified  
[x] Purpose of dry needling, side effects, possible complications, risks and benefits were explained to the patient [x] Procedure site(s) verified [x] Patient was positioned for comfort and draped for privacy [x] Informed Consent was signed (initial visit) and verified verbally (subsequent visits) [x] Patient was instructed on the need to report the use of blood thinners and/or immunosuppressant medications [x] How to respond to possible adverse effects of treatment [x] Self treatment of post needling soreness: ice, heat (moist heat, heat wraps) and stretching 
[x] Opportunity was given to ask any questions, all questions were answered 
  
Treatment: The following muscles were treated today: 
  
Right: L/S para/multifidus, Gm Left: L/S para/multifidus  
   
  
Patients response to todays treatment:  
[x]  LTRs                                   [x]  Muscle Relaxation                                          []  Pain Relief                                         []  Decreased Tinnitus []  Decreased HAs                     []  Post needling soreness            []  Increased XNL                       
[]  Other:    
  
  
 min Patient Education:  YES  Reviewed HEP []  Progressed/Changed HEP based on: Other Objective/Functional Measures: FOTO = 72 Post Treatment Pain Level (on 0 to 10) scale:   0  / 10 ASSESSMENT Assessment/Changes in Function:  
 
Good noe to all Rx without increase in pain []  See Progress Note/Recertification Patient will continue to benefit from skilled PT services to modify and progress therapeutic interventions, address functional mobility deficits, address ROM deficits, address strength deficits, analyze and address soft tissue restrictions, analyze and cue movement patterns, analyze and modify body mechanics/ergonomics and assess and modify postural abnormalities to attain remaining goals. Progress toward goals / Updated goals: 
Progressing slowly with pain reduction PLAN 
[]  Upgrade activities as tolerated YES Continue plan of care  
[]  Discharge due to :   
[]  Other:   
 
Therapist: Charls Dakin, PT, OCS, SCS, CSCS Date: 10/19/2018 Time: 1:30 PM  
 
 
Future Appointments Date Time Provider Ame Alba 10/23/2018 12:00 PM Rosie Orozco PT [facility]  
10/26/2018  1:00 PM Rosie Orozco PT [facility]  
4/26/2019  1:15 PM Sherl Snellen, MD [address]

## 2018-10-19 NOTE — PROGRESS NOTES
[address] PHYSICAL THERAPY 
 [facility], [place] [address],[facility], [facility] - Phone: [phone]  Fax: [phone] PROGRESS NOTE Patient Name: Michael Guevara : 1955 Treatment/Medical Diagnosis: Low back pain [M54.5] Referral Source: Hamlet Davies MD    
Date of Initial Visit: 18 Attended Visits: 15 Missed Visits: 0  
SUMMARY OF TREATMENT Michael Guevara has been seen at our clinic 2-3x/wk for a total of 15 visits. Pt treatment has consisted of  therapeutic exercise for lumbar ROM, hip/core strengthening, and manual therapy(jt mobilization and deep tissue mobilization/dry needling) CURRENT STATUS Pt has had a good tolerance to physical therapy treatment. He reports improved ability to perform ADLs including lifting. He continues to complain of occasional slight pain at low back and stiffness. He also continues show some difficulty with engaging his gluteus jorge to perform lifting activities Goal/Measure of Progress Goal Met? 1. Decrease Max pain to </= 2/10 assist with ADLs Status at last Eval: 3-4/10 Current Status: 2/10 yes 2.  IImprove FOTO and additional 9 points to show signficant functional improvement. Status at last Eval: 56% Current Status: 72% yes 3. Will rate >/= +5 on Global Rating of Change and be prepared to DC to HEP. Status at last Eval: na Current Status: na n/a New Goals to be achieved in __4__  weeks: 1. Continue with goal #3 above 2.    
3.    
RECOMMENDATIONS Specifics: 2/wk x 4 more wksIf you have any questions/comments please contact us directly at [phone]. Thank you for allowing us to assist in the care of your patient. Therapist Signature: Jessica Herrera, AJAY, OCS, SCS, CSCS Date: 10/19/2018 Time: 1:46 PM  
NOTE TO PHYSICIAN:  PLEASE COMPLETE THE ORDERS BELOW AND [facility]X [facility]: [phone] If you are unable to process this request in 24 hours please contact our office: [phone] 
 
___ I have read the above report and request that my patient continue as recommended.  
___ I have read the above report and request that my patient continue therapy with the following changes/special instructions:_________________________________________________________  
___ I have read the above report and request that my patient be discharged from therapy.   
 
Physician Signature:       Date:      Time:

## 2018-10-23 ENCOUNTER — HOSPITAL ENCOUNTER (OUTPATIENT)
Dept: PHYSICAL THERAPY | Age: 63
Discharge: HOME | End: 2018-10-23
Payer: OTHER GOVERNMENT

## 2018-10-23 PROCEDURE — 97110 THERAPEUTIC EXERCISES: CPT

## 2018-10-23 PROCEDURE — 97140 MANUAL THERAPY 1/> REGIONS: CPT

## 2018-10-23 NOTE — PROGRESS NOTES
PHYSICAL THERAPY - DAILY TREATMENT NOTE Patient Name: Edison Saucedo        Date: 10/23/2018 : 1955   YES Patient  Verified Visit #:     Insurance: Payor: Jeanie To / Plan: [id number] DEPENDENTS / Product Type: Alessandro Casi / In time: 11:45 Out time: 12:18 Total Treatment Time: 35 Medicare Time Tracking (below) Total Timed Codes (min):  na 1:1 Treatment Time:  na  
TREATMENT AREA =  Low back pain [M54.5] SUBJECTIVE Pain Level (on 0 to 10 scale):  3-4  / 10 Medication Changes/New allergies or changes in medical history, any new surgeries or procedures? NO    If yes, update Summary List  
Subjective Functional Status/Changes:  []  No changes reported I was doing great until . I went to tie my shoes and my back went in to spasm. OBJECTIVE 23 min Therapeutic Exercise:  [x]  See flow sheet Rationale:      increase ROM, increase strength, improve balance and increase proprioception to improve the patients ability to complete transfers, ADLs  
   
8 min Manual Therapy: T/S mob, shotgun tech, L5 L FRS, L on L SI quita Rationale:      decrease pain, increase ROM, increase tissue extensibility and decrease trigger points to improve patient's ability to transfer Dry Needling Procedure Note 
  
Dry Needle Session Number:  05 
  
Procedure: An intramuscular manual therapy (dry needling) and a neuro-muscular re-education treatment was done to deactivate myofascial trigger points, with a 15/30 gauge solid filament needle, under aseptic technique. 
  
Indication(s):                  [x] Muscle spasms                [x] Myalgia/Myositis            [] Muscle cramps                 
                                                               [] Muscle imbalances                   [] TMD (TMJ)                    [] Myofascial pain & dysfunction                                              [] Other: __ 
  
Chart reviewed for the following: Kiko Love, PT, have reviewed the medical history, summary list and precautions/contraindications for [address] performed immediately prior to start of procedure: 
1:32 (enter time the timeout was completed) Kinjal TRAMMELL, PT, have performed the following reviews on Rah Walton prior to the start of the session:     
[x] Patient was identified by name and date of birth   
[x] Agreement on all muscles being treated was verified  
[x] Purpose of dry needling, side effects, possible complications, risks and benefits were explained to the patient [x] Procedure site(s) verified [x] Patient was positioned for comfort and draped for privacy [x] Informed Consent was signed (initial visit) and verified verbally (subsequent visits) [x] Patient was instructed on the need to report the use of blood thinners and/or immunosuppressant medications [x] How to respond to possible adverse effects of treatment [x] Self treatment of post needling soreness: ice, heat (moist heat, heat wraps) and stretching 
[x] Opportunity was given to ask any questions, all questions were answered 
  
Treatment: The following muscles were treated today: 
  
Right: L/S para/multifidus, Gm Left: L/S para/multifidus  
   
  
Patients response to todays treatment:  
[x]  LTRs                                   [x]  Muscle Relaxation                                          []  Pain Relief                                         []  Decreased Tinnitus []  Decreased HAs                     []  Post needling soreness            []  Increased DME                       
[]  Other:    
  
 
 
 min Patient Education:  Lillian Stacy []  Progressed/Changed HEP based on: Other Objective/Functional Measures: 
 
Sig increase in soft tissue tension noted at B para Post Treatment Pain Level (on 0 to 10) scale:   2  / 10 ASSESSMENT Assessment/Changes in Function: Good noe to all Rx without increase in pain  
  
[]  See Progress Note/Recertification Patient will continue to benefit from skilled PT services to modify and progress therapeutic interventions, address functional mobility deficits, address ROM deficits, address strength deficits, analyze and address soft tissue restrictions, analyze and cue movement patterns, analyze and modify body mechanics/ergonomics and assess and modify postural abnormalities to attain remaining goals. Progress toward goals / Updated goals: 
Slow progress with pain reduction PLAN 
[]  Upgrade activities as tolerated YES Continue plan of care  
[]  Discharge due to :   
[]  Other:   
 
Therapist: Adrien Mccullough, PT, OCS, SCS, CSCS Date: 10/23/2018 Time: 10:00 AM  
 
 
Future Appointments Date Time Provider Ame Alba 10/23/2018 12:00 PM Rajesh Navarrete PT [facility]  
10/26/2018  1:00 PM Rajesh Navarrete PT [facility]  
4/26/2019  1:15 PM Mariella Noguera MD [address] [facility]

## 2018-10-25 ENCOUNTER — OFFICE VISIT (OUTPATIENT)
Dept: FAMILY MEDICINE CLINIC | Age: 63
End: 2018-10-25

## 2018-10-25 VITALS
SYSTOLIC BLOOD PRESSURE: 128 MMHG | HEART RATE: 71 BPM | BODY MASS INDEX: 28.34 KG/M2 | DIASTOLIC BLOOD PRESSURE: 84 MMHG | HEIGHT: 77 IN | TEMPERATURE: 98.5 F | WEIGHT: 240 LBS | OXYGEN SATURATION: 97 % | RESPIRATION RATE: 14 BRPM

## 2018-10-25 DIAGNOSIS — J30.0 VASOMOTOR RHINITIS: ICD-10-CM

## 2018-10-25 DIAGNOSIS — M54.16 LUMBAR RADICULAR PAIN: ICD-10-CM

## 2018-10-25 DIAGNOSIS — J20.8 VIRAL BRONCHITIS: Primary | ICD-10-CM

## 2018-10-25 RX ORDER — FLUTICASONE PROPIONATE 50 MCG
SPRAY, SUSPENSION (ML) NASAL
Qty: 48 G | Refills: 2 | Status: SHIPPED | OUTPATIENT
Start: 2018-10-25

## 2018-10-25 RX ORDER — CODEINE PHOSPHATE AND GUAIFENESIN 10; 100 MG/5ML; MG/5ML
5 SOLUTION ORAL
Qty: 180 ML | Refills: 0 | Status: SHIPPED | OUTPATIENT
Start: 2018-10-25 | End: 2018-11-30 | Stop reason: ALTCHOICE

## 2018-10-25 RX ORDER — IPRATROPIUM BROMIDE 42 UG/1
2 SPRAY, METERED NASAL
Qty: 45 ML | Refills: 1 | Status: SHIPPED | OUTPATIENT
Start: 2018-10-25

## 2018-10-25 RX ORDER — HYDROCODONE BITARTRATE AND ACETAMINOPHEN 5; 325 MG/1; MG/1
1 TABLET ORAL
Qty: 20 TAB | Refills: 0 | Status: SHIPPED | OUTPATIENT
Start: 2018-10-25 | End: 2019-04-12 | Stop reason: ALTCHOICE

## 2018-10-25 NOTE — PROGRESS NOTES
Vania King is a 58 y.o. male (: 1955) presenting to address:   Patient has already received the flu vaccine. Chief Complaint   Patient presents with    Cold Symptoms       Vitals:    10/25/18 1112   BP: 128/84   Pulse: 71   Resp: 14   Temp: 98.5 °F (36.9 °C)   TempSrc: Oral   SpO2: 97%   Weight: 240 lb (108.9 kg)   Height: 6' 5\" (1.956 m)   PainSc:   1   PainLoc: Back       Hearing/Vision:   No exam data present    Learning Assessment:     Learning Assessment 2015   PRIMARY LEARNER Patient   HIGHEST LEVEL OF EDUCATION - PRIMARY LEARNER  SOME COLLEGE   PRIMARY LANGUAGE ENGLISH   LEARNER PREFERENCE PRIMARY LISTENING   ANSWERED BY patient   RELATIONSHIP SELF     Depression Screening:     PHQ over the last two weeks 2018   Little interest or pleasure in doing things Not at all   Feeling down, depressed, irritable, or hopeless Not at all   Total Score PHQ 2 0     Fall Risk Assessment:     Fall Risk Assessment, last 12 mths 2017   Able to walk? Yes   Fall in past 12 months? No     Abuse Screening:     Abuse Screening Questionnaire 2017   Do you ever feel afraid of your partner? N   Are you in a relationship with someone who physically or mentally threatens you? N   Is it safe for you to go home? Y     Coordination of Care Questionaire:   1. Have you been to the ER, urgent care clinic since your last visit? Hospitalized since your last visit? NO    2. Have you seen or consulted any other health care providers outside of the [address] [place] [address] since your last visit? Include any pap smears or colon screening. NO    Advanced Directive:   1. Do you have an Advanced Directive? YES    2. Would you like information on Advanced Directives?  NO

## 2018-10-25 NOTE — PROGRESS NOTES
Chief Complaint   Patient presents with    Cold Symptoms     Assessment/Plan  1. Viral bronchitis  -no indication for antibiotics. Robitussin codeine at night. Hold flexeril while taking this med. Also discussed over sedation with concomitant use with benzos. - guaiFENesin-codeine (ROBITUSSIN AC) 100-10 mg/5 mL solution; Take 5 mL by mouth nightly as needed for Cough. Max Daily Amount: 5 mL. Dispense: 180 mL; Refill: 0    2. Vasomotor rhinitis  -refilled  - ipratropium (ATROVENT) 42 mcg (0.06 %) nasal spray; 2 Sprays by Both Nostrils route four (4) times daily as needed for Rhinitis. Dispense: 45 mL; Refill: 1    3. Lumbar radicular pain  -dry needling prn  -norco prn    The plan was discussed with the patient. The patient verbalized understanding and is in agreement with the plan. All medication potential side effects were discussed with the patient. SUBJECTIVE:   Kasia William is a 58 y.o. male who complains of 6 day h/o productive cough (white). Worse at night. No f/c.  +congestion. Review of Systems - ENT ROS: positive for - headaches, nasal congestion and vocal changes  Respiratory ROS: positive for - cough  Cardiovascular ROS: no chest pain or dyspnea on exertion  Gastrointestinal ROS: no abdominal pain, change in bowel habits, or black or bloody stools  Musculoskeletal ROS: negative  Neurological ROS: negative    Physical Examination:   Visit Vitals  /84 (BP 1 Location: Right arm, BP Patient Position: Sitting)   Pulse 71   Temp 98.5 °F (36.9 °C) (Oral)   Resp 14   Ht 6' 5\" (1.956 m)   Wt 240 lb (108.9 kg)   SpO2 97%   BMI 28.46 kg/m²       Constitutional: Well developed, nourished, no distress, alert   HENT: Exterior ears and tympanic membranes normal bilaterally. Supple neck. No thyromegaly or lymphadenopathy. Oropharynx clear and moist mucous membranes. Eyes: Conjunctiva normal. PERRL. CV: S1, S2.  RRR. No murmurs/rubs. No thrills palpated. No carotid bruits.   Intact distal pulses. No edema. Pulm: No abnormalities on inspection. Clear to auscultation bilaterally. No wheezing/rhonchi. Normal effort.              Nicholos Babinski, MD

## 2018-10-26 ENCOUNTER — HOSPITAL ENCOUNTER (OUTPATIENT)
Dept: PHYSICAL THERAPY | Age: 63
Discharge: HOME | End: 2018-10-26
Payer: OTHER GOVERNMENT

## 2018-10-26 PROCEDURE — 97140 MANUAL THERAPY 1/> REGIONS: CPT

## 2018-10-26 PROCEDURE — 97110 THERAPEUTIC EXERCISES: CPT

## 2018-10-26 NOTE — PROGRESS NOTES
PHYSICAL THERAPY - DAILY TREATMENT NOTE Patient Name: Linda Uriostegui        Date: 10/26/2018 : 1955   YES Patient  Verified Visit #:     Insurance: Payor: Francisco Miranda / Plan: Griselda Carter DEPENDENTS / Product Type: Nano Figueroa / In time: 1:00 Out time: 1:40 Total Treatment Time: 40 Medicare Time Tracking (below) Total Timed Codes (min):  na 1:1 Treatment Time:  na  
TREATMENT AREA =  Low back pain [M54.5] SUBJECTIVE Pain Level (on 0 to 10 scale):  0  / 10 Medication Changes/New allergies or changes in medical history, any new surgeries or procedures? NO    If yes, update Summary List  
Subjective Functional Status/Changes:  []  No changes reported Just slight tightness, but it feels good. Almost feel normal  
 
  
 
25 min Therapeutic Exercise:  [x]  See flow sheet Rationale:      increase ROM, increase strength, improve balance and increase proprioception to improve the patients ability to complete transfers, ADLs  
   
15 min Manual Therapy: T/S mob, IASTM para, shotgun tech, , L on L SI quita Rationale:      decrease pain, increase ROM, increase tissue extensibility and decrease trigger points to improve patient's ability to transfer 
 
 
 min Patient Education:  Kalia Belt []  Progressed/Changed HEP based on: Other Objective/Functional Measures: 
 
Cont to have increased soft tissue tension noted at L/S para Post Treatment Pain Level (on 0 to 10) scale:   0  / 10 ASSESSMENT Assessment/Changes in Function:  
 
Good noe to all Rx without increase in pain  
  
[]  See Progress Note/Recertification Patient will continue to benefit from skilled PT services to modify and progress therapeutic interventions, address functional mobility deficits, address ROM deficits, address strength deficits, analyze and address soft tissue restrictions, analyze and cue movement patterns, analyze and modify body mechanics/ergonomics and assess and modify postural abnormalities to attain remaining goals. Progress toward goals / Updated goals: 
Progressing slowly with pain reduction PLAN 
[]  Upgrade activities as tolerated YES Continue plan of care  
[]  Discharge due to :   
[]  Other:   
 
Therapist: Rich Solorzano, PT, OCS, SCS, CSCS Date: 10/26/2018 Time: 6:33 AM  
 
 
Future Appointments Date Time Provider Ame Anjali 10/26/2018  1:00 PM Debarah Hock, PT [facility]  
10/30/2018  3:00 PM Debarah Hock, PT [facility]  
11/6/2018  4:30 PM Debarah Hock, PT [facility]  
11/9/2018  1:00 PM Debarah Hock, PT [facility]  
11/12/2018  9:00 AM Debarah Hock, PT [facility]  
11/16/2018  1:30 PM Debarah Hock, PT [facility]  
11/20/2018  4:00 PM Debarah Hock, PT [facility]  
11/23/2018  8:30 AM Debarah Hock, PT [facility]  
11/27/2018  4:00 PM Debarah Hock, PT [facility]  
11/30/2018  1:00 PM Debarah Hock, PT [facility]  
4/26/2019  1:15 PM Karo Coats MD [address] [facility]

## 2018-10-30 ENCOUNTER — HOSPITAL ENCOUNTER (OUTPATIENT)
Dept: PHYSICAL THERAPY | Age: 63
Discharge: HOME | End: 2018-10-30
Payer: OTHER GOVERNMENT

## 2018-10-30 PROCEDURE — 97140 MANUAL THERAPY 1/> REGIONS: CPT

## 2018-10-30 PROCEDURE — 97110 THERAPEUTIC EXERCISES: CPT

## 2018-10-30 NOTE — PROGRESS NOTES
PHYSICAL THERAPY - DAILY TREATMENT NOTE Patient Name: Emmanuelle Vargas        Date: 10/30/2018 : 1955   YES Patient  Verified Visit #:     Insurance: Payor: Vermatilda Peel / Plan: Chase Clark DEPENDENTS / Product Type: Victorino Hylan / In time: 2:55 Out time: 3:48 Total Treatment Time: 48 Medicare Time Tracking (below) Total Timed Codes (min):  na 1:1 Treatment Time:  na  
TREATMENT AREA =  Lower back pain [M54.5] SUBJECTIVE Pain Level (on 0 to 10 scale):  0  / 10 Medication Changes/New allergies or changes in medical history, any new surgeries or procedures? NO    If yes, update Summary List  
Subjective Functional Status/Changes:  []  No changes reported Its been great. Had one incidence that my LB felt a little funny while I was washing dishes, but other than that its been good. OBJECTIVE 40 min Therapeutic Exercise:  [x]  See flow sheet Rationale:      increase ROM, increase strength, improve balance and increase proprioception to improve the patients ability to complete transfers, ADLs  
   
10 min Manual Therapy: T/S mob, shotgun tech, L5 L FRS, L on L SI quita Rationale:      decrease pain, increase ROM, increase tissue extensibility and decrease trigger points to improve patient's ability to transfer Dry Needling Procedure Note 
  
Dry Needle Session Number:  96  
Procedure: An intramuscular manual therapy (dry needling) and a neuro-muscular re-education treatment was done to deactivate myofascial trigger points, with a 15/30 gauge solid filament needle, under aseptic technique. 
  
Indication(s):                  [x] Muscle spasms                [x] Myalgia/Myositis            [] Muscle cramps                 
                                                               [] Muscle imbalances                   [] TMD (TMJ)                    [] Myofascial pain & dysfunction                                              [] Other: __ 
  
 Chart reviewed for the following: 
I, Reford Sandifer, PT, have reviewed the medical history, summary list and precautions/contraindications for [address] performed immediately prior to start of procedure: 
2:58 (enter time the timeout was completed) I, Reford Sandifer, PT, have performed the following reviews on Coulter Hermes Walton prior to the start of the session:     
[x] Patient was identified by name and date of birth   
[x] Agreement on all muscles being treated was verified  
[x] Purpose of dry needling, side effects, possible complications, risks and benefits were explained to the patient [x] Procedure site(s) verified [x] Patient was positioned for comfort and draped for privacy [x] Informed Consent was signed (initial visit) and verified verbally (subsequent visits) [x] Patient was instructed on the need to report the use of blood thinners and/or immunosuppressant medications [x] How to respond to possible adverse effects of treatment [x] Self treatment of post needling soreness: ice, heat (moist heat, heat wraps) and stretching 
[x] Opportunity was given to ask any questions, all questions were answered 
  
Treatment: The following muscles were treated today: 
  
Right: L/S para/multifidus, Gm, TFL Left: L/S para/multifidus, Gm, TFL  
   
  
Patients response to todays treatment:  
[x]  LTRs                                   [x]  Muscle Relaxation                                          []  Pain Relief                                         []  Decreased Tinnitus []  Decreased HAs                     []  Post needling soreness            []  Increased VYU                       
[]  Other:    
  
 
 min Patient Education:  Lizeth Loop []  Progressed/Changed HEP based on: Other Objective/Functional Measures: 
 
Sig increase in soft tissue tension noted at B TFL Limited ER ROM noted on L Post Treatment Pain Level (on 0 to 10) scale:   0  / 10 ASSESSMENT Assessment/Changes in Function:  
 
Good noe to all Rx without increase in pain  
  
[]  See Progress Note/Recertification Patient will continue to benefit from skilled PT services to modify and progress therapeutic interventions, address functional mobility deficits, address ROM deficits, address strength deficits, analyze and address soft tissue restrictions, analyze and cue movement patterns, analyze and modify body mechanics/ergonomics and assess and modify postural abnormalities to attain remaining goals. Progress toward goals / Updated goals: 
Slowly progressing with pain reduction PLAN 
[]  Upgrade activities as tolerated YES Continue plan of care  
[]  Discharge due to :   
[]  Other:   
 
Therapist: Azucena Dow, PT, OCS, SCS, CSCS Date: 10/30/2018 Time: 9:55 AM  
 
 
Future Appointments Date Time Provider Ame Alba 10/30/2018  3:00 PM Misa Izaguirre PT [facility]  
11/6/2018  4:30 PM Misa Izaguirre PT [facility]  
11/9/2018  1:00 PM Misa Izaguirre PT [facility]  
11/12/2018  9:00 AM Misa Izaguirre PT [facility]  
11/16/2018  1:30 PM Misa Izaguirre PT [facility]  
11/20/2018  4:00 PM Misa Izaguirre PT [facility]  
11/23/2018  8:30 AM Misa Izaguirre PT [facility]  
11/27/2018  4:00 PM Misa Izaguirre PT [facility]  
11/30/2018  1:00 PM Misa Izaguirre PT [facility]  
4/26/2019  1:15 PM MD Alexander Gar

## 2018-11-06 ENCOUNTER — HOSPITAL ENCOUNTER (OUTPATIENT)
Dept: PHYSICAL THERAPY | Age: 63
Discharge: HOME | End: 2018-11-06
Payer: OTHER GOVERNMENT

## 2018-11-06 PROCEDURE — 97110 THERAPEUTIC EXERCISES: CPT

## 2018-11-06 PROCEDURE — 97140 MANUAL THERAPY 1/> REGIONS: CPT

## 2018-11-06 NOTE — PROGRESS NOTES
PHYSICAL THERAPY - DAILY TREATMENT NOTE Patient Name: Abi Denise        Date: 2018 : 1955   YES Patient  Verified Visit #:     Insurance: Payor: Kirke Mortimer / Plan: Sai Philip DEPENDENTS / Product Type: Durene Beans / In time: 4:25 Out time: 5:05 Total Treatment Time: 40 Medicare Time Tracking (below) Total Timed Codes (min):  na 1:1 Treatment Time:  na  
TREATMENT AREA =  Lower back pain [M54.5] SUBJECTIVE Pain Level (on 0 to 10 scale):  0  / 10 Medication Changes/New allergies or changes in medical history, any new surgeries or procedures? NO    If yes, update Summary List  
Subjective Functional Status/Changes:  []  No changes reported I havent had any issue what soever after the last visit. OBJECTIVE 30 min Therapeutic Exercise:  [x]  See flow sheet Rationale:      increase ROM, increase strength, improve balance and increase proprioception to improve the patients ability to complete transfers, ADLs  
   
10 min Manual Therapy: T/S mob, shotgun tech, L5 L ERS, IASTM para Rationale:      decrease pain, increase ROM, increase tissue extensibility and decrease trigger points to improve patient's ability to transfer 
 
 min Patient Education:  Damien Jones []  Progressed/Changed HEP based on: Other Objective/Functional Measures: 
 
Sig decrease in soft tissue tension noted at para today Post Treatment Pain Level (on 0 to 10) scale:   0  / 10 ASSESSMENT Assessment/Changes in Function:  
 
Good noe to all Rx without increase in pain  
  
[]  See Progress Note/Recertification Patient will continue to benefit from skilled PT services to modify and progress therapeutic interventions, address functional mobility deficits, address ROM deficits, address strength deficits, analyze and address soft tissue restrictions, analyze and cue movement patterns, analyze and modify body mechanics/ergonomics and assess and modify postural abnormalities to attain remaining goals. Progress toward goals / Updated goals: 
Progressing well with pain reduction PLAN 
[]  Upgrade activities as tolerated YES Continue plan of care  
[]  Discharge due to :   
[]  Other:   
 
Therapist: Randy Clarke, PT, OCS, SCS, CSCS Date: 11/6/2018 Time: 10:02 AM  
 
 
Future Appointments Date Time Provider Ame Anjali 11/6/2018  4:30 PM Lenin Irene PT [facility]  
11/9/2018  1:00 PM Lenin Irene, PT [facility]  
11/12/2018  9:00 AM Lenin Irene, PT [facility]  
11/16/2018  1:30 PM Lenin Irene, PT [facility]  
11/20/2018  4:00 PM Lenin Irene, PT [facility]  
11/23/2018  8:30 AM Lenin Irene PT [facility]  
11/27/2018  4:00 PM Lenin Irene, PT [facility]  
11/30/2018  1:00 PM Lenin Irene, PT [facility]  
4/26/2019  1:15 PM Alfred Summers MD [id number] [facility]

## 2018-11-09 ENCOUNTER — HOSPITAL ENCOUNTER (OUTPATIENT)
Dept: PHYSICAL THERAPY | Age: 63
Discharge: HOME | End: 2018-11-09
Payer: OTHER GOVERNMENT

## 2018-11-09 PROCEDURE — 97140 MANUAL THERAPY 1/> REGIONS: CPT

## 2018-11-09 PROCEDURE — 97110 THERAPEUTIC EXERCISES: CPT

## 2018-11-09 NOTE — PROGRESS NOTES
PHYSICAL THERAPY - DAILY TREATMENT NOTE Patient Name: Vania King        Date: 2018 : 1955   YES Patient  Verified Visit #:     Insurance: Payor: Kathy Garrison / Plan: Jose Francisco Correa DEPENDENTS / Product Type: Bruce Imus / In time: 1:00 Out time: 1:40 Total Treatment Time: 40 Medicare Time Tracking (below) Total Timed Codes (min):  na 1:1 Treatment Time:  na  
TREATMENT AREA =  Lower back pain [M54.5] SUBJECTIVE Pain Level (on 0 to 10 scale):  0  / 10 Medication Changes/New allergies or changes in medical history, any new surgeries or procedures? NO    If yes, update Summary List  
Subjective Functional Status/Changes:  []  No changes reported Doing good. NO pain OBJECTIVE 30 min Therapeutic Exercise:  [x]  See flow sheet Rationale:      increase ROM, increase strength, improve balance and increase proprioception to improve the patients ability to complete transfers, ADLs  
   
8 min Manual Therapy: T/S mob, L5 L ERS uqita Rationale:      decrease pain, increase ROM, increase tissue extensibility and decrease trigger points to improve patient's ability to transfer Dry Needling Procedure Note 
  
Dry Needle Session Number:  59  
Procedure: An intramuscular manual therapy (dry needling) and a neuro-muscular re-education treatment was done to deactivate myofascial trigger points, with a 15/30 gauge solid filament needle, under aseptic technique. 
  
Indication(s):                  [x] Muscle spasms                [x] Myalgia/Myositis            [] Muscle cramps                 
                                                               [] Muscle imbalances                   [] TMD (TMJ)                    [] Myofascial pain & dysfunction                                              [] Other: __ 
  
Chart reviewed for the following: 
Idalia TRAMMELL PT, have reviewed the medical history, summary list and precautions/contraindications for [address] performed immediately prior to start of procedure: 
1:02 (enter time the timeout was completed) ITaylor PT, have performed the following reviews on Romel Walton prior to the start of the session:     
[x] Patient was identified by name and date of birth   
[x] Agreement on all muscles being treated was verified  
[x] Purpose of dry needling, side effects, possible complications, risks and benefits were explained to the patient [x] Procedure site(s) verified [x] Patient was positioned for comfort and draped for privacy [x] Informed Consent was signed (initial visit) and verified verbally (subsequent visits) [x] Patient was instructed on the need to report the use of blood thinners and/or immunosuppressant medications [x] How to respond to possible adverse effects of treatment [x] Self treatment of post needling soreness: ice, heat (moist heat, heat wraps) and stretching 
[x] Opportunity was given to ask any questions, all questions were answered 
  
Treatment: The following muscles were treated today: 
  
Right: L/S para/multifidus, Left: L/S para/multifidus, QL  
   
  
Patients response to todays treatment:  
[x]  LTRs                                   [x]  Muscle Relaxation                                          []  Pain Relief                                         []  Decreased Tinnitus []  Decreased HAs                     []  Post needling soreness            []  Increased YPP                       
[]  Other:    
  
 
 min Patient Education:  [place] Manual []  Progressed/Changed HEP based on: Other Objective/Functional Measures: 
 
Cont to have increased soft tissue tension noted at L para/QL Post Treatment Pain Level (on 0 to 10) scale:   0  / 10 ASSESSMENT Assessment/Changes in Function:  
 
Good noe to all Rx without increase in pain  
  
[]  See Progress Note/Recertification Patient will continue to benefit from skilled PT services to modify and progress therapeutic interventions, address functional mobility deficits, address ROM deficits, address strength deficits, analyze and address soft tissue restrictions, analyze and cue movement patterns, analyze and modify body mechanics/ergonomics and assess and modify postural abnormalities to attain remaining goals. Progress toward goals / Updated goals: 
Slowly progressing with pain reduction PLAN 
[]  Upgrade activities as tolerated YES Continue plan of care  
[]  Discharge due to :   
[]  Other:   
 
Therapist: Loco Stephens, PT, OCS, SCS, CSCS Date: 11/9/2018 Time: 6:33 AM  
 
 
Future Appointments Date Time Provider Ame Alba 11/9/2018  1:00 PM Cecily Duran PT [facility]  
11/12/2018  9:00 AM Cecily Duran PT [facility]  
11/16/2018  1:30 PM Cecily Duran PT [facility]  
11/20/2018  4:00 PM Cecily Duran PT [facility]  
11/23/2018  8:30 AM Cecily Duran PT [facility]  
11/27/2018  4:00 PM Cecily Duran PT [facility]  
11/30/2018  1:00 PM Cecily Duran, PT [facility]  
4/26/2019  1:15 PM Makayla Desai MD [address] [facility]

## 2018-11-12 ENCOUNTER — HOSPITAL ENCOUNTER (OUTPATIENT)
Dept: PHYSICAL THERAPY | Age: 63
Discharge: HOME | End: 2018-11-12
Payer: OTHER GOVERNMENT

## 2018-11-12 PROCEDURE — 97110 THERAPEUTIC EXERCISES: CPT

## 2018-11-12 PROCEDURE — 97140 MANUAL THERAPY 1/> REGIONS: CPT

## 2018-11-12 NOTE — PROGRESS NOTES
PHYSICAL THERAPY - DAILY TREATMENT NOTE Patient Name: Meeta Andrews        Date: 2018 : 1955   YES Patient  Verified Visit #:     Insurance: Payor: Cedillo Inch / Plan: NI Patrice Jackson DEPENDENTS / Product Type: Cayla Cousin / In time: 8:58 Out time: 9:35 Total Treatment Time: 37 Medicare Time Tracking (below) Total Timed Codes (min):  na 1:1 Treatment Time:  na  
TREATMENT AREA =  Lower back pain [M54.5] SUBJECTIVE Pain Level (on 0 to 10 scale):  0  / 10 Medication Changes/New allergies or changes in medical history, any new surgeries or procedures? NO    If yes, update Summary List  
Subjective Functional Status/Changes:  []  No changes reported It gets tight here and there but its been good OBJECTIVE 29 min Therapeutic Exercise:  [x]  See flow sheet Rationale:      increase ROM, increase strength, improve balance and increase proprioception to improve the patients ability to complete transfers, ADLs  
   
8 min Manual Therapy: T/S mob, L5 PA mob Rationale:      decrease pain, increase ROM, increase tissue extensibility and decrease trigger points to improve patient's ability to transfer 
 
 
 min Patient Education:  [place] Scale []  Progressed/Changed HEP based on: Other Objective/Functional Measures: 
 
Good form with RDL noted today Post Treatment Pain Level (on 0 to 10) scale:   0  / 10 ASSESSMENT Assessment/Changes in Function:  
 
Good noe to all Rx without increase in pain  
  
[]  See Progress Note/Recertification Patient will continue to benefit from skilled PT services to modify and progress therapeutic interventions, address functional mobility deficits, address ROM deficits, address strength deficits, analyze and address soft tissue restrictions, analyze and cue movement patterns, analyze and modify body mechanics/ergonomics and assess and modify postural abnormalities to attain remaining goals. Progress toward goals / Updated goals: 
Progressing slowly with pain redcution PLAN 
[]  Upgrade activities as tolerated YES Continue plan of care  
[]  Discharge due to :   
[]  Other:   
 
Therapist: Haydee Wright, PT, OCS, SCS, CSCS Date: 11/12/2018 Time: 6:29 AM  
 
 
Future Appointments Date Time Provider Ame Alba 11/12/2018  9:00 AM Stiven Mena PT [facility]  
11/16/2018  1:30 PM Stiven Mena PT [facility]  
11/20/2018  4:00 PM Stiven Mena PT [facility]  
11/23/2018  8:30 AM Stiven Mena PT [facility]  
11/27/2018  4:00 PM Stiven Mena PT [facility]  
11/30/2018  1:00 PM Stiven Mena PT [facility]  
4/26/2019  1:15 PM Luc Wray MD [address] [facility]

## 2018-11-16 ENCOUNTER — HOSPITAL ENCOUNTER (OUTPATIENT)
Dept: PHYSICAL THERAPY | Age: 63
Discharge: HOME | End: 2018-11-16
Payer: OTHER GOVERNMENT

## 2018-11-16 PROCEDURE — 97110 THERAPEUTIC EXERCISES: CPT

## 2018-11-16 PROCEDURE — 97140 MANUAL THERAPY 1/> REGIONS: CPT

## 2018-11-16 NOTE — PROGRESS NOTES
PHYSICAL THERAPY - DAILY TREATMENT NOTE Patient Name: Sushma Fisher        Date: 2018 : 1955   YES Patient  Verified Visit #:     Insurance: Payor: Shelia Sal / Plan: Vandana Christie DEPENDENTS / Product Type: Dave Pond / In time: 1:28 Out time: 2:03 Total Treatment Time: 35 Medicare Time Tracking (below) Total Timed Codes (min):  na 1:1 Treatment Time:  na  
TREATMENT AREA =  Lower back pain [M54.5] SUBJECTIVE Pain Level (on 0 to 10 scale):  0  / 10 Medication Changes/New allergies or changes in medical history, any new surgeries or procedures? NO    If yes, update Summary List  
Subjective Functional Status/Changes:  []  No changes reported Its been good OBJECTIVE 25 min Therapeutic Exercise:  [x]  See flow sheet Rationale:      increase ROM, increase strength, improve balance and increase proprioception to improve the patients ability to complete transfers, ADLs  
   
8 min Manual Therapy: T/S mob, L5 PA mob Rationale:      decrease pain, increase ROM, increase tissue extensibility and decrease trigger points to improve patient's ability to transfer Dry Needling Procedure Note 
  
Dry Needle Session Number:  77 Procedure: An intramuscular manual therapy (dry needling) and a neuro-muscular re-education treatment was done to deactivate myofascial trigger points, with a 15/30 gauge solid filament needle, under aseptic technique. 
  
Indication(s):                  [x] Muscle spasms                [x] Myalgia/Myositis            [] Muscle cramps                 
                                                               [] Muscle imbalances                   [] TMD (TMJ)                    [] Myofascial pain & dysfunction                                              [] Other: __ 
  
Chart reviewed for the following: 
ILoren PT, have reviewed the medical history, summary list and precautions/contraindications for [address] performed immediately prior to start of procedure: 
1:30 (enter time the timeout was completed) Jigar TRAMMELL, PT, have performed the following reviews on Isaias Walton prior to the start of the session:     
[x] Patient was identified by name and date of birth   
[x] Agreement on all muscles being treated was verified  
[x] Purpose of dry needling, side effects, possible complications, risks and benefits were explained to the patient [x] Procedure site(s) verified [x] Patient was positioned for comfort and draped for privacy [x] Informed Consent was signed (initial visit) and verified verbally (subsequent visits) [x] Patient was instructed on the need to report the use of blood thinners and/or immunosuppressant medications [x] How to respond to possible adverse effects of treatment [x] Self treatment of post needling soreness: ice, heat (moist heat, heat wraps) and stretching 
[x] Opportunity was given to ask any questions, all questions were answered 
  
Treatment: The following muscles were treated today: 
  
Right: L/S para/multifidus, Left: L/S para/multifidus, QL  
   
  
Patients response to todays treatment:  
[x]  LTRs                                   [x]  Muscle Relaxation                                          []  Pain Relief                                         []  Decreased Tinnitus []  Decreased HAs                     []  Post needling soreness            []  Increased BBE                       
[]  Other:    
 min Patient Education:  Jaciel Lees []  Progressed/Changed HEP based on: Other Objective/Functional Measures: 
 
Able to maintain neutral spine with new ex Post Treatment Pain Level (on 0 to 10) scale:   0  / 10 ASSESSMENT Assessment/Changes in Function:  
 
Good noe to all Rx without increase in pain  
  
[]  See Progress Note/Recertification Patient will continue to benefit from skilled PT services to modify and progress therapeutic interventions, address functional mobility deficits, address ROM deficits, address strength deficits, analyze and address soft tissue restrictions, analyze and cue movement patterns, analyze and modify body mechanics/ergonomics and assess and modify postural abnormalities to attain remaining goals. Progress toward goals / Updated goals: 
Progressing wel with stability PLAN 
[]  Upgrade activities as tolerated YES Continue plan of care  
[]  Discharge due to :   
[]  Other:   
 
Therapist: Jason Sanders, PT, OCS, SCS, CSCS Date: 11/16/2018 Time: 1:29 PM  
 
 
Future Appointments Date Time Provider Ame Anjali 11/16/2018  1:30 PM Marcelino Garcia, PT [facility]  
11/20/2018  4:00 PM Marcelino Garcia, PT [facility]  
11/23/2018  8:30 AM Marcelino Garcia, PT [facility]  
11/27/2018  4:00 PM Marcelino Garcia, PT [facility]  
11/30/2018  7:45 AM Alena Tellez MD [facility]  
11/30/2018  1:00 PM Marcelino Garcia, PT [facility]  
4/26/2019  1:15 PM Chad Horn MD [address] [facility]

## 2018-11-20 ENCOUNTER — HOSPITAL ENCOUNTER (OUTPATIENT)
Dept: PHYSICAL THERAPY | Age: 63
Discharge: HOME | End: 2018-11-20
Payer: OTHER GOVERNMENT

## 2018-11-20 PROCEDURE — 97140 MANUAL THERAPY 1/> REGIONS: CPT

## 2018-11-20 PROCEDURE — 97110 THERAPEUTIC EXERCISES: CPT

## 2018-11-20 NOTE — PROGRESS NOTES
PHYSICAL THERAPY - DAILY TREATMENT NOTE Patient Name: Brett Blanca        Date: 2018 : 1955   YES Patient  Verified Visit #:     Insurance: Payor: Mariano Hernandez / Plan: Abbey Olson DEPENDENTS / Product Type: Olaf Jones / In time: 3:55 Out time: 4:40 Total Treatment Time: 45 Medicare Time Tracking (below) Total Timed Codes (min):  na 1:1 Treatment Time:  na  
TREATMENT AREA =  Lower back pain [M54.5] SUBJECTIVE Pain Level (on 0 to 10 scale):  1-2  / 10 Medication Changes/New allergies or changes in medical history, any new surgeries or procedures? NO    If yes, update Summary List  
Subjective Functional Status/Changes:  []  No changes reported Just this morning, my L hip started to bother me a bit. OBJECTIVE 30 min Therapeutic Exercise:  [x]  See flow sheet Rationale:      increase ROM, increase strength, improve balance and increase proprioception to improve the patients ability to complete transfers, ADLs  
   
15 min Manual Therapy: T/S mob, L5 PA mob, shot gun tech, L on L SI quita, DTM L para, R piri Rationale:      decrease pain, increase ROM, increase tissue extensibility and decrease trigger points to improve patient's ability to transfer 
 
 min Patient Education:  Lorin Nash []  Progressed/Changed HEP based on: Other Objective/Functional Measures: FOTO = 83 
GROC = +7 Post Treatment Pain Level (on 0 to 10) scale:   0  / 10 ASSESSMENT Assessment/Changes in Function:  
 
Good noe to all Rx without increase in pain  
  
[]  See Progress Note/Recertification Patient will continue to benefit from skilled PT services to modify and progress therapeutic interventions, address functional mobility deficits, address ROM deficits, address strength deficits, analyze and address soft tissue restrictions, analyze and cue movement patterns, analyze and modify body mechanics/ergonomics and assess and modify postural abnormalities to attain remaining goals. Progress toward goals / Updated goals: 
Progressing well with funciton PLAN 
[]  Upgrade activities as tolerated YES Continue plan of care  
[]  Discharge due to :   
[]  Other:   
 
Therapist: Marisa Crespo, PT, OCS, SCS, CSCS Date: 11/20/2018 Time: 10:06 AM  
 
 
Future Appointments Date Time Provider Ame Alba 11/20/2018  4:00 PM Wilfred Barrios PT [facility]  
11/23/2018  8:30 AM Wilfred Barrios PT [facility]  
11/27/2018  4:00 PM Wilfred Barrios PT [facility]  
11/30/2018  7:45 AM Sheryl Nunez MD [facility]  
11/30/2018  1:00 PM Wilfred Barrios PT [facility]  
4/26/2019  1:15 PM Brittany Leonard MD [address] [place] [address]

## 2018-11-23 ENCOUNTER — HOSPITAL ENCOUNTER (OUTPATIENT)
Dept: PHYSICAL THERAPY | Age: 63
Discharge: HOME | End: 2018-11-23
Payer: OTHER GOVERNMENT

## 2018-11-23 PROCEDURE — 97140 MANUAL THERAPY 1/> REGIONS: CPT

## 2018-11-23 PROCEDURE — 97110 THERAPEUTIC EXERCISES: CPT

## 2018-11-23 NOTE — PROGRESS NOTES
PHYSICAL THERAPY - DAILY TREATMENT NOTE Patient Name: Wily Mcmullen        Date: 2018 : 1955   YES Patient  Verified Visit #:     Insurance: Payor: Hayley Lorin / Plan: [employer] Patrice Jackson DEPENDENTS / Product Type: Ariane Mt / In time: 8:20 Out time: 9:00 Total Treatment Time: 40 Medicare Time Tracking (below) Total Timed Codes (min):  na 1:1 Treatment Time:  na  
TREATMENT AREA =  Lower back pain [M54.5] SUBJECTIVE Pain Level (on 0 to 10 scale):  0  / 10 Medication Changes/New allergies or changes in medical history, any new surgeries or procedures? NO    If yes, update Summary List  
Subjective Functional Status/Changes:  []  No changes reported I did a lot of thanksgiving yestereday, but just slight soreness. OBJECTIVE 30 min Therapeutic Exercise:  [x]  See flow sheet Rationale:      increase ROM, increase strength, improve balance and increase proprioception to improve the patients ability to complete transfers, ADLs  
   
8 min Manual Therapy: T/S mob, L5 PA mob Rationale:      decrease pain, increase ROM, increase tissue extensibility and decrease trigger points to improve patient's ability to transfer Dry Needling Procedure Note 
  
Dry Needle Session Number:  94 Procedure: An intramuscular manual therapy (dry needling) and a neuro-muscular re-education treatment was done to deactivate myofascial trigger points, with a 15/30 gauge solid filament needle, under aseptic technique. 
  
Indication(s):                  [x] Muscle spasms                [x] Myalgia/Myositis            [] Muscle cramps                 
                                                               [] Muscle imbalances                   [] TMD (TMJ)                    [] Myofascial pain & dysfunction                                              [] Other: __ 
  
Chart reviewed for the following: Se Hernandez, PT, have reviewed the medical history, summary list and precautions/contraindications for [address] performed immediately prior to start of procedure: 
8:22 (enter time the timeout was completed) Samira TRAMMELL, PT, have performed the following reviews on Nat Walton prior to the start of the session:     
[x] Patient was identified by name and date of birth   
[x] Agreement on all muscles being treated was verified  
[x] Purpose of dry needling, side effects, possible complications, risks and benefits were explained to the patient [x] Procedure site(s) verified [x] Patient was positioned for comfort and draped for privacy [x] Informed Consent was signed (initial visit) and verified verbally (subsequent visits) [x] Patient was instructed on the need to report the use of blood thinners and/or immunosuppressant medications [x] How to respond to possible adverse effects of treatment [x] Self treatment of post needling soreness: ice, heat (moist heat, heat wraps) and stretching 
[x] Opportunity was given to ask any questions, all questions were answered 
  
Treatment: The following muscles were treated today: 
  
Right: L/S para/multifidus, Left: L/S para/multifidus, QL  
   
  
Patients response to todays treatment:  
[x]  LTRs                                   [x]  Muscle Relaxation                                          []  Pain Relief                                         []  Decreased Tinnitus []  Decreased HAs                     []  Post needling soreness            []  Increased MZL                       
[]  Other:    
 min Patient Education:  Pratima Calle []  Progressed/Changed HEP based on: Other Objective/Functional Measures: 
 
Difficulty with stabilizing with SL ex Post Treatment Pain Level (on 0 to 10) scale:   0  / 10 ASSESSMENT Assessment/Changes in Function:  
 
Good noe to all Rx without increase in pain []  See Progress Note/Recertification Patient will continue to benefit from skilled PT services to modify and progress therapeutic interventions, address functional mobility deficits, address ROM deficits, address strength deficits, analyze and address soft tissue restrictions, analyze and cue movement patterns, analyze and modify body mechanics/ergonomics and assess and modify postural abnormalities to attain remaining goals. Progress toward goals / Updated goals: 
Progressing slowly with pain reduction PLAN 
[]  Upgrade activities as tolerated YES Continue plan of care  
[]  Discharge due to :   
[]  Other:   
 
Therapist: Therese Valverde, PT, OCS, SCS, CSCS Date: 11/23/2018 Time: 6:50 AM  
 
 
Future Appointments Date Time Provider Ame Alba 11/23/2018  8:30 AM Emerita Waldron PT [facility]  
11/27/2018  4:00 PM Emerita Waldron PT [facility]  
11/30/2018  7:45 AM Kaley Davis MD [facility]  
11/30/2018  1:00 PM Emerita Waldron PT [facility]  
4/26/2019  1:15 PM Marcella Paz MD [facility]

## 2018-11-26 NOTE — PROGRESS NOTES
[facility] PHYSICAL THERAPY 
[address] [place] [address] [address],[place], [address] [facility] - Phone: [phone]  Fax: [phone] PROGRESS NOTE Patient Name: Eliu Pratt : 1955 Treatment/Medical Diagnosis: Low back pain [M54.5] Referral Source: Bethany Dunham MD      
Date of Initial Visit: 18 Attended Visits: 24 Missed Visits: 0  
  
SUMMARY OF TREATMENT Eliu Pratt has been seen at our clinic 2-3x/wk for a total of 24 visits. Pt treatment has consisted of  therapeutic exercise for lumbar ROM, hip/core strengthening, and manual therapy(jt mobilization and deep tissue mobilization/dry needling) CURRENT STATUS Pt has had a good tolerance to physical therapy treatment. He reports improved ability to perform ADLs including lifting. He also demonstrates significantly improved mechanics with lifting. However, he continues to presents with slight lumbar paraspinus tightness and innominate. Goal/Measure of Progress Goal Met? 1. Will rate >/= +5 on Global Rating of Change and be prepared to DC to HEP. Status at last Eval: na Current Status: +7/10 yes New Goals to be achieved in __4__  weeks: 1. Pt will be independent with advanced HEP in preparation for DC  
2.    
3.    
RECOMMENDATIONS Specifics: 1-2x/wk x 4 more wksIf you have any questions/comments please contact us directly at [phone]. Thank you for allowing us to assist in the care of your patient. Therapist Signature: Anna Andrews, AJAY, OCS, SCS, CSCS Date: 2018 Time: 9:44 AM  
NOTE TO PHYSICIAN:  PLEASE COMPLETE THE ORDERS BELOW AND FAX TO [facility]: [phone] If you are unable to process this request in 24 hours please contact our office: [phone] 
 
___ I have read the above report and request that my patient continue as recommended.  
___ I have read the above report and request that my patient continue therapy with the following changes/special instructions:_________________________________________________________  
___ I have read the above report and request that my patient be discharged from therapy.   
 
Physician Signature:       Date:      Time:

## 2018-11-27 ENCOUNTER — HOSPITAL ENCOUNTER (OUTPATIENT)
Dept: PHYSICAL THERAPY | Age: 63
Discharge: HOME | End: 2018-11-27
Payer: OTHER GOVERNMENT

## 2018-11-27 PROCEDURE — 97110 THERAPEUTIC EXERCISES: CPT

## 2018-11-27 PROCEDURE — 97140 MANUAL THERAPY 1/> REGIONS: CPT

## 2018-11-27 NOTE — PROGRESS NOTES
PHYSICAL THERAPY - DAILY TREATMENT NOTE Patient Name: Levy Cabot        Date: 2018 : 1955   YES Patient  Verified Visit #:   22      39  Insurance: Payor:  / Plan: YumberPatrice Dodd DEPENDENTS / Product Type: Cheryl Dangelo / In time: 4:00 Out time: 4:35 Total Treatment Time: 35 Medicare Time Tracking (below) Total Timed Codes (min):  na 1:1 Treatment Time:  na  
TREATMENT AREA =  Lower back pain [M54.5] SUBJECTIVE Pain Level (on 0 to 10 scale):  0  / 10 Medication Changes/New allergies or changes in medical history, any new surgeries or procedures? NO    If yes, update Summary List  
Subjective Functional Status/Changes:  []  No changes reported I still get stiff when I first get up, but the rest of the time has been good. OBJECTIVE 25 min Therapeutic Exercise:  [x]  See flow sheet Rationale:      increase ROM, increase strength, improve balance and increase proprioception to improve the patients ability to complete transfers, ADLs  
   
10 min Manual Therapy: T/S mob, L/S para, R TFL Rationale:      decrease pain, increase ROM, increase tissue extensibility and decrease trigger points to improve patient's ability to transfer 
 min Patient Education:  Alexa Grullon []  Progressed/Changed HEP based on: Other Objective/Functional Measures: 
 
Decreased soft tissue tension noted at R para Post Treatment Pain Level (on 0 to 10) scale:   0  / 10 ASSESSMENT Assessment/Changes in Function:  
 
Good noe to all Rx without increase in pain  
  
[]  See Progress Note/Recertification Patient will continue to benefit from skilled PT services to modify and progress therapeutic interventions, address functional mobility deficits, address ROM deficits, address strength deficits, analyze and address soft tissue restrictions, analyze and cue movement patterns, analyze and modify body mechanics/ergonomics and assess and modify postural abnormalities to attain remaining goals. Progress toward goals / Updated goals: 
Slowly progressing with pain reduction PLAN 
[]  Upgrade activities as tolerated YES Continue plan of care  
[]  Discharge due to :   
[]  Other:   
 
Therapist: [place] Burrell, PT, OCS, SCS, CSCS Date: 11/27/2018 Time: 10:03 AM  
 
 
Future Appointments Date Time Provider Ame Alba 11/27/2018  4:00 PM Junior Allison PT [facility]  
11/30/2018  7:45 AM Dayan Hightower MD [facility]  
11/30/2018  1:00 PM Junior Keegan PT [facility]  
4/26/2019  1:15 PM Alba Figueroa MD [address] [facility]

## 2018-11-30 ENCOUNTER — HOSPITAL ENCOUNTER (OUTPATIENT)
Dept: PHYSICAL THERAPY | Age: 63
Discharge: HOME | End: 2018-11-30
Payer: OTHER GOVERNMENT

## 2018-11-30 ENCOUNTER — HOSPITAL ENCOUNTER (OUTPATIENT)
Dept: LAB | Age: 63
Discharge: HOME | End: 2018-11-30
Payer: COMMERCIAL

## 2018-11-30 ENCOUNTER — OFFICE VISIT (OUTPATIENT)
Dept: FAMILY MEDICINE CLINIC | Age: 63
End: 2018-11-30

## 2018-11-30 VITALS
SYSTOLIC BLOOD PRESSURE: 108 MMHG | WEIGHT: 232.6 LBS | OXYGEN SATURATION: 95 % | BODY MASS INDEX: 27.47 KG/M2 | HEART RATE: 77 BPM | DIASTOLIC BLOOD PRESSURE: 58 MMHG | HEIGHT: 77 IN | TEMPERATURE: 98.1 F | RESPIRATION RATE: 20 BRPM

## 2018-11-30 DIAGNOSIS — I10 BENIGN ESSENTIAL HTN: ICD-10-CM

## 2018-11-30 DIAGNOSIS — Z79.899 HIGH RISK MEDICATION USE: ICD-10-CM

## 2018-11-30 DIAGNOSIS — E78.2 MIXED HYPERCHOLESTEROLEMIA AND HYPERTRIGLYCERIDEMIA: ICD-10-CM

## 2018-11-30 DIAGNOSIS — Z00.00 ROUTINE GENERAL MEDICAL EXAMINATION AT A HEALTH CARE FACILITY: Primary | ICD-10-CM

## 2018-11-30 DIAGNOSIS — R97.20 ELEVATED PROSTATE SPECIFIC ANTIGEN (PSA): ICD-10-CM

## 2018-11-30 PROCEDURE — G0480 DRUG TEST DEF 1-7 CLASSES: HCPCS

## 2018-11-30 PROCEDURE — 97140 MANUAL THERAPY 1/> REGIONS: CPT

## 2018-11-30 PROCEDURE — 97110 THERAPEUTIC EXERCISES: CPT

## 2018-11-30 PROCEDURE — 80307 DRUG TEST PRSMV CHEM ANLYZR: CPT

## 2018-11-30 RX ORDER — TRIAMTERENE/HYDROCHLOROTHIAZID 37.5-25 MG
1 TABLET ORAL DAILY
Qty: 90 TAB | Refills: 1
Start: 2018-11-30 | End: 2019-04-12 | Stop reason: SDUPTHER

## 2018-11-30 NOTE — PROGRESS NOTES
Assessment/Plan: 1. Routine general medical examination at a health care facility 
-labs good. 2. Benign essential HTN 
-cut maxzide dose. F/u in 4/2019 
 
3. Mixed hypercholesterolemia and hypertriglyceridemia 
-cont statin 4. High risk medication use 
- COMPLIANCE DRUG SCREEN/PRESCRIPTION MONITORING; Future 5. Elevated prostate specific antigen (PSA) 
-followed by urology The plan was discussed with the patient. The patient verbalized understanding and is in agreement with the plan. All medication potential side effects were discussed with the patient. Health Maintenance:  
Health Maintenance Topic Date Due  Shingrix Vaccine Age 50> (1 of 2) 12/05/2005  FOBT Q 1 YEAR, 18+  01/16/2019  
 DTaP/Tdap/Td series (2 - Td) 01/01/2025  Hepatitis C Screening  Completed  Influenza Age 5 to Adult  Completed Kirk Range is a 58 y.o. male and presents with Complete Physical 
  
Subjective: 
Here for physical.  Had labs at [facility].  PSA 10.3. Followed by urology for that. Has lost 50 lb in past several months. bp low end of nml. ROS: 
Constitutional: No recent weight change. No weakness/fatigue. No f/c. Skin: No rashes, change in nails/hair, itching HENT: No HA, dizziness. No hearing loss/tinnitus. No nasal congestion/discharge. Eyes: No change in vision, double/blurred vision or eye pain/redness. Cardiovascular: No CP/palpitations. No RASCON/orthopnea/PND. Respiratory: No cough/sputum, dyspnea, wheezing. Gastointestinal: No dysphagia, reflux. No n/v. No constipation/diarrhea. No melena/rectal bleeding. Genitourinary: No dysuria, urinary hesitancy, nocturia, hematuria. No incontinence. Musculoskeletal: No joint pain/stiffness. No muscle pain/tenderness. Endo: No heat/cold intolerance, no polyuria/polydypsia. Heme: No h/o anemia. No easy bleeding/bruising. Allergy/Immunology: No seasonal rhinitis.  Denies frequent colds, sinus/ear infections. Neurological: No seizures/numbness/weakness. No paresthesias. Psychiatric:  No depression, anxiety. The problem list was updated as a part of today's visit. Patient Active Problem List  
Diagnosis Code  Elevated prostate specific antigen (PSA) R97.20  Medial epicondylitis of elbow M77.00  BPH (benign prostatic hypertrophy) N40.0  Benign essential HTN I10  
 Mixed hypercholesterolemia and hypertriglyceridemia E78.2  Chronic low back pain M54.5, G89.29  
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 SH: Social History Tobacco Use  Smoking status: Former Smoker Last attempt to quit: 1989 Years since quittin.6  Smokeless tobacco: Never Used Substance Use Topics  Alcohol use: Yes Alcohol/week: 20.4 oz Types: 14 Glasses of wine, 14 Cans of beer, 6 Standard drinks or equivalent per week  Drug use: No  
 
 
Medications/Allergies: 
Current Outpatient Medications on File Prior to Visit Medication Sig Dispense Refill  ipratropium (ATROVENT) 42 mcg (0.06 %) nasal spray 2 Sprays by Both Nostrils route four (4) times daily as needed for Rhinitis. 45 mL 1  
 fluticasone (FLONASE) 50 mcg/actuation nasal spray USE 2 SPRAYS IN EACH NOSTRIL DAILY 48 g 2  
 HYDROcodone-acetaminophen (NORCO) 5-325 mg per tablet Take 1 Tab by mouth every eight (8) hours as needed for Pain. Max Daily Amount: 3 Tabs. 20 Tab 0  clonazePAM (KLONOPIN) 0.5 mg tablet Take 1 Tab by mouth nightly. Max Daily Amount: 0.5 mg. 90 Tab 1  
 tamsulosin (FLOMAX) 0.4 mg capsule Take 2 Caps by mouth daily.  180 Cap 3  
 rosuvastatin (CRESTOR) 20 mg tablet TAKE 1 TABLET NIGHTLY 90 Tab 1  
 triamterene-hydroCHLOROthiazide (MAXZIDE) 75-50 mg per tablet TAKE 1 TABLET DAILY 90 Tab 1  
  lisinopril (PRINIVIL, ZESTRIL) 5 mg tablet TAKE 1 TABLET DAILY 90 Tab 1  cyclobenzaprine (FLEXERIL) 10 mg tablet Take 1 Tab by mouth three (3) times daily as needed for Muscle Spasm(s). Indications: Muscle Spasm 90 Tab 0  
 pantoprazole (PROTONIX) 40 mg tablet TAKE 1 TABLET DAILY 90 Tab 3  MULTIVITAMIN W-MINERALS/LUTEIN (CENTRUM SILVER PO) Take  by mouth.  aspirin 81 mg tablet Take 81 mg by mouth.  guaiFENesin-codeine (ROBITUSSIN AC) 100-10 mg/5 mL solution Take 5 mL by mouth nightly as needed for Cough. Max Daily Amount: 5 mL. 180 mL 0 No current facility-administered medications on file prior to visit. Allergies Allergen Reactions  Sulfa (Sulfonamide Antibiotics) Itching Objective: 
Visit Vitals /58 (BP 1 Location: Left arm, BP Patient Position: Sitting) Pulse 77 Temp 98.1 °F (36.7 °C) (Oral) Resp 20 Ht 6' 5\" (1.956 m) Wt 232 lb 9.6 oz (105.5 kg) SpO2 95% BMI 27.58 kg/m² Constitutional: Well developed, nourished, no distress, alert HENT: Exterior ears and tympanic membranes normal bilaterally. Supple neck. No thyromegaly or lymphadenopathy. Oropharynx clear and moist mucous membranes. Normal inferior turbinates. Septum midline. Eyes: Conjunctiva normal. PERRL. Eyelids normal.  
CV: S1, S2.  RRR. No murmurs/rubs. No thrills palpated. No carotid bruits. Intact distal pulses. No edema. No aortic bruits. Pulm: No abnormalities on inspection. Clear to auscultation bilaterally. No wheezing/rhonchi. Normal effort. GI: Soft, nontender, nondistended. Normal active bowel sounds. +small reducible umbilical hernia. MS: Gait normal.  Joints without deformity/tenderness. Strength intact bilateral upper and lower ext. Normal ROM all extremities. Neuro: A/O x 3. No focal motor or sensory deficits. Speech normal.  
Skin: No lesions/rashes on inspection. Psych: Appropriate affect, judgement and insight.   Short-term memory intact.

## 2018-11-30 NOTE — PROGRESS NOTES
PHYSICAL THERAPY - DAILY TREATMENT NOTE Patient Name: Kasia William        Date: 2018 : 1955   YES Patient  Verified Visit #:   32   of   36  Insurance: Payor:  / Plan: PinchdPatrice Dodd DEPENDENTS / Product Type: Rubina Andriy / In time: 12:55 Out time: 1:30 Total Treatment Time: 35 Medicare Time Tracking (below) Total Timed Codes (min):  na 1:1 Treatment Time:  na  
TREATMENT AREA =  Lower back pain [M54.5] SUBJECTIVE Pain Level (on 0 to 10 scale):  0  / 10 Medication Changes/New allergies or changes in medical history, any new surgeries or procedures? NO    If yes, update Summary List  
Subjective Functional Status/Changes:  []  No changes reported Had a tiny twinge this am, but other than that its been good. OBJECTIVE 25 min Therapeutic Exercise:  [x]  See flow sheet Rationale:      increase ROM, increase strength, improve balance and increase proprioception to improve the patients ability to complete transfers, ADLs  
   
8 min Manual Therapy: T/S mob, L5 L ERS quita Rationale:      decrease pain, increase ROM, increase tissue extensibility and decrease trigger points to improve patient's ability to transfer Dry Needling Procedure Note 
  
Dry Needle Session Number:  86 Procedure: An intramuscular manual therapy (dry needling) and a neuro-muscular re-education treatment was done to deactivate myofascial trigger points, with a 15/30 gauge solid filament needle, under aseptic technique. 
  
Indication(s):                  [x] Muscle spasms                [x] Myalgia/Myositis            [] Muscle cramps                 
                                                               [] Muscle imbalances                   [] TMD (TMJ)                    [] Myofascial pain & dysfunction                                              [] Other: __ 
  
Chart reviewed for the following: IClaude Maiers, PT, have reviewed the medical history, summary list and precautions/contraindications for [address] performed immediately prior to start of procedure: 
8:22 (enter time the timeout was completed) IHiram, PT, have performed the following reviews on Katiuska Walton prior to the start of the session:     
[x] Patient was identified by name and date of birth   
[x] Agreement on all muscles being treated was verified  
[x] Purpose of dry needling, side effects, possible complications, risks and benefits were explained to the patient [x] Procedure site(s) verified [x] Patient was positioned for comfort and draped for privacy [x] Informed Consent was signed (initial visit) and verified verbally (subsequent visits) [x] Patient was instructed on the need to report the use of blood thinners and/or immunosuppressant medications [x] How to respond to possible adverse effects of treatment [x] Self treatment of post needling soreness: ice, heat (moist heat, heat wraps) and stretching 
[x] Opportunity was given to ask any questions, all questions were answered 
  
Treatment: The following muscles were treated today: 
  
Right: L/S para/multifidus, psoas Left: L/S para/multifidus, psoas  
   
  
Patients response to todays treatment:  
[x]  LTRs                                   [x]  Muscle Relaxation                                          []  Pain Relief                                         []  Decreased Tinnitus []  Decreased HAs                     []  Post needling soreness            []  Increased RDR                       
[]  Other:    
 
 
 min Patient Education:  [place]encil []  Progressed/Changed HEP based on: Other Objective/Functional Measures: 
 
Cont to be limited in B hip extt Post Treatment Pain Level (on 0 to 10) scale:   0  / 10 ASSESSMENT Assessment/Changes in Function:  
 
Good noe to all Rx without increase in pain []  See Progress Note/Recertification Patient will continue to benefit from skilled PT services to modify and progress therapeutic interventions, address functional mobility deficits, address ROM deficits, address strength deficits, analyze and address soft tissue restrictions, analyze and cue movement patterns, analyze and modify body mechanics/ergonomics and assess and modify postural abnormalities to attain remaining goals. Progress toward goals / Updated goals: 
Progressing slowly with pain reduction PLAN 
[]  Upgrade activities as tolerated YES Continue plan of care  
[]  Discharge due to :   
[]  Other:   
 
Therapist: Haritha De La Rosa, PT, OCS, SCS, CSCS Date: 11/30/2018 Time: 6:13 AM  
 
 
Future Appointments Date Time Provider Ame Alba 11/30/2018  7:45 AM Yessi Viera MD [facility]  
11/30/2018  1:00 PM Gabi Ventura, PT [facility]  
12/14/2018  6:30 AM Gabi Ventura PT [facility]  
12/20/2018  9:30 AM Gabi Ventura PT [facility]  
12/28/2018  2:30 PM Gabi Ventura PT [facility]  
4/26/2019  1:15 PM Altagracia Lin MD [facility]

## 2018-11-30 NOTE — PROGRESS NOTES
Sherice Emanuel is a 58 y.o. male (: 1955) presenting to address: Chief Complaint Patient presents with  Complete Physical  
 
 
Vitals:  
 18 0740 BP: 108/58 Pulse: 77 Resp: 20 Temp: 98.1 °F (36.7 °C) TempSrc: Oral  
SpO2: 95% Weight: 232 lb 9.6 oz (105.5 kg) Height: 6' 5\" (1.956 m) PainSc:   0 - No pain Hearing/Vision:  
 
 Visual Acuity Screening Right eye Left eye Both eyes Without correction:     
With correction: 20/15 20/15-1 20/15 Learning Assessment:  
 
Learning Assessment 2015 PRIMARY LEARNER Patient HIGHEST LEVEL OF EDUCATION - PRIMARY LEARNER  SOME COLLEGE PRIMARY LANGUAGE ENGLISH  
LEARNER PREFERENCE PRIMARY LISTENING  
ANSWERED BY patient RELATIONSHIP SELF Depression Screening: PHQ over the last two weeks 2018 Little interest or pleasure in doing things Not at all Feeling down, depressed, irritable, or hopeless Not at all Total Score PHQ 2 0 Fall Risk Assessment:  
 
Fall Risk Assessment, last 12 mths 2018 Able to walk? Yes Fall in past 12 months? Yes Fall with injury? Yes  
Number of falls in past 12 months 1 Fall Risk Score 2 Abuse Screening:  
 
Abuse Screening Questionnaire 2018 Do you ever feel afraid of your partner? Rella Picket Are you in a relationship with someone who physically or mentally threatens you? Rella Picket Is it safe for you to go home? Aleida Smith Coordination of Care Questionaire: 1. Have you been to the ER, urgent care clinic since your last visit? Hospitalized since your last visit? NO 
 
2. Have you seen or consulted any other health care providers outside of the [address] [place] [address] since your last visit? Include any pap smears or colon screening. NO Advanced Directive: 1. Do you have an Advanced Directive? YES 
 
2. Would you like information on Advanced Directives?  NO

## 2018-12-05 LAB — DRUGS UR: NORMAL

## 2018-12-14 ENCOUNTER — HOSPITAL ENCOUNTER (OUTPATIENT)
Dept: PHYSICAL THERAPY | Age: 63
Discharge: HOME | End: 2018-12-14
Payer: OTHER GOVERNMENT

## 2018-12-14 PROCEDURE — 97110 THERAPEUTIC EXERCISES: CPT

## 2018-12-14 PROCEDURE — 97140 MANUAL THERAPY 1/> REGIONS: CPT

## 2018-12-14 NOTE — PROGRESS NOTES
PHYSICAL THERAPY - DAILY TREATMENT NOTE    Patient Name: Chris Llamas        Date: 2018  : 1955   YES Patient  Verified  Visit #:   32   of   36  Insurance: Payor:  / Plan: ToddPatrice Dodd DEPENDENTS / Product Type:  /      In time: 6:30 Out time: 7:03   Total Treatment Time: 33     Medicare Time Tracking (below)   Total Timed Codes (min):  na 1:1 Treatment Time:  na     TREATMENT AREA =  Lower back pain [M54.5]    SUBJECTIVE  Pain Level (on 0 to 10 scale):  0  / 10   Medication Changes/New allergies or changes in medical history, any new surgeries or procedures? NO    If yes, update Summary List   Subjective Functional Status/Changes:  []  No changes reported     Overall its good, my back really didn't hurt while I was on my long trip          OBJECTIVE  23 min Therapeutic Exercise:  [x]  See flow sheet   Rationale:      increase ROM, increase strength, improve balance and increase proprioception to improve the patients ability to complete transfers, ADLs       8 min Manual Therapy: T/S mob, L5 L ERS quita   Rationale:      decrease pain, increase ROM, increase tissue extensibility and decrease trigger points to improve patient's ability to transfer  Dry Needling Procedure Note     Dry Needle Session Number:  80  Procedure:    An intramuscular manual therapy (dry needling) and a neuro-muscular re-education treatment was done to deactivate myofascial trigger points, with a 15/30 gauge solid filament needle, under aseptic technique.     Indication(s):                  [x] Muscle spasms                [x] Myalgia/Myositis            [] Muscle cramps                                                                                 [] Muscle imbalances                   [] TMD (TMJ)                    [] Myofascial pain & dysfunction                                               [] Other: __     Chart reviewed for the following:  Rupali TRAMMELL PT, have reviewed the medical history, summary list and precautions/contraindications for [id number] [employer] performed immediately prior to start of procedure:  6:32 (enter time the timeout was completed)  IChau PT, have performed the following reviews on So Cummings to the start of the session:      [x] Patient was identified by name and date of birth    [x] Agreement on all muscles being treated was verified   [x] Purpose of dry needling, side effects, possible complications, risks and benefits were explained to the patient   [x] Procedure site(s) verified  [x] Patient was positioned for comfort and draped for privacy  [x] Informed Consent was signed (initial visit) and verified verbally (subsequent visits)  [x] Patient was instructed on the need to report the use of blood thinners and/or immunosuppressant medications  [x] How to respond to possible adverse effects of treatment  [x] Self treatment of post needling soreness: ice, heat (moist heat, heat wraps) and stretching  [x] Opportunity was given to ask any questions, all questions were answered     Treatment:  The following muscles were treated today:     Right: L/S para/multifidus, psoas   Left: L/S para/multifidus, psoas          Patients response to todays treatment:   [x]  LTRs                                   [x]  Muscle Relaxation                                          []  Pain Relief                                         []  Decreased Tinnitus  []  Decreased HAs                     []  Post needling soreness            []  Increased VHR                        []  Other:            min Patient Education:  YES  Reviewed HEP   []  Progressed/Changed HEP based on:         Other Objective/Functional Measures:    Cont to demonstrate instability with SL CCPull on R     Post Treatment Pain Level (on 0 to 10) scale:   0  / 10     ASSESSMENT  Assessment/Changes in Function:     Good noe to all Rx without increase in pain      []  See Progress Note/Recertification   Patient will continue to benefit from skilled PT services to modify and progress therapeutic interventions, address functional mobility deficits, address ROM deficits, address strength deficits, analyze and address soft tissue restrictions, analyze and cue movement patterns, analyze and modify body mechanics/ergonomics and assess and modify postural abnormalities to attain remaining goals.    Progress toward goals / Updated goals:    Slowly progressing with stability     PLAN  []  Upgrade activities as tolerated YES Continue plan of care   []  Discharge due to :    []  Other:      Therapist: Bobo Frederick, PT, OCS, SCS, CSCS    Date: 12/14/2018 Time: 6:28 AM       Future Appointments   Date Time Provider Ame Alba   12/14/2018  6:30 AM Milena Malone PT [facility]   12/20/2018  9:30 AM Milena Malone PT [facility]   12/28/2018  2:30 PM Milena Malone PT [facility]   4/26/2019  1:15 PM MD Alexander Barnett

## 2018-12-20 ENCOUNTER — HOSPITAL ENCOUNTER (OUTPATIENT)
Dept: PHYSICAL THERAPY | Age: 63
Discharge: HOME | End: 2018-12-20
Payer: OTHER GOVERNMENT

## 2018-12-20 PROCEDURE — 97140 MANUAL THERAPY 1/> REGIONS: CPT

## 2018-12-20 PROCEDURE — 97110 THERAPEUTIC EXERCISES: CPT

## 2018-12-20 NOTE — PROGRESS NOTES
PHYSICAL THERAPY - DAILY TREATMENT NOTE    Patient Name: Clare Evans        Date: 2018  : 1955   YES Patient  Verified  Visit #:   28   of   36  Insurance: Payor:  / Plan: ToddPatrice Dodd DEPENDENTS / Product Type:  /      In time: 9:35 Out time: 10:10   Total Treatment Time: 35     Medicare Time Tracking (below)   Total Timed Codes (min):  na 1:1 Treatment Time:  na     TREATMENT AREA =  Lower back pain [M54.5]    SUBJECTIVE  Pain Level (on 0 to 10 scale):  0  / 10   Medication Changes/New allergies or changes in medical history, any new surgeries or procedures? NO    If yes, update Summary List   Subjective Functional Status/Changes:  []  No changes reported     Just had an twinge this am but it went away in a few secodns          OBJECTIVE  23 min Therapeutic Exercise:  [x]  See flow sheet   Rationale:      increase ROM, increase strength, improve balance and increase proprioception to improve the patients ability to complete transfers, ADLs       8 min Manual Therapy: T/S mob, L5 L ERS quita   Rationale:      decrease pain, increase ROM, increase tissue extensibility and decrease trigger points to improve patient's ability to transfer  Dry Needling Procedure Note     Dry Needle Session Number:  26  Procedure:    An intramuscular manual therapy (dry needling) and a neuro-muscular re-education treatment was done to deactivate myofascial trigger points, with a 15/30 gauge solid filament needle, under aseptic technique.     Indication(s):                  [x] Muscle spasms                [x] Myalgia/Myositis            [] Muscle cramps                                                                                 [] Muscle imbalances                   [] TMD (TMJ)                    [] Myofascial pain & dysfunction                                               [] Other: __     Chart reviewed for the following:  Kenya TRAMMELL, PT, have reviewed the medical history, summary list and precautions/contraindications for [id number] [address] performed immediately prior to start of procedure:  9:37 (enter time the timeout was completed)  IJoao PT, have performed the following reviews on Renetta Kasper to the start of the session:      [x] Patient was identified by name and date of birth    [x] Agreement on all muscles being treated was verified   [x] Purpose of dry needling, side effects, possible complications, risks and benefits were explained to the patient   [x] Procedure site(s) verified  [x] Patient was positioned for comfort and draped for privacy  [x] Informed Consent was signed (initial visit) and verified verbally (subsequent visits)  [x] Patient was instructed on the need to report the use of blood thinners and/or immunosuppressant medications  [x] How to respond to possible adverse effects of treatment  [x] Self treatment of post needling soreness: ice, heat (moist heat, heat wraps) and stretching  [x] Opportunity was given to ask any questions, all questions were answered     Treatment:  The following muscles were treated today:     Right: L/S para/multifidus, psoas   Left: L/S para/multifidus, psoas          Patients response to todays treatment:   [x]  LTRs                                   [x]  Muscle Relaxation                                          []  Pain Relief                                         []  Decreased Tinnitus  []  Decreased HAs                     []  Post needling soreness            []  Increased LLR                        []  Other:            min Patient Education:  YES  Reviewed HEP   []  Progressed/Changed HEP based on:         Other Objective/Functional Measures:    FOTO = 94       Post Treatment Pain Level (on 0 to 10) scale:   0  / 10     ASSESSMENT  Assessment/Changes in Function:     Good otl to all Rx without increase in pain      []  See Progress Note/Recertification   Patient will continue to benefit from skilled PT services to modify and progress therapeutic interventions, address functional mobility deficits, address ROM deficits, address strength deficits, analyze and address soft tissue restrictions, analyze and cue movement patterns, analyze and modify body mechanics/ergonomics and assess and modify postural abnormalities to attain remaining goals. Progress toward goals / Updated goals:    Plan to DC next visit.       PLAN  []  Upgrade activities as tolerated YES Continue plan of care   []  Discharge due to :    []  Other:      Therapist: Virginia Burrell, PT, OCS, SCS, CSCS    Date: 12/20/2018 Time: 6:33 AM       Future Appointments   Date Time Provider Ame Alba   12/20/2018  9:30 AM Junior Allison PT [facility]   12/28/2018  2:30 PM Junior Keegan PT [facility]   4/26/2019  1:15 PM Alba Figueroa MD [id number]

## 2018-12-28 ENCOUNTER — HOSPITAL ENCOUNTER (OUTPATIENT)
Dept: PHYSICAL THERAPY | Age: 63
Discharge: HOME | End: 2018-12-28
Payer: OTHER GOVERNMENT

## 2018-12-28 PROCEDURE — 97110 THERAPEUTIC EXERCISES: CPT

## 2018-12-28 PROCEDURE — 97140 MANUAL THERAPY 1/> REGIONS: CPT

## 2018-12-28 NOTE — PROGRESS NOTES
PHYSICAL THERAPY - DAILY TREATMENT NOTE Patient Name: Rickey Motley        Date: 2018 : 1955   YES Patient  Verified Visit #:   34   of   36  Insurance: Payor:  / Plan: Susan Gay DEPENDENTS / Product Type: [employer] G [employer] / In time: 2:20 Out time: 2:55 Total Treatment Time: 35 Medicare Time Tracking (below) Total Timed Codes (min):  na 1:1 Treatment Time:  na  
TREATMENT AREA =  Lower back pain [M54.5] SUBJECTIVE Pain Level (on 0 to 10 scale):  0  / 10 Medication Changes/New allergies or changes in medical history, any new surgeries or procedures? NO    If yes, update Summary List  
Subjective Functional Status/Changes:  []  No changes reported I felt a little twinge this morning, but its been good. OBJECTIVE 23 min Therapeutic Exercise:  [x]  See flow sheet Rationale:      increase ROM, increase strength, improve balance and increase proprioception to improve the patients ability to complete transfers, ADLs  
   
8 min Manual Therapy: T/S mob, L5 L ERS quita Rationale:      decrease pain, increase ROM, increase tissue extensibility and decrease trigger points to improve patient's ability to transfer Dry Needling Procedure Note 
  
Dry Needle Session Number:  68 Procedure: An intramuscular manual therapy (dry needling) and a neuro-muscular re-education treatment was done to deactivate myofascial trigger points, with a 15/30 gauge solid filament needle, under aseptic technique. 
  
Indication(s):                  [x] Muscle spasms                [x] Myalgia/Myositis            [] Muscle cramps                 
                                                               [] Muscle imbalances                   [] TMD (TMJ)                    [] Myofascial pain & dysfunction                                              [] Other: __ 
  
Chart reviewed for the following: Mady Joshi, PT, have reviewed the medical history, summary list and precautions/contraindications for [address] performed immediately prior to start of procedure: 
2:22 (enter time the timeout was completed) IParis, PT, have performed the following reviews on Shaista Walton prior to the start of the session:     
[x] Patient was identified by name and date of birth   
[x] Agreement on all muscles being treated was verified  
[x] Purpose of dry needling, side effects, possible complications, risks and benefits were explained to the patient [x] Procedure site(s) verified [x] Patient was positioned for comfort and draped for privacy [x] Informed Consent was signed (initial visit) and verified verbally (subsequent visits) [x] Patient was instructed on the need to report the use of blood thinners and/or immunosuppressant medications [x] How to respond to possible adverse effects of treatment [x] Self treatment of post needling soreness: ice, heat (moist heat, heat wraps) and stretching 
[x] Opportunity was given to ask any questions, all questions were answered 
  
Treatment: The following muscles were treated today: 
  
Right: L/S para/multifidus, psoas Left: L/S para/multifidus, psoas  
   
  
Patients response to todays treatment:  
[x]  LTRs                                   [x]  Muscle Relaxation                                          []  Pain Relief                                         []  Decreased Tinnitus []  Decreased HAs                     []  Post needling soreness            []  Increased HER                       
[]  Other:    
 min Patient Education:  Roxana Jessica []  Progressed/Changed HEP based on: Other Objective/Functional Measures: 
 
Independent with all Ex Post Treatment Pain Level (on 0 to 10) scale:   0  / 10 ASSESSMENT Assessment/Changes in Function:  
 
See DC 
  
[]  See Progress Note/Recertification Progress toward goals / Updated goals: 
See DC  
 
PLAN 
[]  Upgrade activities as tolerated  Continue plan of care [x]  Discharge due to : Met all goals  
[]  Other:   
 
Therapist: Arnoldo Blair, PT, OCS, SCS, CSCS Date: 12/28/2018 Time: 8:31 AM  
 
 
Future Appointments Date Time Provider Ame Alba 12/28/2018  2:30 PM Ernie Herron PT [facility]  
4/26/2019  1:15 PM Marycruz Perry MD [id number]

## 2018-12-28 NOTE — PROGRESS NOTES
[address] PHYSICAL THERAPY 
[address] [place] [address], [place] [address],[facility], [address] - Phone: [phone]  Fax: [phone] DISCHARGE SUMMARY Patient Name: Cassandra Tolliver : 1955 Treatment/Medical Diagnosis: Lower back pain [M54.5] Referral Source: Shonda Rojas MD    
Date of Initial Visit: 18 Attended Visits: 29 Missed Visits: 0  
SUMMARY OF TREATMENT Cassandra Tolliver has been seen at our clinic 2-3x/wk for a total of 29 visits. Pt treatment has consisted of therapeutic exercise for lumbar ROM, hip/core strengthening, and manual therapy (jt mobilization and deep tissue mobilization/dry needling) CURRENT STATUS Pt has had a good tolerance to physical therapy treatment. He reports significantly improved level of pain and now only complains of occasional minor pain which resolves in minutes. He also demonstrates significantly improved mechanics with lifting. We believe that he will be able to continue to progress with his pain reduction and function independently at this point. Goal/Measure of Progress Goal Met? 1. Will rate >/= +5 on Global Rating of Change and be prepared to DC to HEP. Status at last Eval: na Current Status: +7 yes RECOMMENDATIONS Discharge from physical therapy treatment with HEP. Specifics: If you have any questions/comments please contact us directly at [phone]. Thank you for allowing us to assist in the care of your patient. Therapist Signature: Traci Rosado, DPT, OCS, SCS, CSCS Date: 2018   Time: 2:57 PM

## 2019-01-25 DIAGNOSIS — I10 BENIGN ESSENTIAL HTN: ICD-10-CM

## 2019-01-25 RX ORDER — LISINOPRIL 5 MG/1
TABLET ORAL
Qty: 90 TAB | Refills: 1 | Status: SHIPPED | OUTPATIENT
Start: 2019-01-25 | End: 2019-04-12 | Stop reason: SDUPTHER

## 2019-04-05 DIAGNOSIS — G47.50 PARASOMNIA: ICD-10-CM

## 2019-04-05 RX ORDER — CLONAZEPAM 0.5 MG/1
0.5 TABLET ORAL
Qty: 30 TAB | Refills: 0 | Status: SHIPPED | OUTPATIENT
Start: 2019-04-05 | End: 2019-04-12 | Stop reason: SDUPTHER

## 2019-04-12 ENCOUNTER — HOSPITAL ENCOUNTER (OUTPATIENT)
Dept: LAB | Age: 64
Discharge: HOME | End: 2019-04-12
Payer: OTHER GOVERNMENT

## 2019-04-12 ENCOUNTER — OFFICE VISIT (OUTPATIENT)
Dept: FAMILY MEDICINE CLINIC | Age: 64
End: 2019-04-12

## 2019-04-12 VITALS
HEIGHT: 77 IN | HEART RATE: 70 BPM | DIASTOLIC BLOOD PRESSURE: 80 MMHG | WEIGHT: 240 LBS | TEMPERATURE: 96.7 F | OXYGEN SATURATION: 96 % | SYSTOLIC BLOOD PRESSURE: 126 MMHG | BODY MASS INDEX: 28.34 KG/M2 | RESPIRATION RATE: 18 BRPM

## 2019-04-12 DIAGNOSIS — M54.50 CHRONIC MIDLINE LOW BACK PAIN WITHOUT SCIATICA: ICD-10-CM

## 2019-04-12 DIAGNOSIS — G89.29 CHRONIC MIDLINE LOW BACK PAIN WITHOUT SCIATICA: ICD-10-CM

## 2019-04-12 DIAGNOSIS — E78.2 MIXED HYPERCHOLESTEROLEMIA AND HYPERTRIGLYCERIDEMIA: Primary | ICD-10-CM

## 2019-04-12 DIAGNOSIS — I10 BENIGN ESSENTIAL HTN: ICD-10-CM

## 2019-04-12 DIAGNOSIS — Z12.11 SCREEN FOR COLON CANCER: ICD-10-CM

## 2019-04-12 DIAGNOSIS — R97.20 ELEVATED PSA: ICD-10-CM

## 2019-04-12 DIAGNOSIS — R97.20 ELEVATED PROSTATE SPECIFIC ANTIGEN (PSA): ICD-10-CM

## 2019-04-12 DIAGNOSIS — Z00.00 ROUTINE GENERAL MEDICAL EXAMINATION AT A HEALTH CARE FACILITY: ICD-10-CM

## 2019-04-12 DIAGNOSIS — I10 BENIGN ESSENTIAL HTN: Primary | ICD-10-CM

## 2019-04-12 DIAGNOSIS — G47.50 PARASOMNIA: ICD-10-CM

## 2019-04-12 PROCEDURE — 36415 COLL VENOUS BLD VENIPUNCTURE: CPT

## 2019-04-12 PROCEDURE — 84153 ASSAY OF PSA TOTAL: CPT

## 2019-04-12 RX ORDER — CLONAZEPAM 0.5 MG/1
0.5 TABLET ORAL
Qty: 90 TAB | Refills: 1 | Status: SHIPPED | OUTPATIENT
Start: 2019-05-01 | End: 2019-11-07 | Stop reason: SDUPTHER

## 2019-04-12 RX ORDER — TRIAMTERENE/HYDROCHLOROTHIAZID 37.5-25 MG
1 TABLET ORAL DAILY
Qty: 90 TAB | Refills: 1 | Status: SHIPPED | OUTPATIENT
Start: 2019-04-12 | End: 2019-09-21 | Stop reason: SDUPTHER

## 2019-04-12 RX ORDER — LISINOPRIL 5 MG/1
TABLET ORAL
Qty: 90 TAB | Refills: 1 | Status: SHIPPED | OUTPATIENT
Start: 2019-04-12 | End: 2019-12-11 | Stop reason: SDUPTHER

## 2019-04-12 RX ORDER — CLONAZEPAM 0.5 MG/1
0.5 TABLET ORAL
Qty: 30 TAB | Refills: 5 | Status: SHIPPED | OUTPATIENT
Start: 2019-05-01 | End: 2019-04-12 | Stop reason: SDUPTHER

## 2019-04-12 NOTE — PROGRESS NOTES
Assessment/Plan: 1. Benign essential HTN 
-cont current 
- lisinopril (PRINIVIL, ZESTRIL) 5 mg tablet; TAKE 1 TABLET DAILY  Dispense: 90 Tab; Refill: 1 
- triamterene-hydroCHLOROthiazide (MAXZIDE) 37.5-25 mg per tablet; Take 1 Tab by mouth daily. Dispense: 90 Tab; Refill: 1 2. Parasomnia 
-refilled 6 mos. UDS 11/2018. 
- clonazePAM (KLONOPIN) 0.5 mg tablet; Take 1 Tab by mouth nightly. Max Daily Amount: 0.5 mg.  Dispense: 30 Tab; Refill: 5 
 
3. Chronic midline low back pain without sciatica 
-managed by sports meds/Dr. Justine Shultz 4. Elevated PSA 
-ck labs today - PSA W/ REFLX FREE PSA; Future The plan was discussed with the patient. The patient verbalized understanding and is in agreement with the plan. All medication potential side effects were discussed with the patient. Health Maintenance:  
Health Maintenance Topic Date Due  Shingrix Vaccine Age 50> (1 of 2) 12/05/2005  FOBT Q 1 YEAR, 18+  01/16/2019  Influenza Age 5 to Adult  08/01/2019  
 DTaP/Tdap/Td series (2 - Td) 01/01/2025  Hepatitis C Screening  Completed  Pneumococcal 0-64 years  Aged Out Delories Norwegian is a 61 y.o. male and presents with Hypertension Subjective: HTN - dose of maxzide decreased last visit due to low bps after weight loss. bp good today. Parasomnia - on klonopin nightly for this. UDS 11/2018 appropriate. Elevated psa- followed by Dr. Cat Beck. Due for repeat PSA. Chronic intermittent low back pain - is seeing a osteopathic manipulator/sports med doc as well as Dr. Leonor Murillo. Intermittently needs flexeril and pain meds. ROS: 
Constitutional: No recent weight change. No weakness/fatigue. No f/c. Cardiovascular: No CP/palpitations. No RASCON/orthopnea/PND. Respiratory: No cough/sputum, dyspnea, wheezing. Gastointestinal: No dysphagia, reflux. No n/v. No constipation/diarrhea. No melena/rectal bleeding. Genitourinary: No dysuria, urinary hesitancy, nocturia, hematuria. No incontinence. The problem list was updated as a part of today's visit. Patient Active Problem List  
Diagnosis Code  Elevated prostate specific antigen (PSA) R97.20  Medial epicondylitis of elbow M77.00  BPH (benign prostatic hypertrophy) N40.0  Benign essential HTN I10  
 Mixed hypercholesterolemia and hypertriglyceridemia E78.2  Chronic low back pain M54.5, G89.29  
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 SH: Social History Tobacco Use  Smoking status: Former Smoker Last attempt to quit: 1989 Years since quittin.9  Smokeless tobacco: Never Used Substance Use Topics  Alcohol use: Yes Alcohol/week: 20.4 oz Types: 14 Glasses of wine, 14 Cans of beer, 6 Standard drinks or equivalent per week  Drug use: No  
 
 
Medications/Allergies: 
Current Outpatient Medications on File Prior to Visit Medication Sig Dispense Refill  OTHER     
 rosuvastatin (CRESTOR) 20 mg tablet TAKE 1 TABLET NIGHTLY 90 Tab 1  
 lisinopril (PRINIVIL, ZESTRIL) 5 mg tablet TAKE 1 TABLET DAILY 90 Tab 1  
 triamterene-hydroCHLOROthiazide (MAXZIDE) 37.5-25 mg per tablet Take 1 Tab by mouth daily. 90 Tab 1  
 ipratropium (ATROVENT) 42 mcg (0.06 %) nasal spray 2 Sprays by Both Nostrils route four (4) times daily as needed for Rhinitis. 45 mL 1  
 fluticasone (FLONASE) 50 mcg/actuation nasal spray USE 2 SPRAYS IN EACH NOSTRIL DAILY 48 g 2  
 tamsulosin (FLOMAX) 0.4 mg capsule Take 2 Caps by mouth daily. 180 Cap 3  cyclobenzaprine (FLEXERIL) 10 mg tablet Take 1 Tab by mouth three (3) times daily as needed for Muscle Spasm(s).  Indications: Muscle Spasm 90 Tab 0  
 pantoprazole (PROTONIX) 40 mg tablet TAKE 1 TABLET DAILY 90 Tab 3  
  MULTIVITAMIN W-MINERALS/LUTEIN (CENTRUM SILVER PO) Take  by mouth.  aspirin 81 mg tablet Take 81 mg by mouth. No current facility-administered medications on file prior to visit. Allergies Allergen Reactions  Sulfa (Sulfonamide Antibiotics) Itching Objective: 
Visit Vitals /80 (BP 1 Location: Left arm, BP Patient Position: Sitting) Pulse 70 Temp 96.7 °F (35.9 °C) (Oral) Resp 18 Ht 6' 5\" (1.956 m) Wt 240 lb (108.9 kg) SpO2 96% BMI 28.46 kg/m² Constitutional: Well developed, nourished, no distress, alert CV: S1, S2.  RRR. No murmurs/rubs. No thrills palpated. No carotid bruits. Intact distal pulses. No edema. No aortic bruits. Pulm: No abnormalities on inspection. Clear to auscultation bilaterally. No wheezing/rhonchi. Normal effort. GI: Soft, nontender, nondistended. Normal active bowel sounds.

## 2019-04-12 NOTE — PROGRESS NOTES
Sonya Muniz and had [facility] 2019 spinal injection Bev Spain is a 61 y.o. male (: 1955) presenting to address: Chief Complaint Patient presents with  Hypertension Vitals:  
 19 0720 BP: 126/80 Pulse: 70 Resp: 18 Temp: 96.7 °F (35.9 °C) TempSrc: Oral  
SpO2: 96% Weight: 240 lb (108.9 kg) Height: 6' 5\" (1.956 m) PainSc:   0 - No pain Hearing/Vision: No exam data present Learning Assessment:  
 
Learning Assessment 2015 PRIMARY LEARNER Patient HIGHEST LEVEL OF EDUCATION - PRIMARY LEARNER  SOME COLLEGE PRIMARY LANGUAGE ENGLISH  
LEARNER PREFERENCE PRIMARY LISTENING  
ANSWERED BY patient RELATIONSHIP SELF Depression Screening:  
 
3 most recent PHQ Screens 2019 Little interest or pleasure in doing things Not at all Feeling down, depressed, irritable, or hopeless Not at all Total Score PHQ 2 0 Fall Risk Assessment:  
 
Fall Risk Assessment, last 12 mths 2018 Able to walk? Yes Fall in past 12 months? Yes Fall with injury? Yes  
Number of falls in past 12 months 1 Fall Risk Score 2 Abuse Screening:  
 
Abuse Screening Questionnaire 2019 Do you ever feel afraid of your partner? Mabel Kaufman Are you in a relationship with someone who physically or mentally threatens you? Mabel Kaufman Is it safe for you to go home? Radha Crane Coordination of Care Questionaire: 1. Have you been to the ER, urgent care clinic since your last visit? Hospitalized since your last visit? No  
 
2. Have you seen or consulted any other health care providers outside of the [address] [place] [address] since your last visit? Include any pap smears or colon screening. Yes ortho Dr David Muniz and [facility] 2019 Advanced Directive: 1. Do you have an Advanced Directive? Yes  
 
2. Would you like information on Advanced Directives? No  
 
 
Health Maintenance Due Topic Date Due  Shingrix Vaccine Age 50> (1 of 2) 2005  FOBT Q 1 YEAR, 18+  01/16/2019

## 2019-04-13 LAB
PSA SERPL-MCNC: 13.1 NG/ML (ref 0–4)
REFLEX CRITERIA: ABNORMAL

## 2019-07-08 RX ORDER — PANTOPRAZOLE SODIUM 40 MG/1
TABLET, DELAYED RELEASE ORAL
Qty: 90 TAB | Refills: 3 | Status: SHIPPED | OUTPATIENT
Start: 2019-07-08 | End: 2020-08-24 | Stop reason: SDUPTHER

## 2019-07-12 LAB — PSA, EXTERNAL: 15.05

## 2019-08-30 DIAGNOSIS — E78.2 MIXED HYPERCHOLESTEROLEMIA AND HYPERTRIGLYCERIDEMIA: ICD-10-CM

## 2019-08-30 RX ORDER — ROSUVASTATIN CALCIUM 20 MG/1
TABLET, COATED ORAL
Qty: 90 TAB | Refills: 4 | Status: SHIPPED | OUTPATIENT
Start: 2019-08-30 | End: 2020-08-24 | Stop reason: SDUPTHER

## 2019-09-21 DIAGNOSIS — I10 BENIGN ESSENTIAL HTN: ICD-10-CM

## 2019-09-23 RX ORDER — TRIAMTERENE/HYDROCHLOROTHIAZID 37.5-25 MG
TABLET ORAL
Qty: 90 TAB | Refills: 4 | Status: SHIPPED | OUTPATIENT
Start: 2019-09-23 | End: 2020-12-14

## 2019-10-01 LAB — PSA, EXTERNAL: 15.01

## 2019-10-17 ENCOUNTER — HOSPITAL ENCOUNTER (OUTPATIENT)
Dept: PHYSICAL THERAPY | Age: 64
Discharge: HOME | End: 2019-10-17
Payer: OTHER GOVERNMENT

## 2019-10-17 PROCEDURE — 97162 PT EVAL MOD COMPLEX 30 MIN: CPT

## 2019-10-17 PROCEDURE — 97140 MANUAL THERAPY 1/> REGIONS: CPT

## 2019-10-17 NOTE — PROGRESS NOTES
PHYSICAL THERAPY - DAILY TREATMENT NOTE    Patient Name: Angelito Ellison        Date: 10/17/2019  : 1955   YES Patient  Verified  Visit #:     Insurance: Payor: Sarahi Swanson / Plan: Maryann Ortiz / Product Type: Commerical /      In time: 7:30 Out time: 8:10   Total Treatment Time: 40     Medicare Time Tracking (below)   Total Timed Codes (min):  na 1:1 Treatment Time:  na     TREATMENT AREA =  Low back pain [M54.5]    SUBJECTIVE  Pain Level (on 0 to 10 scale):    Medication Changes/New allergies or changes in medical history, any new surgeries or procedures? NO    If yes, update Summary List   Subjective Functional Status/Changes:  []  No changes reported     SEE IE          OBJECTIVE    13 min Manual Therapy: Shot gun tech, L IS ant inn quita, L 4-5 L ERS, L on L SI quita   Rationale:      decrease pain, increase ROM and increase tissue extensibility to improve patient's ability to perform ADLs    5 min Self Care: PAM hip shift   Rationale:    increase ROM, increase strength and improve coordination to improve the patients ability to perform ADLs    Dry Needling Procedure Note    Dry Needle Session Number:  1    Procedure: An intramuscular manual therapy (dry needling) and a neuro-muscular re-education treatment was done to deactivate myofascial trigger points, with a 15/30 gauge solid filament needle, under aseptic technique. Indication(s): [x] Muscle spasms [] Myalgia/Myositis  [] Muscle cramps      [] Muscle imbalances [] TMD (TMJ) [] Myofascial pain & dysfunction     [] Other: __    Chart reviewed for the following:  Jeremy TRAMMELL PT, have reviewed the medical history, summary list and precautions/contraindications for Angelito Ellison.     TIME OUT performed immediately prior to start of procedure:  7:59 (enter time the timeout was completed)  Jeremy TRAMMELL PT, have performed the following reviews on Angelito Ellison prior to the start of the session:      [x] Patient was identified by name and date of birth    [x] Agreement on all muscles being treated was verified   [x] Purpose of dry needling, side effects, possible complications, risks and benefits were explained to the patient   [x] Procedure site(s) verified  [x] Patient was positioned for comfort and draped for privacy  [x] Informed Consent was signed (initial visit) and verified verbally (subsequent visits)  [x] Patient was instructed on the need to report the use of blood thinners and/or immunosuppressant medications  [x] How to respond to possible adverse effects of treatment  [x] Self treatment of post needling soreness: ice, heat (moist heat, heat wraps) and stretching  [x] Opportunity was given to ask any questions, all questions were answered            Treatment:  The following muscles were treated today:    Right: L/S Para/multifidus, Gm   Left: L/S Para/multifidus,     Patients response to todays treatment:   []  LTRs  []  Muscle Relaxation  []  Pain Relief  []  Decreased Tinnitus  []  Decreased HAs []  Post needling soreness []  Increased ROM   []  Other:      Actual needle insertion time is not billed     Billed With/As:   [] TE   [] TA   [] Neuro   [x] Self Care Patient Education: [x] Review HEP    [] Progressed/Changed HEP based on:   [] positioning   [] body mechanics   [] transfers   [] heat/ice application    [] other:       min Patient Education:  YES  Reviewed HEP   []  Progressed/Changed HEP based on:         Other Objective/Functional Measures:    SEE IE     Post Treatment Pain Level (on 0 to 10) scale:       ASSESSMENT  Assessment/Changes in Function:     SEE IE     []  See Progress Note/Recertification   Patient will continue to benefit from skilled PT services to modify and progress therapeutic interventions, address functional mobility deficits, address ROM deficits, address strength deficits, analyze and address soft tissue restrictions, analyze and cue movement patterns, analyze and modify body mechanics/ergonomics and assess and modify postural abnormalities to attain remaining goals.    Progress toward goals / Updated goals:         PLAN  []  Upgrade activities as tolerated YES Continue plan of care   []  Discharge due to :    []  Other:      Therapist: Ana Lowery, PT, OCS, SCS, CSCS    Date: 10/17/2019 Time: 8:20 AM       Future Appointments   Date Time Provider Ame Anjali   10/18/2019  7:00 AM Amanda Cancel, PT [facility]   10/21/2019  9:00 AM Amanda Cancel, PT [facility]   11/6/2019 10:00 AM Amanda Cancel, PT [facility]   11/8/2019  9:00 AM Amanda Cancel, PT [facility]   11/11/2019 10:00 AM Amanda Cancel, PT [facility]   11/13/2019 10:00 AM Amanda Cancel, PT [facility]   11/18/2019 10:00 AM Amanda Cancel, PT [facility]   11/20/2019 10:00 AM Amanda Cancel, PT [facility]   4/10/2020  8:30 AM Daniel Myrick MD [address] [facility]

## 2019-10-17 NOTE — PROGRESS NOTES
[address] [place] [address], [address] [facility] [address] - Phone: [phone]  Fax: [phone] / [address] [facility]  Patient Name: Donavan Merritt : 1955   Medical   Diagnosis: Low back pain [M54.5] Treatment Diagnosis: Low back pain [M54.5]   Onset Date: Chronic     Referral Source: Sydnie Lovell DO Start of Care [facility] 10/17/2019   Prior Hospitalization: See medical history Provider #: [id number]   Prior Level of Function: Min pain with ADLs   Comorbidities: HTN   Medications: Verified on Patient Summary List   The Plan of Care and following information is based on the information from the initial evaluation.   ===========================================================================================  Assessment / key information:  Donavan Merritt is a 61 y.o.  yo male with Dx of Low back pain [M54.5]. He reports chronic Hx of recurring LBP. He currently rates his pain as 10/10 at worst, 0/10 at best, primarily located at lower lumbar region as well as lateral aspect of his R hip. He complains of difficulty and increase pain with bending forward and walking. Objective Findings:  Lumbar ROM: Flx  = 30%, Ext = limited by 30%, Rot: R = limited by 10%, L = limited by 30%. Manual Muscle Testing:   L hip abd and B hip ext are Reduced. Lumbar/SI Screening: L iliosacral ant innominate, L 4-5 L ERS, L on L SI innominate noted.   Pt instructed in HEP and will f/u in clinic for PT.  ===========================================================================================  Eval Complexity: History MEDIUM  Complexity : 1-2 comorbidities / personal factors will impact the outcome/ POC ;  Examination  MEDIUM Complexity : 3 Standardized tests and measures addressing body structure, function, activity limitation and / or participation in recreation ; Presentation MEDIUM Complexity : Evolving with changing characteristics ; Decision Making MEDIUM Complexity : FOTO score of 26-74; Overall Complexity MEDIUM  Problem List: pain affecting function, decrease ROM, decrease strength, decrease ADL/ functional abilitiies, decrease activity tolerance and decrease flexibility/ joint mobility   Treatment Plan may include any combination of the following: Therapeutic exercise, Therapeutic activities, Neuromuscular re-education, Physical agent/modality, Manual therapy, Patient education, Self Care training and Functional mobility training  Patient / Family readiness to learn indicated by: asking questions  Persons(s) to be included in education: patient (P)  Barriers to Learning/Limitations: None  Measures taken, if barriers to learning:    Patient Goal (s): Decrease pain    Patient self reported health status: excellent  Rehabilitation Potential: good     Short Term Goals: To be accomplished in  1-2  weeks:  1. Independent with HEP. 2. Decrease max pain 25-50% to assist with ADLs   Long Term Goals: To be accomplished in  3-4  weeks:  1. Decrease max pain 50-75% to assist with ADLs  2. Increase FOTO score to 67% to show functional improvment. 3.  Will rate  >/= +5 on Global Rating of Change and be prepared to DC to HEP. Frequency / Duration:   Patient to be seen  2-3  times per week for 3-4  weeks:  Patient / Caregiver education and instruction: self care and exercises    Therapist Signature: Mohit Estes DPT, OCS, SCS, CSCS Date: 23/72/0181   Certification Period: na Time: 8:23 AM   ===========================================================================================  I certify that the above Physical Therapy Services are being furnished while the patient is under my care. I agree with the treatment plan and certify that this therapy is necessary. Physician Signature:        Date:       Time:     Please sign and return to [facility] or you may fax the signed copy to [phone]. Thank you.

## 2019-10-18 ENCOUNTER — HOSPITAL ENCOUNTER (OUTPATIENT)
Dept: PHYSICAL THERAPY | Age: 64
Discharge: HOME | End: 2019-10-18
Payer: OTHER GOVERNMENT

## 2019-10-18 PROCEDURE — 97110 THERAPEUTIC EXERCISES: CPT

## 2019-10-18 PROCEDURE — 97140 MANUAL THERAPY 1/> REGIONS: CPT

## 2019-10-18 NOTE — PROGRESS NOTES
PHYSICAL THERAPY - DAILY TREATMENT NOTE    Patient Name:  Sat        Date: 10/18/2019  : 1955   yes Patient  Verified  Visit #:   2   of   12  Insurance: Payor: Akila Matthews / Plan: Sonny Mendez / Product Type: Commerical /      In time: 7:00 Out time: 7:40   Total Treatment Time: 40     Medicare/[facility] Time Tracking (below)   Total Timed Codes (min):  na 1:1 Treatment Time:  na     TREATMENT AREA =  Low back pain [M54.5]  SUBJECTIVE  Pain Level (on 0 to 10 scale):  0  / 10   Medication Changes/New allergies or changes in medical history, any new surgeries or procedures?    no  If yes, update Summary List   Subjective Functional Status/Changes:  []  No changes reported     A little sore but no real pain           OBJECTIVE          27 min Therapeutic Exercise:  [x]  See flow sheet   Rationale:      increase ROM, increase strength and improve coordination to improve the patients ability to perform ADLs     13 min Manual Therapy: T/S mob, Shot gun tech, L IS ant inn quita, L 4-5 L ERS,    Rationale:      decrease pain, increase ROM and increase tissue extensibility to improve patient's ability to perform ADLs      Billed With/As:   [] TE   [] TA   [] Neuro   [] Self Care Patient Education: [x] Review HEP    [] Progressed/Changed HEP based on:   [] positioning   [] body mechanics   [] transfers   [] heat/ice application    [] other:      Other Objective/Functional Measures:    Limited L thoracic Rot noted. today     Post Treatment Pain Level (on 0 to 10) scale:   0  / 10     ASSESSMENT  Assessment/Changes in Function:     No pain with sit to stand transfer after man Rx     []  See Progress Note/Recertification   Patient will continue to benefit from skilled PT services to modify and progress therapeutic interventions, address functional mobility deficits and address ROM deficits to attain remaining goals. Progress toward goals / Updated goals:     Independent with HEP     PLAN  [x]  Upgrade activities as tolerated yes Continue plan of care   []  Discharge due to :    []  Other:      Therapist: Jonathan Moss, PT    Date: 10/18/2019 Time: 6:27 AM     Future Appointments   Date Time Provider Ame Alba   10/18/2019  7:00 AM Melanie Blake, PT [facility]   10/21/2019  9:00 AM Melanie Blake, PT [facility]   11/6/2019 10:00 AM Melanie Blake, PT [facility]   11/8/2019  9:00 AM Melanie Blake, PT [facility]   11/11/2019 10:00 AM Melanie Blake, PT [facility]   11/13/2019 10:00 AM Melanie Blake, PT [facility]   11/18/2019 10:00 AM Melanie Blake, PT [facility]   11/20/2019 10:00 AM Melanie Blake, PT [facility]   4/10/2020  8:30 AM Rika Jessica MD [address] [facility]

## 2019-10-21 ENCOUNTER — HOSPITAL ENCOUNTER (OUTPATIENT)
Dept: PHYSICAL THERAPY | Age: 64
Discharge: HOME | End: 2019-10-21
Payer: OTHER GOVERNMENT

## 2019-10-21 PROCEDURE — 97110 THERAPEUTIC EXERCISES: CPT

## 2019-10-21 PROCEDURE — 97140 MANUAL THERAPY 1/> REGIONS: CPT

## 2019-10-21 NOTE — PROGRESS NOTES
PHYSICAL THERAPY - DAILY TREATMENT NOTE    Patient Name: Vlad Christine        Date: 10/21/2019  : 1955   yes Patient  Verified  Visit #:   3   of   12  Insurance: Payor: Juan Haas / Plan: Ramandeep Heal / Product Type: Commerical /      In time: 9:05 Out time: 9:42   Total Treatment Time: 37     Medicare/[facility] Time Tracking (below)   Total Timed Codes (min):  na 1:1 Treatment Time:  na     TREATMENT AREA =  Low back pain [M54.5]  SUBJECTIVE  Pain Level (on 0 to 10 scale):  0  / 10   Medication Changes/New allergies or changes in medical history, any new surgeries or procedures?    no  If yes, update Summary List   Subjective Functional Status/Changes:  []  No changes reported     I was doing fine until this am.  When I bent forward to tie my shoes real quick, I had a sharp pain run across, but it went away. OBJECTIVE  27 min Therapeutic Exercise:  [x]  See flow sheet   Rationale:      increase ROM, increase strength and improve coordination to improve the patients ability to perform ADLs      8 min Manual Therapy: T/S mob, Shot gun tech, L IS ant inn quita, L 4-5 L ERS,    Rationale:      decrease pain, increase ROM and increase tissue extensibility to improve patient's ability to perform ADLs     Dry Needling Procedure Note     Dry Needle Session Number:  2     Procedure:    An intramuscular manual therapy (dry needling) and a neuro-muscular re-education treatment was done to deactivate myofascial trigger points, with a 15/30 gauge solid filament needle, under aseptic technique.     Indication(s):        [x] Muscle spasms      [] Myalgia/Myositis  [] Muscle cramps                                         [] Muscle imbalances         [] TMD (TMJ)          [] Myofascial pain & dysfunction                 [] Other: __     Chart reviewed for the following:  INano PT, have reviewed the medical history, summary list and precautions/contraindications for [id number] [employer] performed immediately prior to start of procedure:  9:07 (enter time the timeout was completed)  Balbina TRAMMELL PT, have performed the following reviews on Adair Nair prior to the start of the session:      [x] Patient was identified by name and date of birth    [x] Agreement on all muscles being treated was verified   [x] Purpose of dry needling, side effects, possible complications, risks and benefits were explained to the patient   [x] Procedure site(s) verified  [x] Patient was positioned for comfort and draped for privacy  [x] Informed Consent was signed (initial visit) and verified verbally (subsequent visits)  [x] Patient was instructed on the need to report the use of blood thinners and/or immunosuppressant medications  [x] How to respond to possible adverse effects of treatment  [x] Self treatment of post needling soreness: ice, heat (moist heat, heat wraps) and stretching  [x] Opportunity was given to ask any questions, all questions were answered     Treatment:  The following muscles were treated today:     Right: L/S Para/multifidus, Gm   Left: L/S Para/multifidus,      Patients response to todays treatment:   []  LTRs               []  Muscle Relaxation                      []  Pain Relief                     []  Decreased Tinnitus  []  Decreased HAs           []  Post needling soreness  []  Increased ROM              []  Other:        Actual needle insertion time is not billed       Billed With/As:   [] TE   [] TA   [] Neuro   [] Self Care Patient Education: [x] Review HEP    [] Progressed/Changed HEP based on:   [] positioning   [] body mechanics   [] transfers   [] heat/ice application    [] other:      Other Objective/Functional Measures:    Cont to demonstrate limited mid thoracic mobility noted.       Post Treatment Pain Level (on 0 to 10) scale:   0  / 10     ASSESSMENT  Assessment/Changes in Function:     Able to forward bend without increase in pain after man Rx     []  See Progress Note/Recertification   Patient will continue to benefit from skilled PT services to modify and progress therapeutic interventions, address functional mobility deficits and address ROM deficits to attain remaining goals.    Progress toward goals / Updated goals:    Progressing slowly with pain reduction      PLAN  [x]  Upgrade activities as tolerated yes Continue plan of care   []  Discharge due to :    []  Other:      Therapist: Meseret Macias PT    Date: 10/21/2019 Time: 6:28 AM     Future Appointments   Date Time Provider Ame Alba   10/21/2019  9:00 AM Amber Costa, PT [facility]   11/6/2019 10:00 AM Amber Costa, PT [facility]   11/8/2019  9:00 AM Amber Costa, PT [facility]   11/11/2019 10:00 AM Amber Costa, PT [facility]   11/13/2019 10:00 AM Amber Costa, PT [facility]   11/18/2019 10:00 AM Amber Costa, PT [facility]   11/20/2019 10:00 AM Amber Costa, PT [facility]   4/10/2020  8:30 AM Adria Ku MD [facility]

## 2019-11-06 ENCOUNTER — APPOINTMENT (OUTPATIENT)
Dept: PHYSICAL THERAPY | Age: 64
End: 2019-11-06
Payer: OTHER GOVERNMENT

## 2019-11-07 ENCOUNTER — OFFICE VISIT (OUTPATIENT)
Dept: FAMILY MEDICINE CLINIC | Age: 64
End: 2019-11-07

## 2019-11-07 VITALS
SYSTOLIC BLOOD PRESSURE: 119 MMHG | RESPIRATION RATE: 17 BRPM | TEMPERATURE: 97.4 F | HEART RATE: 71 BPM | HEIGHT: 77 IN | OXYGEN SATURATION: 97 % | DIASTOLIC BLOOD PRESSURE: 72 MMHG | BODY MASS INDEX: 28.1 KG/M2 | WEIGHT: 238 LBS

## 2019-11-07 DIAGNOSIS — R32 URINARY INCONTINENCE, UNSPECIFIED TYPE: ICD-10-CM

## 2019-11-07 DIAGNOSIS — I10 BENIGN ESSENTIAL HTN: ICD-10-CM

## 2019-11-07 DIAGNOSIS — R97.20 ELEVATED PROSTATE SPECIFIC ANTIGEN (PSA): ICD-10-CM

## 2019-11-07 DIAGNOSIS — J40 BRONCHITIS: Primary | ICD-10-CM

## 2019-11-07 DIAGNOSIS — G47.50 PARASOMNIA: ICD-10-CM

## 2019-11-07 RX ORDER — CLONAZEPAM 0.5 MG/1
0.5 TABLET ORAL
Qty: 90 TAB | Refills: 1 | Status: SHIPPED | OUTPATIENT
Start: 2019-11-07 | End: 2020-03-27

## 2019-11-07 RX ORDER — AMOXICILLIN AND CLAVULANATE POTASSIUM 500; 125 MG/1; MG/1
1 TABLET, FILM COATED ORAL 2 TIMES DAILY
Qty: 14 TAB | Refills: 0 | Status: SHIPPED | OUTPATIENT
Start: 2019-11-07 | End: 2019-11-14

## 2019-11-07 RX ORDER — AMOXICILLIN AND CLAVULANATE POTASSIUM 500; 125 MG/1; MG/1
1 TABLET, FILM COATED ORAL 2 TIMES DAILY
Qty: 14 TAB | Refills: 0 | Status: SHIPPED | OUTPATIENT
Start: 2019-11-07 | End: 2019-11-07 | Stop reason: SDUPTHER

## 2019-11-07 NOTE — PROGRESS NOTES
[facility] Dr. Scarlett Sevilla for spine and Dr Agapito Dacosta  Incontinence 10/11 urology PSA [address] [facility] [place] is a 61 y.o. male (: 1955) presenting to address:    Chief Complaint   Patient presents with    Cough     x 7 days     Medication Refill       Vitals:    19 0744   BP: 119/72   Pulse: 71   Resp: 17   Temp: 97.4 °F (36.3 °C)   TempSrc: Oral   SpO2: 97%   Weight: 238 lb (108 kg)   Height: 6' 5\" (1.956 m)   PainSc:   4   PainLoc: Throat       Hearing/Vision:   No exam data present    Learning Assessment:     Learning Assessment 2015   PRIMARY LEARNER Patient   HIGHEST LEVEL OF EDUCATION - PRIMARY LEARNER  SOME COLLEGE   PRIMARY LANGUAGE ENGLISH   LEARNER PREFERENCE PRIMARY LISTENING   ANSWERED BY patient   RELATIONSHIP SELF     Depression Screening:     3 most recent PHQ Screens 2019   Little interest or pleasure in doing things Not at all   Feeling down, depressed, irritable, or hopeless Not at all   Total Score PHQ 2 0     Fall Risk Assessment:     Fall Risk Assessment, last 12 mths 2018   Able to walk? Yes   Fall in past 12 months? Yes   Fall with injury? Yes   Number of falls in past 12 months 1   Fall Risk Score 2     Abuse Screening:     Abuse Screening Questionnaire 2019   Do you ever feel afraid of your partner? N   Are you in a relationship with someone who physically or mentally threatens you? N   Is it safe for you to go home? Y     Coordination of Care Questionaire:   1. Have you been to the ER, urgent care clinic since your last visit? Hospitalized since your last visit? No     2. Have you seen or consulted any other health care providers outside of the [address] [place] [address] since your last visit? Include any pap smears or colon screening. Yes, urology, spine, ortho     Advanced Directive:   1. Do you have an Advanced Directive? yes  2. Would you like information on Advanced Directives?    No       Health Maintenance Due   Topic Date Due    FOBT Q 1 YEAR, 18+  01/16/2019

## 2019-11-07 NOTE — PROGRESS NOTES
Chief Complaint   Patient presents with    Cough     x 7 days     Medication Refill       Assessment/Plan  1. Bronchitis  - amoxicillin-clavulanate (AUGMENTIN) 500-125 mg per tablet; Take 1 Tab by mouth two (2) times a day for 7 days. Dispense: 14 Tab; Refill: 0    2. Parasomnia  -refilled  - clonazePAM (KLONOPIN) 0.5 mg tablet; Take 1 Tab by mouth nightly. Max Daily Amount: 0.5 mg.  Dispense: 90 Tab; Refill: 1    3. Urinary incontinence, unspecified type  -f/u with urology  - CULTURE, URINE; Future  - URINALYSIS W/ RFLX MICROSCOPIC; Future    4. Elevated prostate specific antigen (PSA)  - PSA W/ REFLX FREE PSA; Future    5. Benign essential HTN  - CBC W/O DIFF; Future  - METABOLIC PANEL, COMPREHENSIVE; Future  - LIPID PANEL; Future      The plan was discussed with the patient. The patient verbalized understanding and is in agreement with the plan. All medication potential side effects were discussed with the patient. SUBJECTIVE:   Adair Nair is a 61 y.o. male who complains of 1 week h/o productive cough. +chills, subjective fevers. He feels worse today. 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 This takes care of his violent behavior during sleep. He c/o urinary incontinence. Is being treated by urology for bph with elevated psa (neg biopsies in past).     Review of Systems - ENT ROS: positive for - nasal congestion  Respiratory ROS: positive for - cough and sputum changes  Cardiovascular ROS: no chest pain or dyspnea on exertion  Gastrointestinal ROS: no abdominal pain, change in bowel habits, or black or bloody stools  Musculoskeletal ROS: negative  Neurological ROS: negative    Physical Examination:   Visit Vitals  /72 (BP 1 Location: Left arm, BP Patient Position: Sitting)   Pulse 71   Temp 97.4 °F (36.3 °C) (Oral)   Resp 17   Ht 6' 5\" (1.956 m)   Wt 238 lb (108 kg)   SpO2 97%   BMI 28.22 kg/m²       Constitutional: Well developed, nourished, no distress, alert   HENT: Exterior ears and tympanic membranes normal bilaterally. Supple neck. No thyromegaly or lymphadenopathy. Oropharynx clear and moist mucous membranes. Eyes: Conjunctiva normal. PERRL. CV: S1, S2.  RRR. No murmurs/rubs. Pulm: No abnormalities on inspection. Clear to auscultation bilaterally. No wheezing/rhonchi. Normal effort.              Pk Villanueva MD

## 2019-11-08 ENCOUNTER — HOSPITAL ENCOUNTER (OUTPATIENT)
Dept: PHYSICAL THERAPY | Age: 64
Discharge: HOME | End: 2019-11-08
Payer: OTHER GOVERNMENT

## 2019-11-08 PROCEDURE — 97140 MANUAL THERAPY 1/> REGIONS: CPT

## 2019-11-08 PROCEDURE — 97110 THERAPEUTIC EXERCISES: CPT

## 2019-11-08 NOTE — PROGRESS NOTES
PHYSICAL THERAPY - DAILY TREATMENT NOTE    Patient Name: Marisela Valladares        Date: 2019  : 1955   yes Patient  Verified  Visit #:      12  Insurance: Payor: Mya Tinsley / Plan: Kathe Sharp / Product Type: Commerical /      In time: 8:50 Out time: 9:30   Total Treatment Time: 40     Medicare/[facility] Time Tracking (below)   Total Timed Codes (min):  na 1:1 Treatment Time:  na     TREATMENT AREA =  Low back pain [M54.5]  SUBJECTIVE  Pain Level (on 0 to 10 scale):  0  / 10   Medication Changes/New allergies or changes in medical history, any new surgeries or procedures?    no  If yes, update Summary List   Subjective Functional Status/Changes:  []  No changes reported     No bad at all considering all the traveling I did           OBJECTIVE  30 min Therapeutic Exercise:  [x]  See flow sheet   Rationale:      increase ROM, increase strength and improve coordination to improve the patients ability to perform ADLs      8 min Manual Therapy: T/S mob, Shot gun tech, L IS ant inn quita, L 2-5 L ERS,    Rationale:      decrease pain, increase ROM and increase tissue extensibility to improve patient's ability to perform ADLs     Dry Needling Procedure Note     Dry Needle Session Number:  3     Procedure:    An intramuscular manual therapy (dry needling) and a neuro-muscular re-education treatment was done to deactivate myofascial trigger points, with a 15/30 gauge solid filament needle, under aseptic technique.     Indication(s):        [x] Muscle spasms      [] Myalgia/Myositis  [] Muscle cramps                                         [] Muscle imbalances         [] TMD (TMJ)          [] Myofascial pain & dysfunction                 [] Other: __     Chart reviewed for the following:  IGretchen PT, have reviewed the medical history, summary list and precautions/contraindications for Memo Walton.     TIME OUT performed immediately prior to start of procedure:  8:52 (enter time the timeout was completed)  Gretchen TRAMMELL, PT, have performed the following reviews on Memo Walton prior to the start of the session:      [x] Patient was identified by name and date of birth    [x] Agreement on all muscles being treated was verified   [x] Purpose of dry needling, side effects, possible complications, risks and benefits were explained to the patient   [x] Procedure site(s) verified  [x] Patient was positioned for comfort and draped for privacy  [x] Informed Consent was signed (initial visit) and verified verbally (subsequent visits)  [x] Patient was instructed on the need to report the use of blood thinners and/or immunosuppressant medications  [x] How to respond to possible adverse effects of treatment  [x] Self treatment of post needling soreness: ice, heat (moist heat, heat wraps) and stretching  [x] Opportunity was given to ask any questions, all questions were answered     Treatment:  The following muscles were treated today:     Right: L/S Para/multifidus,    Left: L/S Para/multifidus,      Patients response to todays treatment:   []  LTRs               []  Muscle Relaxation                      []  Pain Relief                     []  Decreased Tinnitus  []  Decreased HAs           []  Post needling soreness  []  Increased ROM              []  Other:        Actual needle insertion time is not billed          Billed With/As:   [] TE   [] TA   [] Neuro   [] Self Care Patient Education: [x] Review HEP    [] Progressed/Changed HEP based on:   [] positioning   [] body mechanics   [] transfers   [] heat/ice application    [] other:      Other Objective/Functional Measures:    Cont to have increased soft tissue tension noted at L para     Post Treatment Pain Level (on 0 to 10) scale:   0  / 10     ASSESSMENT  Assessment/Changes in Function:     Little difficulty with rhythmic stab in QP LE     []  See Progress Note/Recertification   Patient will continue to benefit from skilled PT services to modify and progress therapeutic interventions, address functional mobility deficits and address ROM deficits to attain remaining goals.    Progress toward goals / Updated goals:    Slowly progressing with stability     PLAN  [x]  Upgrade activities as tolerated yes Continue plan of care   []  Discharge due to :    []  Other:      Therapist: Pako Marin PT    Date: 11/8/2019 Time: 6:28 AM     Future Appointments   Date Time Provider Ame Alba   11/8/2019  9:00 AM Fritz Guerrero, PT [facility]   11/11/2019 10:00 AM Fritz Guerrero, PT [facility]   11/13/2019 10:00 AM Fritz Guerrero, PT [facility]   11/18/2019 10:00 AM Fritz Guerrero, PT [facility]   11/20/2019 10:00 AM Fritz Guerrero, PT [facility]   4/10/2020  8:30 AM Anjana Garcia MD [id number] [facility]

## 2019-11-11 ENCOUNTER — HOSPITAL ENCOUNTER (OUTPATIENT)
Dept: PHYSICAL THERAPY | Age: 64
Discharge: HOME | End: 2019-11-11
Payer: OTHER GOVERNMENT

## 2019-11-11 PROCEDURE — 97140 MANUAL THERAPY 1/> REGIONS: CPT

## 2019-11-11 PROCEDURE — 97110 THERAPEUTIC EXERCISES: CPT

## 2019-11-11 NOTE — PROGRESS NOTES
PHYSICAL THERAPY - DAILY TREATMENT NOTE    Patient Name: Donavan Merritt        Date: 2019  : 1955   yes Patient  Verified  Visit #:      12  Insurance: Payor: Barby Landers / Plan: Luanne Jonas / Product Type: Commerical /      In time: 10:00 Out time: 10:35   Total Treatment Time: 35     Medicare/[facility] Time Tracking (below)   Total Timed Codes (min):  na 1:1 Treatment Time:  na     TREATMENT AREA =  Low back pain [M54.5]  SUBJECTIVE  Pain Level (on 0 to 10 scale):  0  / 10   Medication Changes/New allergies or changes in medical history, any new surgeries or procedures?    no  If yes, update Summary List   Subjective Functional Status/Changes:  []  No changes reported     I did ok over the weekend. No twinge          OBJECTIVE  25 min Therapeutic Exercise:  [x]  See flow sheet   Rationale:      increase ROM, increase strength and improve coordination to improve the patients ability to perform ADLs      8 min Manual Therapy: T/S mob, Shot gun tech, L IS ant inn quita, L 2-5 L ERS,    Rationale:      decrease pain, increase ROM and increase tissue extensibility to improve patient's ability to perform ADLs     Dry Needling Procedure Note     Dry Needle Session Number:  4     Procedure:    An intramuscular manual therapy (dry needling) and a neuro-muscular re-education treatment was done to deactivate myofascial trigger points, with a 15/30 gauge solid filament needle, under aseptic technique.     Indication(s):        [x] Muscle spasms      [] Myalgia/Myositis  [] Muscle cramps                                         [] Muscle imbalances         [] TMD (TMJ)          [] Myofascial pain & dysfunction                 [] Other: __     Chart reviewed for the following:  Gretchen TRAMMELL PT, have reviewed the medical history, summary list and precautions/contraindications for Memo Walton.     TIME OUT performed immediately prior to start of procedure:  10:02 (enter time the timeout was completed)  Gretchen TRAMMELL PT, have performed the following reviews on Memo Walton prior to the start of the session:      [x] Patient was identified by name and date of birth    [x] Agreement on all muscles being treated was verified   [x] Purpose of dry needling, side effects, possible complications, risks and benefits were explained to the patient   [x] Procedure site(s) verified  [x] Patient was positioned for comfort and draped for privacy  [x] Informed Consent was signed (initial visit) and verified verbally (subsequent visits)  [x] Patient was instructed on the need to report the use of blood thinners and/or immunosuppressant medications  [x] How to respond to possible adverse effects of treatment  [x] Self treatment of post needling soreness: ice, heat (moist heat, heat wraps) and stretching  [x] Opportunity was given to ask any questions, all questions were answered     Treatment:  The following muscles were treated today:     Right: L/S Para/multifidus,    Left: L/S Para/multifidus,      Patients response to todays treatment:   []  LTRs               []  Muscle Relaxation                      []  Pain Relief                     []  Decreased Tinnitus  []  Decreased HAs           []  Post needling soreness  []  Increased ROM              []  Other:        Actual needle insertion time is not billed          Billed With/As:   [] TE   [] TA   [] Neuro   [] Self Care Patient Education: [x] Review HEP    [] Progressed/Changed HEP based on:   [] positioning   [] body mechanics   [] transfers   [] heat/ice application    [] other:      Other Objective/Functional Measures:    Cont to have increased soft tissue tension noted at L para     Post Treatment Pain Level (on 0 to 10) scale:   0  / 10     ASSESSMENT  Assessment/Changes in Function:     Symmetrical ROt ROM noted after man Rx     []  See Progress Note/Recertification   Patient will continue to benefit from skilled PT services to modify and progress therapeutic interventions, address functional mobility deficits and address ROM deficits to attain remaining goals.    Progress toward goals / Updated goals:    Progressing slowly with stability     PLAN  [x]  Upgrade activities as tolerated yes Continue plan of care   []  Discharge due to :    []  Other:      Therapist: Amos Ge PT    Date: 11/11/2019 Time: 6:29 AM     Future Appointments   Date Time Provider Ame Alba   11/11/2019 10:00 AM Nikia Bragg  [facility]   11/13/2019 10:00 AM Nikia Bragg  [facility]   11/18/2019 10:00 AM Nikia Bragg  [facility]   11/20/2019 10:00 AM Nikia Bragg  [facility]   4/10/2020  8:30 AM Steven Louis MD [address] [facility]

## 2019-11-13 ENCOUNTER — HOSPITAL ENCOUNTER (OUTPATIENT)
Dept: PHYSICAL THERAPY | Age: 64
Discharge: HOME | End: 2019-11-13
Payer: OTHER GOVERNMENT

## 2019-11-13 PROCEDURE — 97110 THERAPEUTIC EXERCISES: CPT

## 2019-11-13 PROCEDURE — 97140 MANUAL THERAPY 1/> REGIONS: CPT

## 2019-11-13 NOTE — PROGRESS NOTES
PHYSICAL THERAPY - DAILY TREATMENT NOTE    Patient Name: Yoselin Armenta        Date: 2019  : 1955   yes Patient  Verified  Visit #:      12  Insurance: Payor: [employer] / Plan: Wyatt Caller / Product Type: Commerical /      In time: 10:00 Out time: 10:35   Total Treatment Time: 35     Medicare/[employer] Time Tracking (below)   Total Timed Codes (min):  na 1:1 Treatment Time:  na     TREATMENT AREA =  Low back pain [M54.5]  SUBJECTIVE  Pain Level (on 0 to 10 scale):  0  / 10   Medication Changes/New allergies or changes in medical history, any new surgeries or procedures?    no  If yes, update Summary List   Subjective Functional Status/Changes:  []  No changes reported     Its been doing good. OBJECTIVE  25 min Therapeutic Exercise:  [x]  See flow sheet   Rationale:      increase ROM, increase strength and improve coordination to improve the patients ability to perform ADLs      8 min Manual Therapy: T/S mob, Shot gun tech, L IS ant inn quita, L 2-L ERS,    Rationale:      decrease pain, increase ROM and increase tissue extensibility to improve patient's ability to perform ADLs     Dry Needling Procedure Note     Dry Needle Session Number:  5   Procedure:    An intramuscular manual therapy (dry needling) and a neuro-muscular re-education treatment was done to deactivate myofascial trigger points, with a 15/30 gauge solid filament needle, under aseptic technique.     Indication(s):        [x] Muscle spasms      [] Myalgia/Myositis  [] Muscle cramps                                         [] Muscle imbalances         [] TMD (TMJ)          [] Myofascial pain & dysfunction                 [] Other: __     Chart reviewed for the following:  IGretchen PT, have reviewed the medical history, summary list and precautions/contraindications for Memo Walton.     TIME OUT performed immediately prior to start of procedure:  10:02 (enter time the timeout was completed)  Oxana Yuan PT, have performed the following reviews on Memo Walton prior to the start of the session:      [x] Patient was identified by name and date of birth    [x] Agreement on all muscles being treated was verified   [x] Purpose of dry needling, side effects, possible complications, risks and benefits were explained to the patient   [x] Procedure site(s) verified  [x] Patient was positioned for comfort and draped for privacy  [x] Informed Consent was signed (initial visit) and verified verbally (subsequent visits)  [x] Patient was instructed on the need to report the use of blood thinners and/or immunosuppressant medications  [x] How to respond to possible adverse effects of treatment  [x] Self treatment of post needling soreness: ice, heat (moist heat, heat wraps) and stretching  [x] Opportunity was given to ask any questions, all questions were answered     Treatment:  The following muscles were treated today:     Right: L/S Para/multifidus,    Left: L/S Para/multifidus,      Patients response to todays treatment:   []  LTRs               []  Muscle Relaxation                      []  Pain Relief                     []  Decreased Tinnitus  []  Decreased HAs           []  Post needling soreness  []  Increased ROM              []  Other:        Actual needle insertion time is not billed          Billed With/As:   [] TE   [] TA   [] Neuro   [] Self Care Patient Education: [x] Review HEP    [] Progressed/Changed HEP based on:   [] positioning   [] body mechanics   [] transfers   [] heat/ice application    [] other:      Other Objective/Functional Measures:    Cont to have slight TTP at L2     Post Treatment Pain Level (on 0 to 10) scale:   0  / 10     ASSESSMENT  Assessment/Changes in Function:     Slight instability noted with QP UE/LE     []  See Progress Note/Recertification   Patient will continue to benefit from skilled PT services to modify and progress therapeutic interventions, address functional mobility deficits and address ROM deficits to attain remaining goals.    Progress toward goals / Updated goals:    Progressing with pain reduction      PLAN  [x]  Upgrade activities as tolerated yes Continue plan of care   []  Discharge due to :    []  Other:      Therapist: Jose Schuster PT    Date: 11/13/2019 Time: 6:34 AM     Future Appointments   Date Time Provider Ame Alba   11/13/2019 10:00 AM Megan Rhodes, PT [facility]   11/18/2019 10:00 AM Megan Rhodes PT [facility]   11/20/2019 10:00 AM Megan Rhodes PT [facility]   4/10/2020  8:30 AM Piyush Hartmann MD [address] [facility]

## 2019-11-18 ENCOUNTER — HOSPITAL ENCOUNTER (OUTPATIENT)
Dept: PHYSICAL THERAPY | Age: 64
Discharge: HOME | End: 2019-11-18
Payer: OTHER GOVERNMENT

## 2019-11-18 PROCEDURE — 97110 THERAPEUTIC EXERCISES: CPT

## 2019-11-18 PROCEDURE — 97140 MANUAL THERAPY 1/> REGIONS: CPT

## 2019-11-18 NOTE — PROGRESS NOTES
PHYSICAL THERAPY - DAILY TREATMENT NOTE    Patient Name: Stefan Harp        Date: 2019  : 1955   yes Patient  Verified  Visit #:     Insurance: Payor: Deandre Giang / Plan: Manuel Che / Product Type: Commerical /      In time: 10:00 Out time: 10:35   Total Treatment Time: 35     Medicare/[employer] Time Tracking (below)   Total Timed Codes (min):  na 1:1 Treatment Time:  na     TREATMENT AREA =  Low back pain [M54.5]  SUBJECTIVE  Pain Level (on 0 to 10 scale):  0  / 10   Medication Changes/New allergies or changes in medical history, any new surgeries or procedures?    no  If yes, update Summary List   Subjective Functional Status/Changes:  []  No changes reported     Its doing pretty good. OBJECTIVE  25 min Therapeutic Exercise:  [x]  See flow sheet   Rationale:      increase ROM, increase strength and improve coordination to improve the patients ability to perform ADLs      8 min Manual Therapy: T/S mob,  L 2-5L ERS,    Rationale:      decrease pain, increase ROM and increase tissue extensibility to improve patient's ability to perform ADLs     Dry Needling Procedure Note     Dry Needle Session Number:  6  Procedure:    An intramuscular manual therapy (dry needling) and a neuro-muscular re-education treatment was done to deactivate myofascial trigger points, with a 15/30 gauge solid filament needle, under aseptic technique.     Indication(s):        [x] Muscle spasms      [] Myalgia/Myositis  [] Muscle cramps                                         [] Muscle imbalances         [] TMD (TMJ)          [] Myofascial pain & dysfunction                 [] Other: __     Chart reviewed for the following:  Gretchen TRAMMELL, PT, have reviewed the medical history, summary list and precautions/contraindications for Memo Walton.     TIME OUT performed immediately prior to start of procedure:  10:07(enter time the timeout was completed)  Sherice Coleman PT, have performed the following reviews on Memo ZAMUDIO Anton prior to the start of the session:      [x] Patient was identified by name and date of birth    [x] Agreement on all muscles being treated was verified   [x] Purpose of dry needling, side effects, possible complications, risks and benefits were explained to the patient   [x] Procedure site(s) verified  [x] Patient was positioned for comfort and draped for privacy  [x] Informed Consent was signed (initial visit) and verified verbally (subsequent visits)  [x] Patient was instructed on the need to report the use of blood thinners and/or immunosuppressant medications  [x] How to respond to possible adverse effects of treatment  [x] Self treatment of post needling soreness: ice, heat (moist heat, heat wraps) and stretching  [x] Opportunity was given to ask any questions, all questions were answered     Treatment:  The following muscles were treated today:     Right: L/S Para/multifidus,    Left: L/S Para/multifidus,      Patients response to todays treatment:   []  LTRs               []  Muscle Relaxation                      []  Pain Relief                     []  Decreased Tinnitus  []  Decreased HAs           []  Post needling soreness  []  Increased ROM              []  Other:        Actual needle insertion time is not billed          Billed With/As:   [] TE   [] TA   [] Neuro   [] Self Care Patient Education: [x] Review HEP    [] Progressed/Changed HEP based on:   [] positioning   [] body mechanics   [] transfers   [] heat/ice application    [] other:      Other Objective/Functional Measures:    No iliosacral innominate noted today     Post Treatment Pain Level (on 0 to 10) scale:   0  / 10     ASSESSMENT  Assessment/Changes in Function:     Symmetrical and full t/s Rot noted today     []  See Progress Note/Recertification   Patient will continue to benefit from skilled PT services to modify and progress therapeutic interventions, address functional mobility deficits and address ROM deficits to attain remaining goals.    Progress toward goals / Updated goals:    Slowly progressing with stability     PLAN  [x]  Upgrade activities as tolerated yes Continue plan of care   []  Discharge due to :    []  Other:      Therapist: Husam Obregno PT    Date: 11/18/2019 Time: 7:21 AM     Future Appointments   Date Time Provider Ame Alba   11/18/2019 10:00 AM Rajesh Galvez, PT [facility]   11/20/2019 10:00 AM Rajesh Galvez PT [facility]   4/10/2020  8:30 AM MD Alexander Painter

## 2019-11-19 ENCOUNTER — HOSPITAL ENCOUNTER (OUTPATIENT)
Dept: LAB | Age: 64
Discharge: HOME | End: 2019-11-19
Payer: OTHER GOVERNMENT

## 2019-11-19 DIAGNOSIS — R32 URINARY INCONTINENCE, UNSPECIFIED TYPE: ICD-10-CM

## 2019-11-19 LAB
APPEARANCE UR: CLEAR
BILIRUB UR QL: NEGATIVE
COLOR UR: YELLOW
GLUCOSE UR STRIP.AUTO-MCNC: NEGATIVE MG/DL
HGB UR QL STRIP: NEGATIVE
KETONES UR QL STRIP.AUTO: NEGATIVE MG/DL
LEUKOCYTE ESTERASE UR QL STRIP.AUTO: NEGATIVE
NITRITE UR QL STRIP.AUTO: NEGATIVE
PH UR STRIP: 8 [PH] (ref 5–8)
PROT UR STRIP-MCNC: NEGATIVE MG/DL
SP GR UR REFRACTOMETRY: 1.01 (ref 1–1.03)
UROBILINOGEN UR QL STRIP.AUTO: 0.2 EU/DL (ref 0.2–1)

## 2019-11-19 PROCEDURE — 87086 URINE CULTURE/COLONY COUNT: CPT

## 2019-11-19 PROCEDURE — 81003 URINALYSIS AUTO W/O SCOPE: CPT

## 2019-11-19 NOTE — PROGRESS NOTES
[address] [place] [address], [address], [address] [facility] - Phone: [phone]  Fax: [phone]  PROGRESS NOTE  Patient Name: Marni Chi : 1955   Treatment/Medical Diagnosis: Low back pain [M54.5]   Referral Source: Carla Both, DO     Date of Initial Visit: 10/17/19 Attended Visits: 7 Missed Visits: Alex Orr has been seen at our clinic 1-2x/wk for a total of 7 visits. Pt treatment has consisted of  therapeutic exercise for thoracic ROM, hip/core strengthening, and manual therapy(jt mobilization and deep tissue mobilization)  CURRENT STATUS  Pt has had a good tolerance to physical therapy treatment. He reports improved ability to perform ADLs and has only complained of minor occasional pain in his low back. He also presents with improved level of stability and is now able to maintain neutral spine with 1/2 kneeling position. Goal/Measure of Progress Goal Met? 1. Decrease Max pain by 50-75% to assist with ADLs   Status at last Eval: 10/10 Current Status: 0/10 yes   2. Increase FOTO score to 67 % show functional improvement   Status at last Eval: 57% Current Status: 79% yes   3. Will rate >/= +5 on Global Rating of Change and be prepared to DC to HEP. Status at last Eval: na Current Status: +6 yes     New Goals to be achieved in __4__  weeks:  1. Pt will report 50% decrease in twinge in his lower lumbar to assist with ADLs   2.     3.     RECOMMENDATIONS    Specifics: 2-3x/wk x 4 more wks  If you have any questions/comments please contact us directly at ([phone]. Thank you for allowing us to assist in the care of your patient.     Therapist Signature: Daniel Mccloud, DPT, OCS, SCS, CSCS Date: 2019     Time: 2:36 PM   NOTE TO PHYSICIAN:  PLEASE COMPLETE THE ORDERS BELOW AND FAX TO   [facility] Physical [facility]: [phone]  If you are unable to process this request in 24 hours please contact our office: [phone]    ___ I have read the above report and request that my patient continue as recommended.   ___ I have read the above report and request that my patient continue therapy with the following changes/special instructions:_________________________________________________________   ___ I have read the above report and request that my patient be discharged from therapy.      Physician Signature:        Date:       Time:

## 2019-11-20 ENCOUNTER — HOSPITAL ENCOUNTER (OUTPATIENT)
Dept: PHYSICAL THERAPY | Age: 64
Discharge: HOME | End: 2019-11-20
Payer: OTHER GOVERNMENT

## 2019-11-20 PROCEDURE — 97110 THERAPEUTIC EXERCISES: CPT

## 2019-11-20 PROCEDURE — 97140 MANUAL THERAPY 1/> REGIONS: CPT

## 2019-11-20 NOTE — PROGRESS NOTES
PHYSICAL THERAPY - DAILY TREATMENT NOTE    Patient Name: Eugene Current        Date: 2019  : 1955   yes Patient  Verified  Visit #:     Insurance: Payor: Ángela Jimmysintia / Plan: Lonell Coma / Product Type: Commerical /      In time: 10:00 Out time: 10:45   Total Treatment Time: 45     Medicare/[facility] Time Tracking (below)   Total Timed Codes (min):  na 1:1 Treatment Time:  na     TREATMENT AREA =  Low back pain [M54.5]  SUBJECTIVE  Pain Level (on 0 to 10 scale):  0  / 10   Medication Changes/New allergies or changes in medical history, any new surgeries or procedures?    no  If yes, update Summary List   Subjective Functional Status/Changes:  []  No changes reported     Just a minot twinge. OBJECTIVE  30 min Therapeutic Exercise:  [x]?   See flow sheet   Rationale:      increase ROM, increase strength and improve coordination to improve the patients ability to perform ADLs      8 min Manual Therapy: T/S mob,  L 2-5L ERS,    Rationale:      decrease pain, increase ROM and increase tissue extensibility to improve patient's ability to perform ADLs     Dry Needling Procedure Note     Dry Needle Session Number:  7  Procedure:    An intramuscular manual therapy (dry needling) and a neuro-muscular re-education treatment was done to deactivate myofascial trigger points, with a 15/30 gauge solid filament needle, under aseptic technique.     Indication(s):        [x]? Muscle spasms      []? Myalgia/Myositis  []? Muscle cramps                                         []? Muscle imbalances         []? TMD (TMJ)          []? Myofascial pain & dysfunction                 []? Other: __     Chart reviewed for the following:  IGretchen, PT, have reviewed the medical history, summary list and precautions/contraindications for Memo Walton.     TIME OUT performed immediately prior to start of procedure:  10:07(enter time the timeout was completed)  Edna Moura PT, have performed the following reviews on Memo Walton prior to the start of the session:      [x]? Patient was identified by name and date of birth    [x]? Agreement on all muscles being treated was verified   [x]? Purpose of dry needling, side effects, possible complications, risks and benefits were explained to the patient   [x]? Procedure site(s) verified  [x]? Patient was positioned for comfort and draped for privacy  [x]? Informed Consent was signed (initial visit) and verified verbally (subsequent visits)  [x]?  Patient was instructed on the need to report the use of blood thinners and/or immunosuppressant medications  [x]? How to respond to possible adverse effects of treatment  [x]?  Self treatment of post needling soreness: ice, heat (moist heat, heat wraps) and stretching  [x]?  Opportunity was given to ask any questions, all questions were answered     Treatment:  The following muscles were treated today:     Right: L/S Para/multifidus,    Left: L/S Para/multifidus, psoas      Patients response to todays treatment:   []?  LTRs               []?  Muscle Relaxation                      []?  Pain Relief                     []?  Decreased Tinnitus  []?  Decreased HAs           []?  Post needling soreness  []?  Increased ROM              []?  Other:        Actual needle insertion time is not billed       Billed With/As:   [] TE   [] TA   [] Neuro   [] Self Care Patient Education: [x] Review HEP    [] Progressed/Changed HEP based on:   [] positioning   [] body mechanics   [] transfers   [] heat/ice application    [] other:      Other Objective/Functional Measures:    FOTO = 79  GROC = +6     Post Treatment Pain Level (on 0 to 10) scale:   0  / 10     ASSESSMENT  Assessment/Changes in Function:     Limited L hip ext noted     []  See Progress Note/Recertification   Patient will continue to benefit from skilled PT services to modify and progress therapeutic interventions, address functional mobility deficits and address ROM deficits to attain remaining goals.    Progress toward goals / Updated goals:    Met LTG #2 and #3     PLAN  [x]  Upgrade activities as tolerated yes Continue plan of care   []  Discharge due to :    []  Other:      Therapist: Rod Diaz PT    Date: 11/20/2019 Time: 6:29 AM     Future Appointments   Date Time Provider Ame Alba   11/20/2019 10:00 AM Carina Cerda PT [facility]   4/10/2020  8:30 AM Katarina Nielsen MD [id number] [facility]

## 2019-11-21 LAB
BACTERIA SPEC CULT: NORMAL
SERVICE CMNT-IMP: NORMAL

## 2019-11-26 ENCOUNTER — HOSPITAL ENCOUNTER (OUTPATIENT)
Dept: PHYSICAL THERAPY | Age: 64
Discharge: HOME | End: 2019-11-26
Payer: OTHER GOVERNMENT

## 2019-11-26 PROCEDURE — 97110 THERAPEUTIC EXERCISES: CPT

## 2019-11-26 PROCEDURE — 97140 MANUAL THERAPY 1/> REGIONS: CPT

## 2019-11-26 NOTE — PROGRESS NOTES
PHYSICAL THERAPY - DAILY TREATMENT NOTE    Patient Name: Kenyatta Glover        Date: 2019  : 1955   yes Patient  Verified  Visit #:     Insurance: Payor: Sánchez Rivera / Plan: Clement Betancourt / Product Type: Commerical /      In time: 10:00 Out time: 10:30   Total Treatment Time: 30     Medicare/[facility] Time Tracking (below)   Total Timed Codes (min):  na 1:1 Treatment Time:  na     TREATMENT AREA =  Low back pain [M54.5]  SUBJECTIVE  Pain Level (on 0 to 10 scale):  0  / 10   Medication Changes/New allergies or changes in medical history, any new surgeries or procedures?    no  If yes, update Summary List   Subjective Functional Status/Changes:  []  No changes reported     Just a little stiff, but not bad           OBJECTIVE  20 min Therapeutic Exercise:  [x]? ?  See flow sheet   Rationale:      increase ROM, increase strength and improve coordination to improve the patients ability to perform ADLs      8 min Manual Therapy: T/S mob,  L 2-5L ERS,    Rationale:      decrease pain, increase ROM and increase tissue extensibility to improve patient's ability to perform ADLs     Dry Needling Procedure Note     Dry Needle Session Number:  8  Procedure: An intramuscular manual therapy (dry needling) and a neuro-muscular re-education treatment was done to deactivate myofascial trigger points, with a 15/30 gauge solid filament needle, under aseptic technique.     Indication(s):        [x]? ? Muscle spasms      []? ? Myalgia/Myositis  []? ? Muscle cramps                                         []? ? Muscle imbalances         []? ? TMD (TMJ)          []? ? Myofascial pain & dysfunction                 []? ? Other: __     Chart reviewed for the following:  IGretchen PT, have reviewed the medical history, summary list and precautions/contraindications for Memo Walton.     TIME OUT performed immediately prior to start of procedure:  10:02(enter time the timeout was completed)  Susan Gallegos PT, have performed the following reviews on Memo Walton prior to the start of the session:      [x]? ?Mildred Causey was identified by name and date of birth    [x]? ? Agreement on all muscles being treated was verified   [x]? ? Purpose of dry needling, side effects, possible complications, risks and benefits were explained to the patient   [x]? ? Procedure site(s) verified  [x]? ? Patient was positioned for comfort and draped for privacy  [x]? ? Informed Consent was signed (initial visit) and verified verbally (subsequent visits)  [x]? ? Patient was instructed on the need to report the use of blood thinners and/or immunosuppressant medications  [x]? ? How to respond to possible adverse effects of treatment  [x]? ? Self treatment of post needling soreness: ice, heat (moist heat, heat wraps) and stretching  [x]? ? Opportunity was given to ask any questions, all questions were answered     Treatment:  The following muscles were treated today:     Right: L/S Para/multifidus,    Left: L/S Para/multifidus, psoas      Patients response to todays treatment:   []??  LTRs               []? ?  Muscle Relaxation                      []? ?  Pain Relief                     []? ?  Decreased Tinnitus  []? ?  Decreased HAs           []? ?  Post needling soreness  []? ?  Increased UZL              []? ?  Other:        Actual needle insertion time is not billed          Billed With/As:   [] TE   [] TA   [] Neuro   [] Self Care Patient Education: [x] Review HEP    [] Progressed/Changed HEP based on:   [] positioning   [] body mechanics   [] transfers   [] heat/ice application    [] other:      Other Objective/Functional Measures:    Cont to have increased soft tissue tension noted at L para     Post Treatment Pain Level (on 0 to 10) scale:   0  / 10     ASSESSMENT  Assessment/Changes in Function:     Symmetrical Rot ROM noted after man Rx     []  See Progress Note/Recertification   Patient will continue to benefit from skilled PT services to modify and progress therapeutic interventions, address functional mobility deficits, address ROM deficits and address strength deficits to attain remaining goals.    Progress toward goals / Updated goals:    Slowly progressing with stability     PLAN  [x]  Upgrade activities as tolerated yes Continue plan of care   []  Discharge due to :    []  Other:      Therapist: Dannielle Stanton PT    Date: 11/26/2019 Time: 9:47 AM     Future Appointments   Date Time Provider Ame Alba   11/26/2019 10:00 AM Marcy Figueroa, PT [facility]   12/11/2019 11:00 AM Marcy Figueroa, PT [facility]   12/13/2019 10:00 AM Marcy Figueroa, PT [facility]   12/16/2019 10:00 AM Marcy Figueroa, PT [facility]   12/18/2019  9:00 AM Marcy Figueroa, PT [facility]   12/23/2019 10:00 AM Marcy Figueroa, PT [facility]   12/26/2019 10:30 AM Marcy Figueroa PT [facility]   12/30/2019 10:30 AM Marcy Figueroa PT [facility]   4/10/2020  8:30 AM Fritz Fuentes MD [id number] [facility]

## 2019-12-11 ENCOUNTER — HOSPITAL ENCOUNTER (OUTPATIENT)
Dept: PHYSICAL THERAPY | Age: 64
Discharge: HOME | End: 2019-12-11
Payer: OTHER GOVERNMENT

## 2019-12-11 DIAGNOSIS — I10 BENIGN ESSENTIAL HTN: ICD-10-CM

## 2019-12-11 PROCEDURE — 97110 THERAPEUTIC EXERCISES: CPT

## 2019-12-11 PROCEDURE — 97140 MANUAL THERAPY 1/> REGIONS: CPT

## 2019-12-11 RX ORDER — LISINOPRIL 5 MG/1
TABLET ORAL
Qty: 90 TAB | Refills: 4 | Status: SHIPPED | OUTPATIENT
Start: 2019-12-11 | End: 2021-01-04 | Stop reason: SDUPTHER

## 2019-12-11 NOTE — PROGRESS NOTES
PHYSICAL THERAPY - DAILY TREATMENT NOTE    Patient Name: Lamar Glasgow        Date: 2019  : 1955   yes Patient  Verified  Visit #:   10   of   12  Insurance: Payor: Hair Motley / Plan: Annalisa Wilde / Product Type: Commerical /      In time: 10:45 Out time: 11:15   Total Treatment Time: 30     Medicare/[facility] Time Tracking (below)   Total Timed Codes (min):  na 1:1 Treatment Time:  na     TREATMENT AREA =  Low back pain [M54.5]  SUBJECTIVE  Pain Level (on 0 to 10 scale):  0  / 10   Medication Changes/New allergies or changes in medical history, any new surgeries or procedures?    no  If yes, update Summary List   Subjective Functional Status/Changes:  []  No changes reported     Its been pretty good. I havent really had much of pain           OBJECTIVE  20 min Therapeutic Exercise:  [x]? ??  See flow sheet   Rationale:      increase ROM, increase strength and improve coordination to improve the patients ability to perform ADLs      8 min Manual Therapy: T/S mob,  L 1-3L FRS,    Rationale:      decrease pain, increase ROM and increase tissue extensibility to improve patient's ability to perform ADLs     Dry Needling Procedure Note     Dry Needle Session Number:  9  Procedure: An intramuscular manual therapy (dry needling) and a neuro-muscular re-education treatment was done to deactivate myofascial trigger points, with a 15/30 gauge solid filament needle, under aseptic technique.     Indication(s):        [x]? ?? Muscle spasms      []? ?? Myalgia/Myositis  []??? Muscle cramps                                         []? ?? Muscle imbalances         []? ?? TMD (TMJ)          []? ?? Myofascial pain & dysfunction                 []? ?? Other: __     Chart reviewed for the following:  IGretchen, PT, have reviewed the medical history, summary list and precautions/contraindications for Memo Walton.     TIME OUT performed immediately prior to start of procedure:  10:47(enter time the timeout was completed)  Saad Gilbert PT, have performed the following reviews on Memo Walton prior to the start of the session:      [x]? ?? Patient was identified by name and date of birth    [x]? ?? Agreement on all muscles being treated was verified   [x]? ?? Purpose of dry needling, side effects, possible complications, risks and benefits were explained to the patient   [x]? ?? Procedure site(s) verified  [x]? ?? Patient was positioned for comfort and draped for privacy  [x]? ?? Informed Consent was signed (initial visit) and verified verbally (subsequent visits)  [x]??? Patient was instructed on the need to report the use of blood thinners and/or immunosuppressant medications  [x]? ?? How to respond to possible adverse effects of treatment  [x]??? Self treatment of post needling soreness: ice, heat (moist heat, heat wraps) and stretching  [x]? ?? Opportunity was given to ask any questions, all questions were answered     Treatment:  The following muscles were treated today:     Right: L/S Para/multifidus,    Left: L/S Para/multifidus, psoas      Patients response to todays treatment:   []???  LTRs               []? ??  Muscle Relaxation                      []? ??  Pain Relief                     []? ??  Decreased Tinnitus  []? ??  Decreased HAs           []? ??  Post needling soreness  []???  Increased ROM              []???  Other:        Actual needle insertion time is not billed             Billed With/As:   [] TE   [] TA   [] Neuro   [] Self Care Patient Education: [x] Review HEP    [] Progressed/Changed HEP based on:   [] positioning   [] body mechanics   [] transfers   [] heat/ice application    [] other:      Other Objective/Functional Measures:    Good stability with all ex without increase in pain      Post Treatment Pain Level (on 0 to 10) scale:   0  / 10     ASSESSMENT  Assessment/Changes in Function:     noe the progression of ex well without increase in pain      []  See Progress Note/Recertification Patient will continue to benefit from skilled PT services to modify and progress therapeutic interventions, address functional mobility deficits and address ROM deficits to attain remaining goals.    Progress toward goals / Updated goals:    Progressing slowly with stability     PLAN  [x]  Upgrade activities as tolerated yes Continue plan of care   []  Discharge due to :    []  Other:      Therapist: Katina Turpin PT    Date: 12/11/2019 Time: 6:31 AM     Future Appointments   Date Time Provider Ame Alba   12/11/2019 11:00 AM Fayetta Clonts, PT [facility]   12/13/2019 10:00 AM Fayetta Clonts, PT [facility]   12/16/2019 10:00 AM Fayetta Clonts, PT [facility]   12/18/2019  9:00 AM Fayetta Clonts, PT [facility]   12/23/2019 10:00 AM Fayetta Clonts, PT [facility]   12/26/2019 10:30 AM Fayetta Clonts, PT [facility]   12/30/2019 10:30 AM Fayetta Clonts, PT [facility]   4/10/2020  8:30 AM Stephanie Avitia MD [id number] [facility]

## 2019-12-13 ENCOUNTER — HOSPITAL ENCOUNTER (OUTPATIENT)
Dept: PHYSICAL THERAPY | Age: 64
Discharge: HOME | End: 2019-12-13
Payer: OTHER GOVERNMENT

## 2019-12-13 PROCEDURE — 97110 THERAPEUTIC EXERCISES: CPT

## 2019-12-13 PROCEDURE — 97140 MANUAL THERAPY 1/> REGIONS: CPT

## 2019-12-13 NOTE — PROGRESS NOTES
PHYSICAL THERAPY - DAILY TREATMENT NOTE    Patient Name: Yoselin Armenta        Date: 2019  : 1955   yes Patient  Verified  Visit #:     Insurance: Payor: Estephanie Alvarado / Plan: Wyatt Caller / Product Type: Commerical /      In time: 9:55 Out time: 10:30   Total Treatment Time: 35     Medicare/[facility] Time Tracking (below)   Total Timed Codes (min):  na 1:1 Treatment Time:  na     TREATMENT AREA =  Low back pain [M54.5]  SUBJECTIVE  Pain Level (on 0 to 10 scale):  2-3  / 10   Medication Changes/New allergies or changes in medical history, any new surgeries or procedures?    no  If yes, update Summary List   Subjective Functional Status/Changes:  []  No changes reported     When I woke up this am It was hurting a little today and it hurt a little with walking          OBJECTIVE  20 min Therapeutic Exercise:  [x]? ???  See flow sheet   Rationale:      increase ROM, increase strength and improve coordination to improve the patients ability to perform ADLs      13 min Manual Therapy: T/S mob,  shot gun  Tech, L IS ant inn quita, L 2-5L FRS,    Rationale:      decrease pain, increase ROM and increase tissue extensibility to improve patient's ability to perform ADLs     Dry Needling Procedure Note     Dry Needle Session Number:  10  Procedure: An intramuscular manual therapy (dry needling) and a neuro-muscular re-education treatment was done to deactivate myofascial trigger points, with a 15/30 gauge solid filament needle, under aseptic technique.     Indication(s):        [x]? ??? Muscle spasms      []???? Myalgia/Myositis  []???? Muscle cramps                                         []???? Muscle imbalances         []???? TMD (TMJ)          []???? Myofascial pain & dysfunction                 []???? Other: __     Chart reviewed for the following:  IGretchen PT, have reviewed the medical history, summary list and precautions/contraindications for Memo Walton.     TIME OUT performed immediately prior to start of procedure:  10:04(enter time the timeout was completed)  Zuleika Pratt PT, have performed the following reviews on Memo Walton prior to the start of the session:      [x]? ??? Patient was identified by name and date of birth    [x]? ??? Agreement on all muscles being treated was verified   [x]? ??? Purpose of dry needling, side effects, possible complications, risks and benefits were explained to the patient   [x]???? Procedure site(s) verified  [x]???? Patient was positioned for comfort and draped for privacy  [x]???? Informed Consent was signed (initial visit) and verified verbally (subsequent visits)  [x]???? Patient was instructed on the need to report the use of blood thinners and/or immunosuppressant medications  [x]???? How to respond to possible adverse effects of treatment  [x]???? Self treatment of post needling soreness: ice, heat (moist heat, heat wraps) and stretching  [x]???? Opportunity was given to ask any questions, all questions were answered     Treatment:  The following muscles were treated today:     Right: L/S Para/multifidus,    Left: L/S Para/multifidus, psoas      Patients response to todays treatment:   []????  LTRs               []????  Muscle Relaxation                      []????  Pain Relief                     []????  Decreased Tinnitus  []????  Decreased HAs           []????  Post needling soreness  []????  Increased ROM              []????  Other:        Actual needle insertion time is not billed        Billed With/As:   [] TE   [] TA   [] Neuro   [] Self Care Patient Education: [x] Review HEP    [] Progressed/Changed HEP based on:   [] positioning   [] body mechanics   [] transfers   [] heat/ice application    [] other:      Other Objective/Functional Measures:    Increased soft tissue tension noted at upper lumbar para today     Post Treatment Pain Level (on 0 to 10) scale:   0  / 10     ASSESSMENT  Assessment/Changes in Function:     No pain with walking after man Rx     []  See Progress Note/Recertification   Patient will continue to benefit from skilled PT services to modify and progress therapeutic interventions, address functional mobility deficits and address ROM deficits to attain remaining goals.    Progress toward goals / Updated goals:    Slow progress with pain reduction      PLAN  [x]  Upgrade activities as tolerated yes Continue plan of care   []  Discharge due to :    []  Other:      Therapist: Mely Alvarado PT    Date: 12/13/2019 Time: 6:35 AM     Future Appointments   Date Time Provider Ame Alba   12/13/2019 10:00 AM Mynor Crawford, PT [facility]   12/16/2019 10:00 AM Mynor Crawford, PT [facility]   12/18/2019  9:00 AM Mynor Crawford, PT [facility]   12/23/2019 10:00 AM Mynor Crawford, PT [facility]   12/26/2019 10:30 AM Mynor Crawford, PT [facility]   12/30/2019 10:30 AM Mynor Crawford, PT [facility]   1/9/2020  1:00 PM Ara Rubin MD [facility]   4/10/2020  8:30 AM Krys Swenson MD [id number] [facility]

## 2019-12-16 ENCOUNTER — HOSPITAL ENCOUNTER (OUTPATIENT)
Dept: PHYSICAL THERAPY | Age: 64
Discharge: HOME | End: 2019-12-16
Payer: OTHER GOVERNMENT

## 2019-12-16 PROCEDURE — 97140 MANUAL THERAPY 1/> REGIONS: CPT

## 2019-12-16 PROCEDURE — 97110 THERAPEUTIC EXERCISES: CPT

## 2019-12-16 NOTE — PROGRESS NOTES
PHYSICAL THERAPY - DAILY TREATMENT NOTE    Patient Name: Yoselin Armenta        Date: 2019  : 1955   yes Patient  Verified  Visit #:     Insurance: Payor: [employer] / Plan: Wyatt Caller / Product Type: Commerical /      In time: 9:55 Out time: 10:25   Total Treatment Time: 30     Medicare/[facility] Time Tracking (below)   Total Timed Codes (min):  na 1:1 Treatment Time:  na     TREATMENT AREA =  Low back pain [M54.5]  SUBJECTIVE  Pain Level (on 0 to 10 scale):  1  / 10   Medication Changes/New allergies or changes in medical history, any new surgeries or procedures?    no  If yes, update Summary List   Subjective Functional Status/Changes:  []  No changes reported     Slight tightness, but doing well. OBJECTIVE  20 min Therapeutic Exercise:  [x]? ????  See flow sheet   Rationale:      increase ROM, increase strength and improve coordination to improve the patients ability to perform ADLs      8 min Manual Therapy: T/S mob,  shot gun  Tech, , L 5L FRS,    Rationale:      decrease pain, increase ROM and increase tissue extensibility to improve patient's ability to perform ADLs     Dry Needling Procedure Note     Dry Needle Session Number:  11  Procedure:    An intramuscular manual therapy (dry needling) and a neuro-muscular re-education treatment was done to deactivate myofascial trigger points, with a 15/30 gauge solid filament needle, under aseptic technique.     Indication(s):        [x]????? Muscle spasms      []????? Myalgia/Myositis  []????? Muscle cramps                                         []????? Muscle imbalances         []????? TMD (TMJ)          []????? Myofascial pain & dysfunction                 []????? Other: __     Chart reviewed for the following:  IGretchen PT, have reviewed the medical history, summary list and precautions/contraindications for Memo Walton.     TIME OUT performed immediately prior to start of procedure:  9:57(enter time the timeout was completed)  Genia Sanon PT, have performed the following reviews on Memo Walton prior to the start of the session:      [x]????? Patient was identified by name and date of birth    [x]????? Agreement on all muscles being treated was verified   [x]????? Purpose of dry needling, side effects, possible complications, risks and benefits were explained to the patient   [x]????? Procedure site(s) verified  [x]????? Patient was positioned for comfort and draped for privacy  [x]????? Informed Consent was signed (initial visit) and verified verbally (subsequent visits)  [x]????? Patient was instructed on the need to report the use of blood thinners and/or immunosuppressant medications  [x]????? How to respond to possible adverse effects of treatment  [x]????? Self treatment of post needling soreness: ice, heat (moist heat, heat wraps) and stretching  [x]????? Opportunity was given to ask any questions, all questions were answered     Treatment:  The following muscles were treated today:     Right: L/S Para/multifidus,    Left: L/S Para/multifidus, psoas      Patients response to todays treatment:   []?????  LTRs               []?????  Muscle Relaxation                      []?????  Pain Relief                     []?????  Decreased Tinnitus  []?????  Decreased HAs           []?????  Post needling soreness  []?????  Increased ROM              []?????  Other:        Actual needle insertion time is not billed          Billed With/As:   [] TE   [] TA   [] Neuro   [] Self Care Patient Education: [x] Review HEP    [] Progressed/Changed HEP based on:   [] positioning   [] body mechanics   [] transfers   [] heat/ice application    [] other:      Other Objective/Functional Measures:    Cont to have increase soft tissue tension noted at L para     Post Treatment Pain Level (on 0 to 10) scale:   0  / 10     ASSESSMENT  Assessment/Changes in Function:     Good stability with all Ex     []  See Progress Note/Recertification   Patient will continue to benefit from skilled PT services to modify and progress therapeutic interventions, address functional mobility deficits, address ROM deficits and address strength deficits to attain remaining goals.    Progress toward goals / Updated goals:    Slowly progressing with stability     PLAN  [x]  Upgrade activities as tolerated yes Continue plan of care   []  Discharge due to :    []  Other:      Therapist: Jeremy Melendrez PT    Date: 12/16/2019 Time: 6:31 AM     Future Appointments   Date Time Provider Ame Abla   12/16/2019 10:00 AM Amanda Freeman, PT [facility]   12/18/2019  9:00 AM Amanda Cancel, PT [facility]   12/23/2019 10:00 AM Amanda Cancel, PT [facility]   12/26/2019 10:30 AM Amanda Cancel, PT [facility]   12/30/2019 10:30 AM Amanda Cancel, PT [facility]   1/9/2020  1:00 PM Rosa Isela Rubin MD [facility]   4/10/2020  8:30 AM Daniel Myrick MD [address] [facility]

## 2019-12-17 ENCOUNTER — TELEPHONE (OUTPATIENT)
Dept: FAMILY MEDICINE CLINIC | Age: 64
End: 2019-12-17

## 2019-12-17 NOTE — TELEPHONE ENCOUNTER
Called pt. He held onto the FIT envelope for a while. He understands he will need to come by the office,  a fresh kit and a reprint of the original order for occult blood to send with this.

## 2019-12-18 ENCOUNTER — HOSPITAL ENCOUNTER (OUTPATIENT)
Dept: PHYSICAL THERAPY | Age: 64
Discharge: HOME | End: 2019-12-18
Payer: OTHER GOVERNMENT

## 2019-12-18 PROCEDURE — 97140 MANUAL THERAPY 1/> REGIONS: CPT

## 2019-12-18 PROCEDURE — 97110 THERAPEUTIC EXERCISES: CPT

## 2019-12-18 NOTE — PROGRESS NOTES
PHYSICAL THERAPY - DAILY TREATMENT NOTE    Patient Name: Vlad Christine        Date: 2019  : 1955   yes Patient  Verified  Visit #:   15   of   24  Insurance: Payor: Juan Ha[employer] / Plan: Ramandeep Gutierrez / Product Type: Commerical /      In time: 8:51 Out time: 9:26   Total Treatment Time: 35     Medicare/[facility] Time Tracking (below)   Total Timed Codes (min):  na 1:1 Treatment Time:  na     TREATMENT AREA =  Low back pain [M54.5]  SUBJECTIVE  Pain Level (on 0 to 10 scale):  0  / 10   Medication Changes/New allergies or changes in medical history, any new surgeries or procedures?    no  If yes, update Summary List   Subjective Functional Status/Changes:  []  No changes reported     Its been pretty good. OBJECTIVE  20 min Therapeutic Exercise:  [x]??????  See flow sheet   Rationale:      increase ROM, increase strength and improve coordination to improve the patients ability to perform ADLs      8 min Manual Therapy: T/S mob,  shot gun  Tech   Rationale:      decrease pain, increase ROM and increase tissue extensibility to improve patient's ability to perform ADLs     Dry Needling Procedure Note     Dry Needle Session Number:  12  Procedure:    An intramuscular manual therapy (dry needling) and a neuro-muscular re-education treatment was done to deactivate myofascial trigger points, with a 15/30 gauge solid filament needle, under aseptic technique.     Indication(s):        [x]?????? Muscle spasms      []?????? Myalgia/Myositis  []?????? Muscle cramps                                         []?????? Muscle imbalances         []?????? TMD (TMJ)          []?????? Myofascial pain & dysfunction                 []?????? Other: __     Chart reviewed for the following:  Gretchen TRAMMELL PT, have reviewed the medical history, summary list and precautions/contraindications for Memo Walton.     TIME OUT performed immediately prior to start of procedure:  8:55(enter time the timeout was completed)  I, Gretchen Matson PT, have performed the following reviews on Memo Walton prior to the start of the session:      [x]?????? Patient was identified by name and date of birth    [x]?????? Agreement on all muscles being treated was verified   [x]?????? Purpose of dry needling, side effects, possible complications, risks and benefits were explained to the patient   [x]?????? Procedure site(s) verified  [x]?????? Patient was positioned for comfort and draped for privacy  [x]?????? Informed Consent was signed (initial visit) and verified verbally (subsequent visits)  [x]?????? Patient was instructed on the need to report the use of blood thinners and/or immunosuppressant medications  [x]?????? How to respond to possible adverse effects of treatment  [x]?????? Self treatment of post needling soreness: ice, heat (moist heat, heat wraps) and stretching  [x]?????? Opportunity was given to ask any questions, all questions were answered     Treatment:  The following muscles were treated today:     Right: L/S Para/multifidus,    Left: L/S Para/multifidus, psoas      Patients response to todays treatment:   []??????  LTRs               []??????  Muscle Relaxation                      []??????  Pain Relief                     []??????  Decreased Tinnitus  []??????  Decreased HAs           []??????  Post needling soreness  []??????  Increased ROM              []??????  Other:        Actual needle insertion time is not billed             Billed With/As:   [] TE   [] TA   [] Neuro   [] Self Care Patient Education: [x] Review HEP    [] Progressed/Changed HEP based on:   [] positioning   [] body mechanics   [] transfers   [] heat/ice application    [] other:      Other Objective/Functional Measures:    Cont to have increased soft tissue tension noted at L para     Post Treatment Pain Level (on 0 to 10) scale:   0  / 10     ASSESSMENT  Assessment/Changes in Function:     Good stability with 1 leg up chop/lift     [] See Progress Note/Recertification   Patient will continue to benefit from skilled PT services to modify and progress therapeutic interventions, address functional mobility deficits and address ROM deficits to attain remaining goals.    Progress toward goals / Updated goals:    Progressing well with staiblity     PLAN  [x]  Upgrade activities as tolerated yes Continue plan of care   []  Discharge due to :    []  Other:      Therapist: Balbina Brewer PT    Date: 12/18/2019 Time: 6:30 AM     Future Appointments   Date Time Provider Ame Alba   12/18/2019  9:00 AM Maria Eugenia Mahan PT [facility]   12/23/2019 10:00 AM Maria Eugenia Mahan PT [facility]   12/26/2019 10:30 AM Maria Eugenia Mahan PT [facility]   12/30/2019 10:30 AM Maria Eugenia Mahan, PT [facility]   1/9/2020  1:00 PM Jodi Aj MD [facility]

## 2019-12-19 ENCOUNTER — HOSPITAL ENCOUNTER (OUTPATIENT)
Dept: LAB | Age: 64
Discharge: HOME | End: 2019-12-19
Payer: OTHER GOVERNMENT

## 2019-12-19 DIAGNOSIS — R97.20 ELEVATED PROSTATE SPECIFIC ANTIGEN (PSA): ICD-10-CM

## 2019-12-19 DIAGNOSIS — I10 BENIGN ESSENTIAL HTN: ICD-10-CM

## 2019-12-19 DIAGNOSIS — Z12.11 SCREEN FOR COLON CANCER: ICD-10-CM

## 2019-12-19 LAB
ALBUMIN SERPL-MCNC: 3.7 G/DL (ref 3.4–5)
ALBUMIN/GLOB SERPL: 1.1 {RATIO} (ref 0.8–1.7)
ALP SERPL-CCNC: 81 U/L (ref 45–117)
ALT SERPL-CCNC: 38 U/L (ref 16–61)
ANION GAP SERPL CALC-SCNC: 3 MMOL/L (ref 3–18)
AST SERPL-CCNC: 22 U/L (ref 10–38)
BILIRUB SERPL-MCNC: 0.4 MG/DL (ref 0.2–1)
BUN SERPL-MCNC: 22 MG/DL (ref 7–18)
BUN/CREAT SERPL: 17 (ref 12–20)
CALCIUM SERPL-MCNC: 8.9 MG/DL (ref 8.5–10.1)
CHLORIDE SERPL-SCNC: 107 MMOL/L (ref 100–111)
CHOLEST SERPL-MCNC: 194 MG/DL
CO2 SERPL-SCNC: 30 MMOL/L (ref 21–32)
CREAT SERPL-MCNC: 1.26 MG/DL (ref 0.6–1.3)
ERYTHROCYTE [DISTWIDTH] IN BLOOD BY AUTOMATED COUNT: 13.9 % (ref 11.6–14.5)
GLOBULIN SER CALC-MCNC: 3.4 G/DL (ref 2–4)
GLUCOSE SERPL-MCNC: 99 MG/DL (ref 74–99)
HCT VFR BLD AUTO: 46.8 % (ref 36–48)
HDLC SERPL-MCNC: 59 MG/DL (ref 40–60)
HDLC SERPL: 3.3 {RATIO} (ref 0–5)
HGB BLD-MCNC: 15.4 G/DL (ref 13–16)
LDLC SERPL CALC-MCNC: 114.4 MG/DL (ref 0–100)
LIPID PROFILE,FLP: ABNORMAL
MCH RBC QN AUTO: 30.7 PG (ref 24–34)
MCHC RBC AUTO-ENTMCNC: 32.9 G/DL (ref 31–37)
MCV RBC AUTO: 93.4 FL (ref 74–97)
PLATELET # BLD AUTO: 197 K/UL (ref 135–420)
PMV BLD AUTO: 10.5 FL (ref 9.2–11.8)
POTASSIUM SERPL-SCNC: 4.1 MMOL/L (ref 3.5–5.5)
PROT SERPL-MCNC: 7.1 G/DL (ref 6.4–8.2)
RBC # BLD AUTO: 5.01 M/UL (ref 4.7–5.5)
SODIUM SERPL-SCNC: 140 MMOL/L (ref 136–145)
TRIGL SERPL-MCNC: 103 MG/DL (ref ?–150)
VLDLC SERPL CALC-MCNC: 20.6 MG/DL
WBC # BLD AUTO: 4.8 K/UL (ref 4.6–13.2)

## 2019-12-19 PROCEDURE — 36415 COLL VENOUS BLD VENIPUNCTURE: CPT

## 2019-12-19 PROCEDURE — 80061 LIPID PANEL: CPT

## 2019-12-19 PROCEDURE — 84153 ASSAY OF PSA TOTAL: CPT

## 2019-12-19 PROCEDURE — 80053 COMPREHEN METABOLIC PANEL: CPT

## 2019-12-19 PROCEDURE — 85027 COMPLETE CBC AUTOMATED: CPT

## 2019-12-19 PROCEDURE — 82274 ASSAY TEST FOR BLOOD FECAL: CPT

## 2019-12-20 LAB
HEMOCCULT STL QL IA: NEGATIVE
PSA SERPL-MCNC: 19.4 NG/ML (ref 0–4)
REFLEX CRITERIA: ABNORMAL

## 2019-12-23 ENCOUNTER — HOSPITAL ENCOUNTER (OUTPATIENT)
Dept: PHYSICAL THERAPY | Age: 64
Discharge: HOME | End: 2019-12-23
Payer: OTHER GOVERNMENT

## 2019-12-23 PROCEDURE — 97110 THERAPEUTIC EXERCISES: CPT

## 2019-12-23 PROCEDURE — 97140 MANUAL THERAPY 1/> REGIONS: CPT

## 2019-12-26 ENCOUNTER — HOSPITAL ENCOUNTER (OUTPATIENT)
Dept: PHYSICAL THERAPY | Age: 64
Discharge: HOME | End: 2019-12-26
Payer: OTHER GOVERNMENT

## 2019-12-26 PROCEDURE — 97110 THERAPEUTIC EXERCISES: CPT

## 2019-12-26 PROCEDURE — 97140 MANUAL THERAPY 1/> REGIONS: CPT

## 2019-12-26 NOTE — PROGRESS NOTES
PHYSICAL THERAPY - DAILY TREATMENT NOTE    Patient Name: Kelsea Marie        Date: 2019  : 1955   yes Patient  Verified  Visit #:     Insurance: Payor: [employer] / Plan: Maureen Motley / Product Type:  /      In time: 10:25 Out time: 11:05   Total Treatment Time: 40     Medicare/[employer] Time Tracking (below)   Total Timed Codes (min):  na 1:1 Treatment Time:  na     TREATMENT AREA =  Low back pain [M54.5]  SUBJECTIVE  Pain Level (on 0 to 10 scale):  0  / 10   Medication Changes/New allergies or changes in medical history, any new surgeries or procedures?    no  If yes, update Summary List   Subjective Functional Status/Changes:  []  No changes reported     I havent had the twinge lately. It just gets tight across the lower back. OBJECTIVE  25 min Therapeutic Exercise:  [x]????????  See flow sheet   Rationale:      increase ROM, increase strength and improve coordination to improve the patients ability to perform ADLs      10 min Manual Therapy: T/S mob,  shot gun  Tech, L3-5 ERS quita, L on R SI quita   Rationale:      decrease pain, increase ROM and increase tissue extensibility to improve patient's ability to perform ADLs     Dry Needling Procedure Note     Dry Needle Session Number:  14  Procedure:    An intramuscular manual therapy (dry needling) and a neuro-muscular re-education treatment was done to deactivate myofascial trigger points, with a 15/30 gauge solid filament needle, under aseptic technique.     Indication(s):        [x]???????? Muscle spasms      []???????? Myalgia/Myositis  []???????? Muscle cramps                                         []???????? Muscle imbalances         []???????? TMD (TMJ)          []???????? Myofascial pain & dysfunction                 []???????? Other: __     Chart reviewed for the following:  Gretchen TRAMMELL PT, have reviewed the medical history, summary list and precautions/contraindications for Memo ZAMUDIO Cosmo Sharif 23 performed immediately prior to start of procedure:  10:30(enter time the timeout was completed)  IGretchen PT, have performed the following reviews on Memo Walton prior to the start of the session:      [x]???????? Patient was identified by name and date of birth    [x]???????? Agreement on all muscles being treated was verified   [x]???????? Purpose of dry needling, side effects, possible complications, risks and benefits were explained to the patient   [x]???????? Procedure site(s) verified  [x]???????? Patient was positioned for comfort and draped for privacy  [x]???????? Informed Consent was signed (initial visit) and verified verbally (subsequent visits)  [x]???????? Patient was instructed on the need to report the use of blood thinners and/or immunosuppressant medications  [x]???????? How to respond to possible adverse effects of treatment  [x]???????? Self treatment of post needling soreness: ice, heat (moist heat, heat wraps) and stretching  [x]???????? Opportunity was given to ask any questions, all questions were answered     Treatment:  The following muscles were treated today:     Right: L/S Para/multifidus,    Left: L/S Para/multifidus, psoas      Patients response to todays treatment:   []????????  LTRs               []????????  Muscle Relaxation                      []????????  Pain Relief                     []????????  Decreased Tinnitus  []????????  Decreased HAs           []????????  Post needling soreness  []????????  Increased ROM              []????????  Other:        Actual needle insertion time is not billed                Billed With/As:   [] TE   [] TA   [] Neuro   [] Self Care Patient Education: [x] Review HEP    [] Progressed/Changed HEP based on:   [] positioning   [] body mechanics   [] transfers   [] heat/ice application    [] other:      Other Objective/Functional Measures:    Cont to have sig increase in soft tissue tension noted at L para Post Treatment Pain Level (on 0 to 10) scale:   0  / 10     ASSESSMENT  Assessment/Changes in Function:     Cont to have the tendency to use lower lumbar with RDL     []  See Progress Note/Recertification   Patient will continue to benefit from skilled PT services to modify and progress therapeutic interventions, address functional mobility deficits and address ROM deficits to attain remaining goals.    Progress toward goals / Updated goals:    Slowly progressing with pain reduction      PLAN  [x]  Upgrade activities as tolerated yes Continue plan of care   []  Discharge due to :    []  Other:      Therapist: Mohamud Ely PT    Date: 12/26/2019 Time: 6:57 AM     Future Appointments   Date Time Provider Ame Alba   12/26/2019 10:30 AM Mitzi Allan, PT [place] [address]   12/30/2019 10:30 AM Mitzi Allan, PT [place] [address]   1/6/2020  8:45 AM Shazia Reyna MD [facility]   1/8/2020 10:00 AM Mitzi Allan, PT [place] [address]   1/9/2020  1:00 PM Livier Rubin MD [facility]   1/15/2020 10:00 AM Mitzi Allan, PT [place] [address]   1/22/2020 10:00 AM Mitzi Allan, PT [place] [address]   4/10/2020  8:30 AM Shazia Reyna MD [facility]

## 2019-12-26 NOTE — PROGRESS NOTES
[address] [place] [address] [address] [facility] - Phone: [phone]  Fax: [phone]  PROGRESS NOTE  Patient Name: Yoselin Armenta : 1955   Treatment/Medical Diagnosis: Low back pain [M54.5]   Referral Source: Jaky Wheeler DO       Date of Initial Visit: 10/17/19 Attended Visits: 14 Missed Visits: 0      Jack Seay has been seen at our clinic 1-2x/wk for a total of 14 visits. Pt treatment has consisted of  therapeutic exercise for thoracic ROM, hip/core strengthening, and manual therapy(jt mobilization and deep tissue mobilization)    CURRENT STATUS  Pt has had a good tolerance to physical therapy treatment. He reports improved ability to perform ADLs and has only complained of minor occasional pain in his low back. He continues to make  gradual progress with stability therapeutic exercises, and has not complained of \"twinge\" in his lower lumbar recently. However, he continues to complain of occasional severe tightness in his lower lumbar region o           Goal/Measure of Progress Goal Met? 1. Pt will report 50% decrease in twinge in his lower lumbar to assist with ADLs   Status at last Eval: na Current Status: See above yes     New Goals to be achieved in __4__  weeks:  1. Pt will be independent with advanced HEP in preparation for DC   2.     3.     RECOMMENDATIONS    Specifics: 1-2x/wk x4 more wks  If you have any questions/comments please contact us directly at [phone]. Thank you for allowing us to assist in the care of your patient.     Therapist Signature: Roman Owusu, AJAY, OCS, SCS, CSCS Date: 2019     Time: 10:00 AM   NOTE TO PHYSICIAN:  PLEASE COMPLETE THE ORDERS BELOW AND FAX TO   [facility]: [phone]  If you are unable to process this request in 24 hours please contact our office: [phone]    ___ I have read the above report and request that my patient continue as recommended.   ___ I have read the above report and request that my patient continue therapy with the following changes/special instructions:_________________________________________________________   ___ I have read the above report and request that my patient be discharged from therapy.      Physician Signature:        Date:       Time:

## 2019-12-30 ENCOUNTER — HOSPITAL ENCOUNTER (OUTPATIENT)
Dept: PHYSICAL THERAPY | Age: 64
Discharge: HOME | End: 2019-12-30
Payer: OTHER GOVERNMENT

## 2019-12-30 PROCEDURE — 97110 THERAPEUTIC EXERCISES: CPT

## 2019-12-30 NOTE — PROGRESS NOTES
PHYSICAL THERAPY - DAILY TREATMENT NOTE    Patient Name: Alexa Villeda        Date: 2019  : 1955   yes Patient  Verified  Visit #:     Insurance: Payor:  / Plan: Guzman Maynard [address] / Product Type:  /      In time: 10:25 Out time: 10:55   Total Treatment Time: 30     Medicare/[facility] Time Tracking (below)   Total Timed Codes (min):  na 1:1 Treatment Time:  na     TREATMENT AREA =  Low back pain [M54.5]  SUBJECTIVE  Pain Level (on 0 to 10 scale):  0  / 10   Medication Changes/New allergies or changes in medical history, any new surgeries or procedures?    no  If yes, update Summary List   Subjective Functional Status/Changes:  []  No changes reported     No new c/o          OBJECTIVE  23 min Therapeutic Exercise:  [x]?????????  See flow sheet   Rationale:      increase ROM, increase strength and improve coordination to improve the patients ability to perform ADLs      5 min Manual Therapy: shot gun  Tech, L3 FRS quita   Rationale:      decrease pain, increase ROM and increase tissue extensibility to improve patient's ability to perform ADLs     Dry Needling Procedure Note     Dry Needle Session Number:  15  Procedure:    An intramuscular manual therapy (dry needling) and a neuro-muscular re-education treatment was done to deactivate myofascial trigger points, with a 15/30 gauge solid filament needle, under aseptic technique.     Indication(s):        [x]????????? Muscle spasms      []????????? Myalgia/Myositis  []????????? Muscle cramps                                         []????????? Muscle imbalances         []????????? TMD (TMJ)          []????????? Myofascial pain & dysfunction                 []????????? Other: __     Chart reviewed for the following:  Gretchen TRAMMELL PT, have reviewed the medical history, summary list and precautions/contraindications for Memo Walton.     TIME OUT performed immediately prior to start of procedure:  10:27(enter time the timeout was completed)  Gretchen TRAMMELL PT, have performed the following reviews on Memo Walton prior to the start of the session:      [x]????????? Patient was identified by name and date of birth    [x]????????? Agreement on all muscles being treated was verified   [x]????????? Purpose of dry needling, side effects, possible complications, risks and benefits were explained to the patient   [x]????????? Procedure site(s) verified  [x]????????? Patient was positioned for comfort and draped for privacy  [x]????????? Informed Consent was signed (initial visit) and verified verbally (subsequent visits)  [x]????????? Patient was instructed on the need to report the use of blood thinners and/or immunosuppressant medications  [x]????????? How to respond to possible adverse effects of treatment  [x]????????? Self treatment of post needling soreness: ice, heat (moist heat, heat wraps) and stretching  [x]????????? Opportunity was given to ask any questions, all questions were answered     Treatment:  The following muscles were treated today:     Right: L/S Para/multifidus,    Left: L/S Para/multifidus, psoas      Patients response to todays treatment:   []?????????  LTRs               []?????????  Muscle Relaxation                      []?????????  Pain Relief                     []?????????  Decreased Tinnitus  []?????????  Decreased HAs           []?????????  Post needling soreness  []?????????  Increased ROM              []?????????  Other:        Actual needle insertion time is not billed       Billed With/As:   [] TE   [] TA   [] Neuro   [] Self Care Patient Education: [x] Review HEP    [] Progressed/Changed HEP based on:   [] positioning   [] body mechanics   [] transfers   [] heat/ice application    [] other:      Other Objective/Functional Measures:    Cont to have increased soft tissue tension noted at R para     Post Treatment Pain Level (on 0 to 10) scale:   0  / 10     ASSESSMENT  Assessment/Changes in Function:     Good stability with all ex     []  See Progress Note/Recertification   Patient will continue to benefit from skilled PT services to modify and progress therapeutic interventions, address functional mobility deficits and address ROM deficits to attain remaining goals.    Progress toward goals / Updated goals:    Slow progress with pain reduction      PLAN  [x]  Upgrade activities as tolerated yes Continue plan of care   []  Discharge due to :    []  Other:      Therapist: Elida Fraser PT    Date: 12/30/2019 Time: 6:30 AM     Future Appointments   Date Time Provider Ame Alba   12/30/2019 10:30 AM Rito Powers, PT [facility]   1/6/2020  8:45 AM Maryann Colon MD [facility]   1/8/2020 10:00 AM Rito Powers, PT [facility]   1/9/2020  1:00 PM Jaky Rubin MD [facility]   1/15/2020 10:00 AM Rito Powers, PT [facility]   1/22/2020 10:00 AM Rito Powers, PT [facility]   4/10/2020  8:30 AM Maryann Colon MD [facility]

## 2020-01-08 ENCOUNTER — HOSPITAL ENCOUNTER (OUTPATIENT)
Dept: PHYSICAL THERAPY | Age: 65
Discharge: HOME | End: 2020-01-08
Payer: OTHER GOVERNMENT

## 2020-01-08 PROCEDURE — 97110 THERAPEUTIC EXERCISES: CPT

## 2020-01-08 NOTE — PROGRESS NOTES
PHYSICAL THERAPY - DAILY TREATMENT NOTE    Patient Name: Desi Thompson        Date: 2020  : 1955   yes Patient  Verified  Visit #:     Insurance: Payor: FARIDA / Plan: Guzman Maynard 74 / Product Type:  /      In time: 10:00 Out time: 10:30   Total Treatment Time: 30     Medicare/BCBS Time Tracking (below)   Total Timed Codes (min):  na 1:1 Treatment Time:  na     TREATMENT AREA =  Low back pain [M54.5]  SUBJECTIVE  Pain Level (on 0 to 10 scale):  0  / 10   Medication Changes/New allergies or changes in medical history, any new surgeries or procedures?    no  If yes, update Summary List   Subjective Functional Status/Changes:  []  No changes reported     Had a tiny twinge this am but ok now          OBJECTIVE  23 min Therapeutic Exercise:  [x]??????????  See flow sheet   Rationale:      increase ROM, increase strength and improve coordination to improve the patients ability to perform ADLs      5 min Manual Therapy: shot gun  Tech, L3 FRS quita   Rationale:      decrease pain, increase ROM and increase tissue extensibility to improve patient's ability to perform ADLs     Dry Needling Procedure Note     Dry Needle Session Number:  16  Procedure:    An intramuscular manual therapy (dry needling) and a neuro-muscular re-education treatment was done to deactivate myofascial trigger points, with a 15/30 gauge solid filament needle, under aseptic technique.     Indication(s):        [x]?????????? Muscle spasms      []?????????? Myalgia/Myositis  []?????????? Muscle cramps                                         []?????????? Muscle imbalances         []?????????? TMD (TMJ)          []?????????? Myofascial pain & dysfunction                 []?????????? Other: __     Chart reviewed for the following:  Gretchen TRAMMELL PT, have reviewed the medical history, summary list and precautions/contraindications for Memo Walton.     TIME OUT performed immediately prior to start of procedure:  10:02(enter time the timeout was completed)  Gretchen TRAMMELL PT, have performed the following reviews on Memo Walton prior to the start of the session:      [x]?????????? Patient was identified by name and date of birth    [x]?????????? Agreement on all muscles being treated was verified   [x]?????????? Purpose of dry needling, side effects, possible complications, risks and benefits were explained to the patient   [x]?????????? Procedure site(s) verified  [x]?????????? Patient was positioned for comfort and draped for privacy  [x]?????????? Informed Consent was signed (initial visit) and verified verbally (subsequent visits)  [x]?????????? Patient was instructed on the need to report the use of blood thinners and/or immunosuppressant medications  [x]?????????? How to respond to possible adverse effects of treatment  [x]?????????? Self treatment of post needling soreness: ice, heat (moist heat, heat wraps) and stretching  [x]?????????? Opportunity was given to ask any questions, all questions were answered     Treatment:  The following muscles were treated today:     Right: L/S Para/multifidus,    Left: L/S Para/multifidus, psoas      Patients response to todays treatment:   []??????????  LTRs               []??????????  Muscle Relaxation                      []??????????  Pain Relief                     []??????????  Decreased Tinnitus  []??????????  Decreased HAs           []??????????  Post needling soreness  []??????????  Increased ROM              []??????????  Other:        Actual needle insertion time is not billed             Billed With/As:   [] TE   [] TA   [] Neuro   [] Self Care Patient Education: [x] Review HEP    [] Progressed/Changed HEP based on:   [] positioning   [] body mechanics   [] transfers   [] heat/ice application    [] other:      Other Objective/Functional Measures:    Good stability with all ex today     Post Treatment Pain Level (on 0 to 10) scale:   0  / 10 ASSESSMENT  Assessment/Changes in Function:     Able to perform RDL with deeper flx ROM without increase in pain today     []  See Progress Note/Recertification   Patient will continue to benefit from skilled PT services to modify and progress therapeutic interventions, address functional mobility deficits and address ROM deficits to attain remaining goals.    Progress toward goals / Updated goals:    Progressing well with function     PLAN  [x]  Upgrade activities as tolerated yes Continue plan of care   []  Discharge due to :    []  Other:      Therapist: Lorraine Marks PT    Date: 1/8/2020 Time: 6:30 AM     Future Appointments   Date Time Provider Ame Alba   1/8/2020 10:00 AM Janneth Erickson, PT [facility]   1/9/2020  1:00 PM Carla Rubin MD [facility]   1/15/2020 10:00 AM Janneth Erickson, PT [facility]   1/22/2020 10:00 AM Janneth Erickson, PT [facility]   4/10/2020  8:30 AM Jimmy Arana MD [facility]

## 2020-01-09 ENCOUNTER — OFFICE VISIT (OUTPATIENT)
Dept: FAMILY MEDICINE CLINIC | Age: 65
End: 2020-01-09

## 2020-01-09 VITALS
HEIGHT: 76 IN | BODY MASS INDEX: 29.22 KG/M2 | RESPIRATION RATE: 18 BRPM | DIASTOLIC BLOOD PRESSURE: 74 MMHG | TEMPERATURE: 97.1 F | WEIGHT: 240 LBS | OXYGEN SATURATION: 97 % | SYSTOLIC BLOOD PRESSURE: 122 MMHG | HEART RATE: 60 BPM

## 2020-01-09 DIAGNOSIS — E78.2 MIXED HYPERCHOLESTEROLEMIA AND HYPERTRIGLYCERIDEMIA: ICD-10-CM

## 2020-01-09 DIAGNOSIS — Z00.00 ROUTINE GENERAL MEDICAL EXAMINATION AT A HEALTH CARE FACILITY: Primary | ICD-10-CM

## 2020-01-09 DIAGNOSIS — Z23 ENCOUNTER FOR IMMUNIZATION: ICD-10-CM

## 2020-01-09 DIAGNOSIS — G47.52 REM SLEEP BEHAVIOR DISORDER: ICD-10-CM

## 2020-01-09 DIAGNOSIS — F10.90 HEAVY ALCOHOL USE: ICD-10-CM

## 2020-01-09 DIAGNOSIS — I10 BENIGN ESSENTIAL HTN: ICD-10-CM

## 2020-01-09 DIAGNOSIS — M54.16 LUMBAR RADICULAR PAIN: ICD-10-CM

## 2020-01-09 PROBLEM — F10.10 ALCOHOL ABUSE: Status: ACTIVE | Noted: 2020-01-09

## 2020-01-09 RX ORDER — CYCLOBENZAPRINE HCL 10 MG
10 TABLET ORAL
Qty: 90 TAB | Refills: 0 | Status: SHIPPED | OUTPATIENT
Start: 2020-01-09

## 2020-01-09 NOTE — PATIENT INSTRUCTIONS
Vaccine Information Statement    Recombinant Zoster (Shingles) Vaccine: What You Need to Know    Many Vaccine Information Statements are available in Azeri and other languages. See www.immunize.org/vis  Hojas de información sobre vacunas están disponibles en español y en muchos otros idiomas. Visite www.immunize.org/vis    1. Why get vaccinated? Recombinant zoster (shingles) vaccine can prevent shingles. Shingles (also called herpes zoster, or just zoster) is a painful skin rash, usually with blisters. In addition to the rash, shingles can cause fever, headache, chills, or upset stomach. More rarely, shingles can lead to pneumonia, hearing problems, blindness, brain inflammation (encephalitis), or death. The most common complication of shingles is long-term nerve pain called postherpetic neuralgia (PHN). PHN occurs in the areas where the shingles rash was, even after the rash clears up. It can last for months or years after the rash goes away. The pain from PHN can be severe and debilitating. About 10 to 18% of people who get shingles will experience PHN. The risk of PHN increases with age. An older adult with shingles is more likely to develop PHN and have longer lasting and more severe pain than a younger person with shingles. Shingles is caused by the varicella zoster virus, the same virus that causes chickenpox. After you have chickenpox, the virus stays in your body and can cause shingles later in life. Shingles cannot be passed from one person to another, but the virus that causes shingles can spread and cause chickenpox in someone who had never had chickenpox or received chickenpox vaccine. 2. Recombinant shingles vaccine    Recombinant shingles vaccine provides strong protection against shingles. By preventing shingles, recombinant shingles vaccine also protects against PHN. Recombinant shingles vaccine is the preferred vaccine for the prevention of shingles.   However, a different vaccine, live shingles vaccine, may be used in some circumstances. The recombinant shingles vaccine is recommended for adults 50 years and older without serious immune problems. It is given as a two-dose series. This vaccine is also recommended for people who have already gotten another type of shingles vaccine, the live shingles vaccine. There is no live virus in this vaccine. Shingles vaccine may be given at the same time as other vaccines. 3. Talk with your health care provider    Tell your vaccine provider if the person getting the vaccine:   Has had an allergic reaction after a previous dose of recombinant shingles vaccine, or has any severe, life-threatening allergies.  Is pregnant or breastfeeding.  Is currently experiencing an episode of shingles. In some cases, your health care provider may decide to postpone shingles vaccination to a future visit. People with minor illnesses, such as a cold, may be vaccinated. People who are moderately or severely ill should usually wait until they recover before getting recombinant shingles vaccine. Your health care provider can give you more information. 4. Risks of a vaccine reaction     A sore arm with mild or moderate pain is very common after recombinant shingles vaccine, affecting about 80% of vaccinated people. Redness and swelling can also happen at the site of the injection.  Tiredness, muscle pain, headache, shivering, fever, stomach pain, and nausea happen after vaccination in more than half of people who receive recombinant shingles vaccine. In clinical trials, about 1 out of 6 people who got recombinant zoster vaccine experienced side effects that prevented them from doing regular activities. Symptoms usually went away on their own in 2 to 3 days. You should still get the second dose of recombinant zoster vaccine even if you had one of these reactions after the first dose.       People sometimes faint after medical procedures, including vaccination. Tell your provider if you feel dizzy or have vision changes or ringing in the ears. As with any medicine, there is a very remote chance of a vaccine causing a severe allergic reaction, other serious injury, or death. 5. What if there is a serious problem? An allergic reaction could occur after the vaccinated person leaves the clinic. If you see signs of a severe allergic reaction (hives, swelling of the face and throat, difficulty breathing, a fast heartbeat, dizziness, or weakness), call 9-1-1 and get the person to the nearest hospital.    For other signs that concern you, call your health care provider. Adverse reactions should be reported to the Vaccine Adverse Event Reporting System (VAERS). Your health care provider will usually file this report, or you can do it yourself. Visit the VAERS website at www.vaers. [facility].gov or call 5-160.296.1795. VAERS is only for reporting reactions, and VAERS staff do not give medical advice. 6. How can I learn more?  Ask your health care provider.  Call your local or state health department.    Contact the Centers for Disease Control and Prevention (CDC):  - Call 3-948.197.6945 (1-800-CDC-INFO) or  - Visit CDCs website at www.cdc.gov/vaccines    Vaccine Information Statement   Recombinant Zoster Vaccine   10/30/2019    [facility] and [facility] Disease Control and Prevention    Office Use Only

## 2020-01-09 NOTE — PROGRESS NOTES
Assessment/Plan:    1. Routine general medical examination at a health care facility  -labs good    2. Benign essential HTN  -bp good    3. Mixed hypercholesterolemia and hypertriglyceridemia  -looking good    4. REM sleep behavior disorder  -on benzo    5. Heavy alcohol use  -counseled on cutting back    6. Lumbar radicular pain  -refilled  - cyclobenzaprine (FLEXERIL) 10 mg tablet; Take 1 Tab by mouth three (3) times daily as needed for Muscle Spasm(s). Indications: muscle spasm  Dispense: 90 Tab; Refill: 0      The plan was discussed with the patient. The patient verbalized understanding and is in agreement with the plan. All medication potential side effects were discussed with the patient. Health Maintenance:   Health Maintenance   Topic Date Due    Shingrix Vaccine Age 49> (2 of 2) 12/11/2019    FOBT Q 1 YEAR, 18+  12/19/2020    DTaP/Tdap/Td series (2 - Td) 01/01/2025    Hepatitis C Screening  Completed    Influenza Age 5 to Adult  Completed    Pneumococcal 0-64 years  Aged Out       Corrie Calderón is a 59 y.o. male and presents with Physical     Subjective:  Here for physical.  Labs overall stable. psa higher, is followed by urology for this issue and is electing to monitor issue rather than repeat biopsies which have been neg in past.     ROS:  Constitutional: No recent weight change. No weakness/fatigue. No f/c. Skin: No rashes, change in nails/hair, itching   HENT: No HA, dizziness. No hearing loss/tinnitus. No nasal congestion/discharge. Eyes: No change in vision, double/blurred vision or eye pain/redness. Cardiovascular: No CP/palpitations. No RASCON/orthopnea/PND. Respiratory: No cough/sputum, dyspnea, wheezing. Gastointestinal: No dysphagia, reflux. No n/v. No constipation/diarrhea. No melena/rectal bleeding. Genitourinary: No dysuria, urinary hesitancy, nocturia, hematuria. No incontinence. Musculoskeletal: No joint pain/stiffness. No muscle pain/tenderness. Endo:  No heat/cold intolerance, no polyuria/polydypsia. Heme: No h/o anemia. No easy bleeding/bruising. Allergy/Immunology: No seasonal rhinitis. Denies frequent colds, sinus/ear infections. Neurological: No seizures/numbness/weakness. No paresthesias. Psychiatric:  No depression, anxiety. The problem list was updated as a part of today's visit. 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 SH:  Social History     Tobacco Use    Smoking status: Former Smoker     Last attempt to quit: 1989     Years since quittin.7    Smokeless tobacco: Never Used   Substance Use Topics    Alcohol use: Yes     Alcohol/week: 34.0 standard drinks     Types: 14 Glasses of wine, 14 Cans of beer, 6 Standard drinks or equivalent per week    Drug use: No       Medications/Allergies:  Current Outpatient Medications on File Prior to Visit   Medication Sig Dispense Refill    finasteride (PROSCAR) 5 mg tablet Take 1 Tab by mouth daily. 90 Tab 3    lisinopril (PRINIVIL, ZESTRIL) 5 mg tablet TAKE 1 TABLET DAILY 90 Tab 4    clonazePAM (KLONOPIN) 0.5 mg tablet Take 1 Tab by mouth nightly. Max Daily Amount: 0.5 mg. 90 Tab 1    tamsulosin (FLOMAX) 0.4 mg capsule TAKE 2 CAPSULES DAILY 180 Cap 3    Cetirizine (ZYRTEC) 10 mg cap Take  by mouth.       ammonium lactate (AMLACTIN) 12 % topical cream       HYDROcodone-acetaminophen (NORCO) 5-325 mg per tablet TAKE 1 TABLET BY MOUTH EVERY 4 TO 6 HOURS AS NEEDED FOR PAIN  0    triamcinolone acetonide (KENALOG) 0.1 % topical cream       NARCAN 4 mg/actuation nasal spray USE 1 SPRAY IN EACH NOSTRIL MAY REPEAT 2 TO 3 MINUTES UNTIL RMERGENCY ARRIVES  0    triamterene-hydroCHLOROthiazide (MAXZIDE) 37.5-25 mg per tablet TAKE 1 TABLET DAILY 90 Tab 4    rosuvastatin (CRESTOR) 20 mg tablet TAKE 1 TABLET NIGHTLY 90 Tab 4    bimatoprost (LUMIGAN) 0.01 % ophthalmic drops Administer 1 Drop to both eyes every evening.  pantoprazole (PROTONIX) 40 mg tablet TAKE 1 TABLET DAILY 90 Tab 3    ipratropium (ATROVENT) 42 mcg (0.06 %) nasal spray 2 Sprays by Both Nostrils route four (4) times daily as needed for Rhinitis. 45 mL 1    fluticasone (FLONASE) 50 mcg/actuation nasal spray USE 2 SPRAYS IN EACH NOSTRIL DAILY 48 g 2    MULTIVITAMIN W-MINERALS/LUTEIN (CENTRUM SILVER PO) Take  by mouth.  aspirin 81 mg tablet Take 81 mg by mouth. No current facility-administered medications on file prior to visit. Allergies   Allergen Reactions    Sulfa (Sulfonamide Antibiotics) Itching       Objective:  Visit Vitals  /74 (BP 1 Location: Left arm, BP Patient Position: Sitting)   Pulse 60   Temp 97.1 °F (36.2 °C) (Oral)   Resp 18   Ht 6' 4\" (1.93 m)   Wt 240 lb (108.9 kg)   SpO2 97%   BMI 29.21 kg/m²      Constitutional: Well developed, nourished, no distress, alert   HENT: Exterior ears and tympanic membranes normal bilaterally. Supple neck. No thyromegaly or lymphadenopathy. Oropharynx clear and moist mucous membranes. Eyes: Conjunctiva normal. PERRL. Eyelids normal.   CV: S1, S2.  RRR. No murmurs/rubs. No edema. Pulm: No abnormalities on inspection. Clear to auscultation bilaterally. No wheezing/rhonchi. Normal effort. GI: Soft, nontender, nondistended. Normal active bowel sounds. MS: Gait normal.  Joints without deformity/tenderness. Neuro: A/O x 3. No focal motor or sensory deficits. Speech normal.   Skin: No lesions/rashes on inspection. Psych: Appropriate affect, judgement and insight.   Short-term memory intact. Labwork and Ancillary Studies:    CBC w/Diff  Lab Results   Component Value Date/Time    WBC 4.8 12/19/2019 08:00 AM    HGB 15.4 12/19/2019 08:00 AM    PLATELET 301 49/50/8542 08:00 AM         Basic Metabolic Profile/LFTs  Lab Results   Component Value Date/Time    Sodium 140 12/19/2019 08:00 AM    Potassium 4.1 12/19/2019 08:00 AM    Chloride 107 12/19/2019 08:00 AM    CO2 30 12/19/2019 08:00 AM    Anion gap 3 12/19/2019 08:00 AM    Glucose 99 12/19/2019 08:00 AM    BUN 22 (H) 12/19/2019 08:00 AM    Creatinine 1.26 12/19/2019 08:00 AM    BUN/Creatinine ratio 17 12/19/2019 08:00 AM    GFR est AA >60 12/19/2019 08:00 AM    GFR est non-AA 58 (L) 12/19/2019 08:00 AM    Calcium 8.9 12/19/2019 08:00 AM      Lab Results   Component Value Date/Time    ALT (SGPT) 38 12/19/2019 08:00 AM    AST (SGOT) 22 12/19/2019 08:00 AM    Alk.  phosphatase 81 12/19/2019 08:00 AM    Bilirubin, total 0.4 12/19/2019 08:00 AM       Cholesterol  Lab Results   Component Value Date/Time    Cholesterol, total 194 12/19/2019 08:00 AM    HDL Cholesterol 59 12/19/2019 08:00 AM    LDL, calculated 114.4 (H) 12/19/2019 08:00 AM    Triglyceride 103 12/19/2019 08:00 AM    CHOL/HDL Ratio 3.3 12/19/2019 08:00 AM

## 2020-01-09 NOTE — PROGRESS NOTES
Isaias Gilbert is a 59 y.o. male (: 1955) presenting to address:    Chief Complaint   Patient presents with    Physical       Vitals:    20 1306   BP: 122/74   Pulse: 60   Resp: 18   Temp: 97.1 °F (36.2 °C)   TempSrc: Oral   SpO2: 97%   Weight: 240 lb (108.9 kg)   Height: 6' 4\" (1.93 m)   PainSc:   0 - No pain       Hearing/Vision:   No exam data present    Learning Assessment:     Learning Assessment 2015   PRIMARY LEARNER Patient   HIGHEST LEVEL OF EDUCATION - PRIMARY LEARNER  SOME COLLEGE   PRIMARY LANGUAGE ENGLISH   LEARNER PREFERENCE PRIMARY LISTENING   ANSWERED BY patient   RELATIONSHIP SELF     Depression Screening:     3 most recent PHQ Screens 2020   Little interest or pleasure in doing things Not at all   Feeling down, depressed, irritable, or hopeless Not at all   Total Score PHQ 2 0     Fall Risk Assessment:     Fall Risk Assessment, last 12 mths 2020   Able to walk? Yes   Fall in past 12 months? No   Fall with injury? -   Number of falls in past 12 months -   Fall Risk Score -     Abuse Screening:     Abuse Screening Questionnaire 2020   Do you ever feel afraid of your partner? N   Are you in a relationship with someone who physically or mentally threatens you? N   Is it safe for you to go home? Y     Coordination of Care Questionaire:   1. Have you been to the ER, urgent care clinic since your last visit? Hospitalized since your last visit? NO    2. Have you seen or consulted any other health care providers outside of the [address] [place] [address] since your last visit? Include any pap smears or colon screening. YES    Advanced Directive:   1. Do you have an Advanced Directive? YES    2. Would you like information on Advanced Directives? NO    shingrix  Immunization/s administered 2020 by Bere Belcher with patient's consent. Patient tolerated procedure well. No reactions noted.

## 2020-01-15 ENCOUNTER — HOSPITAL ENCOUNTER (OUTPATIENT)
Dept: PHYSICAL THERAPY | Age: 65
Discharge: HOME | End: 2020-01-15
Payer: OTHER GOVERNMENT

## 2020-01-15 PROCEDURE — 97110 THERAPEUTIC EXERCISES: CPT

## 2020-01-15 NOTE — PROGRESS NOTES
PHYSICAL THERAPY - DAILY TREATMENT NOTE    Patient Name: Ángel Lobo        Date: 1/15/2020  : 1955   yes Patient  Verified  Visit #:     Insurance: Payor:  / Plan: Maerick Mantle / Product Type:  /      In time: 10:05 Out time: 10:35   Total Treatment Time: 30     Medicare/[facility] Time Tracking (below)   Total Timed Codes (min):  na 1:1 Treatment Time:  na     TREATMENT AREA =  Low back pain [M54.5]  SUBJECTIVE  Pain Level (on 0 to 10 scale):  0  / 10   Medication Changes/New allergies or changes in medical history, any new surgeries or procedures?    no  If yes, update Summary List   Subjective Functional Status/Changes:  []  No changes reported     Its been surprisingly good. No real twinge           OBJECTIVE  23 min Therapeutic Exercise:  [x]???????????  See flow sheet   Rationale:      increase ROM, increase strength and improve coordination to improve the patients ability to perform ADLs      5 min Manual Therapy: shot gun  Tech, L3 FRS quita   Rationale:      decrease pain, increase ROM and increase tissue extensibility to improve patient's ability to perform ADLs     Dry Needling Procedure Note     Dry Needle Session Number:  17  Procedure:    An intramuscular manual therapy (dry needling) and a neuro-muscular re-education treatment was done to deactivate myofascial trigger points, with a 15/30 gauge solid filament needle, under aseptic technique.     Indication(s):        [x]??????????? Muscle spasms      []??????????? Myalgia/Myositis  []??????????? Muscle cramps                                         []??????????? Muscle imbalances         []??????????? TMD (TMJ)          []??????????? Myofascial pain & dysfunction                 []??????????? Other: __     Chart reviewed for the following:  Gretchen TRAMMELL PT, have reviewed the medical history, summary list and precautions/contraindications for Memo Walton.     TIME OUT performed immediately prior to start of procedure:  10:07(enter time the timeout was completed)  Gretchen TRAMMELL PT, have performed the following reviews on Memo Walton prior to the start of the session:      [x]??????????? Patient was identified by name and date of birth    [x]??????????? Agreement on all muscles being treated was verified   [x]??????????? Purpose of dry needling, side effects, possible complications, risks and benefits were explained to the patient   [x]??????????? Procedure site(s) verified  [x]??????????? Patient was positioned for comfort and draped for privacy  [x]??????????? Informed Consent was signed (initial visit) and verified verbally (subsequent visits)  [x]??????????? Patient was instructed on the need to report the use of blood thinners and/or immunosuppressant medications  [x]??????????? How to respond to possible adverse effects of treatment  [x]??????????? Self treatment of post needling soreness: ice, heat (moist heat, heat wraps) and stretching  [x]??????????? Opportunity was given to ask any questions, all questions were answered     Treatment:  The following muscles were treated today:     Right: L/S Para/multifidus,    Left: L/S Para/multifidus,       Patients response to todays treatment:   []???????????  LTRs               []???????????  Muscle Relaxation                      []???????????  Pain Relief                     []???????????  Decreased Tinnitus  []???????????  Decreased HAs           []???????????  Post needling soreness  []???????????  Increased ROM              []???????????  Other:        Actual needle insertion time is not billed          Billed With/As:   [] TE   [] TA   [] Neuro   [] Self Care Patient Education: [x] Review HEP    [] Progressed/Changed HEP based on:   [] positioning   [] body mechanics   [] transfers   [] heat/ice application    [] other:      Other Objective/Functional Measures:    Cont to have increased soft tissue tension noted at L para     Post Treatment Pain Level (on 0 to 10) scale:   0  / 10     ASSESSMENT  Assessment/Changes in Function:     No c/o LB tightness with RDL     []  See Progress Note/Recertification   Patient will continue to benefit from skilled PT services to modify and progress therapeutic interventions, address functional mobility deficits and address ROM deficits to attain remaining goals.    Progress toward goals / Updated goals:    Progressing well with function and pain reduction      PLAN  [x]  Upgrade activities as tolerated yes Continue plan of care   []  Discharge due to :    []  Other:      Therapist: Hali Correia PT    Date: 1/15/2020 Time: 6:30 AM     Future Appointments   Date Time Provider Ame Alba   1/15/2020 10:00 AM Jesse Rubin, PT [facility]   1/22/2020 10:00 AM Jesse Rubin, PT [facility]   4/10/2020  8:30 AM Yasmany Ramsey MD [facility]

## 2020-01-22 ENCOUNTER — HOSPITAL ENCOUNTER (OUTPATIENT)
Dept: PHYSICAL THERAPY | Age: 65
Discharge: HOME | End: 2020-01-22
Payer: OTHER GOVERNMENT

## 2020-01-22 PROCEDURE — 97140 MANUAL THERAPY 1/> REGIONS: CPT

## 2020-01-22 PROCEDURE — 97110 THERAPEUTIC EXERCISES: CPT

## 2020-01-22 NOTE — PROGRESS NOTES
PHYSICAL THERAPY - DAILY TREATMENT NOTE    Patient Name: Kamar Reveal        Date: 2020  : 1955   yes Patient  Verified  Visit #:     Insurance: Payor: FARIDA / Plan: Guzman Maynard 74 / Product Type:  /      In time: 10:00 Out time: 10:25   Total Treatment Time: 25     Medicare/[facility] Time Tracking (below)   Total Timed Codes (min):  na 1:1 Treatment Time:  na     TREATMENT AREA =  Low back pain [M54.5]  SUBJECTIVE  Pain Level (on 0 to 10 scale):  0  / 10   Medication Changes/New allergies or changes in medical history, any new surgeries or procedures?    no  If yes, update Summary List   Subjective Functional Status/Changes:  []  No changes reported     Its been good. No pain           OBJECTIVE  15 min Therapeutic Exercise:  [x]????????????  See flow sheet   Rationale:      increase ROM, increase strength and improve coordination to improve the patients ability to perform ADLs      8 min Manual Therapy: shot gun  Tech, PA L5    Rationale:      decrease pain, increase ROM and increase tissue extensibility to improve patient's ability to perform ADLs     Dry Needling Procedure Note     Dry Needle Session Number:  18  Procedure:    An intramuscular manual therapy (dry needling) and a neuro-muscular re-education treatment was done to deactivate myofascial trigger points, with a 15/30 gauge solid filament needle, under aseptic technique.     Indication(s):        [x]???????????? Muscle spasms      []???????????? Myalgia/Myositis  []???????????? Muscle cramps                                         []???????????? Muscle imbalances         []???????????? TMD (TMJ)          []???????????? Myofascial pain & dysfunction                 []???????????? Other: __     Chart reviewed for the following:  Grethcen TRAMMELL PT, have reviewed the medical history, summary list and precautions/contraindications for Memo Walton.     TIME OUT performed immediately prior to start of procedure:  10:07(enter time the timeout was completed)  IGretchen, PT, have performed the following reviews on Memo Walton prior to the start of the session:      [x]???????????? Patient was identified by name and date of birth    [x]???????????? Agreement on all muscles being treated was verified   [x]???????????? Purpose of dry needling, side effects, possible complications, risks and benefits were explained to the patient   [x]???????????? Procedure site(s) verified  [x]???????????? Patient was positioned for comfort and draped for privacy  [x]???????????? Informed Consent was signed (initial visit) and verified verbally (subsequent visits)  [x]???????????? Patient was instructed on the need to report the use of blood thinners and/or immunosuppressant medications  [x]???????????? How to respond to possible adverse effects of treatment  [x]???????????? Self treatment of post needling soreness: ice, heat (moist heat, heat wraps) and stretching  [x]???????????? Opportunity was given to ask any questions, all questions were answered     Treatment:  The following muscles were treated today:     Right: L/S Para/multifidus,    Left: L/S Para/multifidus,       Patients response to todays treatment:   []????????????  LTRs               []????????????  Muscle Relaxation                      []????????????  Pain Relief                     []????????????  Decreased Tinnitus  []????????????  Decreased HAs           []????????????  Post needling soreness  []????????????  Increased ROM              []????????????  Other:        Actual needle insertion time is not billed             Billed With/As:   [] TE   [] TA   [] Neuro   [] Self Care Patient Education: [x] Review HEP    [] Progressed/Changed HEP based on:   [] positioning   [] body mechanics   [] transfers   [] heat/ice application    [] other:      Other Objective/Functional Measures:    FOTO = 94  GROC = +7     Post Treatment Pain Level (on 0 to 10) scale:   0  / 10     ASSESSMENT  Assessment/Changes in Function:     Independent with all exz     []  See Progress Note/Recertification      Progress toward goals / Updated goals:    Met all goals      PLAN  [x]  Upgrade activities as tolerated yes Continue plan of care   []  Discharge due to :    []  Other:      Therapist: Lan Wilkerson PT    Date: 1/22/2020 Time: 6:30 AM     Future Appointments   Date Time Provider Ame Alba   1/22/2020 10:00 AM ELIA Moya [facility]   4/10/2020  8:30 AM Houston Magallanes MD [facility]

## 2020-01-24 ENCOUNTER — TELEPHONE (OUTPATIENT)
Dept: FAMILY MEDICINE CLINIC | Age: 65
End: 2020-01-24

## 2020-01-24 RX ORDER — PHENAZOPYRIDINE HYDROCHLORIDE 100 MG/1
100 TABLET, FILM COATED ORAL
Qty: 9 TAB | Refills: 0 | Status: SHIPPED | OUTPATIENT
Start: 2020-01-24 | End: 2020-01-27

## 2020-01-24 NOTE — TELEPHONE ENCOUNTER
Pt is requesting to be called back as soon as possible, he is trying to talk to someone in regards to his urine roblem. He did send a [facility] message to Dr Thelma Snider however he prefers to be called back.  Please advise

## 2020-01-24 NOTE — TELEPHONE ENCOUNTER
Returned pt call. He had a negative culture at urology but is still have bladder spasms. States he is going out of town [facility] to see his mom who is on hospice. pcp sent pyridium to pharmacy. Pt will need to call urology for further eval is still having sx.

## 2020-01-31 NOTE — PROGRESS NOTES
[address] [place] [address] [address], [address] [place] [address] - Phone: [phone]  Fax: [phone] SUMMARY  Patient Name: Deanna Mcnair : 1955   Treatment/Medical Diagnosis: Low back pain [M54.5]   Referral Source: Padmini Coyne DO       Date of Initial Visit: 10/17/19 Attended Visits: 19 Missed Visits: 0      Karolyn Ghosh 37 been seen at our clinic 1-2x/wk for a total of 19 visits.  Pt treatment has consisted of  therapeutic exercise for thoracic ROM, hip/core strengthening, and manual therapy(jt mobilization and deep tissue mobilization)     CURRENT STATUS  Pt has had a good tolerance to physical therapy treatment.  He reports significant improved ability to perform ADLs and has not  Complained of any twinge in a few weeks. We believe that he will be able to continue to manage his symptom independently at this point.                    Goal/Measure of Progress Goal Met? 1.  Pt will be independent with advanced HEP in preparation for DC   Status at last Eval: na Current Status: Independent with all ex yes        RECOMMENDATIONS  Discharge from physical therapy treatment with HEP. Specifics:   If you have any questions/comments please contact us directly at [phone]. Thank you for allowing us to assist in the care of your patient.     Therapist Signature: Duke Hidalgo DPT, FAN, SCS, CSCS Date: 2020     Time: 11:02 AM

## 2020-03-19 LAB — PSA, EXTERNAL: 10.12

## 2020-04-29 ENCOUNTER — VIRTUAL VISIT (OUTPATIENT)
Dept: FAMILY MEDICINE CLINIC | Age: 65
End: 2020-04-29

## 2020-04-29 DIAGNOSIS — E78.2 MIXED HYPERCHOLESTEROLEMIA AND HYPERTRIGLYCERIDEMIA: ICD-10-CM

## 2020-04-29 DIAGNOSIS — G47.52 REM SLEEP BEHAVIOR DISORDER: Primary | ICD-10-CM

## 2020-04-29 DIAGNOSIS — I10 BENIGN ESSENTIAL HTN: Primary | ICD-10-CM

## 2020-04-29 RX ORDER — CLONAZEPAM 0.5 MG/1
0.5 TABLET ORAL
Qty: 90 TAB | Refills: 1 | Status: SHIPPED | OUTPATIENT
Start: 2020-04-29 | End: 2020-10-27 | Stop reason: SDUPTHER

## 2020-04-29 NOTE — PROGRESS NOTES
Katarina Tran is a 59 y.o. male who was seen by synchronous (real-time) audio-video technology on 4/29/2020. Consent: Katarina Tran, who was seen by synchronous (real-time) audio-video technology, and/or his healthcare decision maker, is aware that this patient-initiated, Telehealth encounter on 4/29/2020 is a billable service, with coverage as determined by his insurance carrier. He is aware that he may receive a bill and has provided verbal consent to proceed: Yes. Assessment & Plan:   Diagnoses and all orders for this visit:    1. REM sleep behavior disorder  -     clonazePAM (KlonoPIN) 0.5 mg tablet; Take 1 Tab by mouth nightly. Max Daily Amount: 0.5 mg.      F/u in 6 mos  [facility] and is in accordance with patient's prescriptions           Subjective:   Katarina Tran is a 59 y.o. male who was seen for Follow Up Chronic Condition    Parasomnia and REM sleep behavior d/o - on klonopin. Prior to Admission medications    Medication Sig Start Date End Date Taking? Authorizing Provider   cholecalciferol (VITAMIN D3) (1000 Units /25 mcg) tablet Take  by mouth daily. Provider, Historical   oxybutynin chloride XL (DITROPAN XL) 10 mg CR tablet Take 1 Tab by mouth daily. 3/9/20   Yoli Del Cid MD   ALPHAGAN P 0.1 % ophthalmic solution  2/26/20   Provider, Historical   cyclobenzaprine (FLEXERIL) 10 mg tablet Take 1 Tab by mouth three (3) times daily as needed for Muscle Spasm(s). Indications: muscle spasm 1/9/20   Aleksandr Hussein MD   finasteride (PROSCAR) 5 mg tablet Take 1 Tab by mouth daily. 1/6/20   Yoli Del Cid MD   lisinopril (PRINIVIL, ZESTRIL) 5 mg tablet TAKE 1 TABLET DAILY 12/11/19   Aleksandr Hussein MD   tamsulosin [facility]) 0.4 mg capsule TAKE 2 CAPSULES DAILY 10/28/19   Yoli Del Cid MD   Cetirizine (ZYRTEC) 10 mg cap Take  by mouth.     Provider, Historical   ammonium lactate (AMLACTIN) 12 % topical cream  10/10/19   Provider, Historical   HYDROcodone-acetaminophen ([facility]) 5-325 mg per tablet TAKE 1 TABLET BY MOUTH EVERY 4 TO 6 HOURS AS NEEDED FOR PAIN 8/14/19   Provider, Historical   triamcinolone acetonide (KENALOG) 0.1 % topical cream  10/3/19   Provider, Historical   NARCAN 4 mg/actuation nasal spray USE 1 SPRAY IN EACH NOSTRIL MAY REPEAT 2 TO 3 MINUTES UNTIL [facility] ARRIVES 8/13/19   Provider, Historical   triamterene-hydroCHLOROthiazide (MAXZIDE) 37.5-25 mg per tablet TAKE 1 TABLET DAILY 9/23/19   Mónica Henry MD   rosuvastatin (CRESTOR) 20 mg tablet TAKE 1 TABLET NIGHTLY 8/30/19   Dorothy Rubin MD   bimatoprost (LUMIGAN) 0.01 % ophthalmic drops Administer 1 Drop to both eyes every evening. Provider, Historical   pantoprazole (PROTONIX) 40 mg tablet TAKE 1 TABLET DAILY 7/8/19   Ksenia Rojas MD   ipratropium (ATROVENT) 42 mcg (0.06 %) nasal spray 2 Sprays by Both Nostrils route four (4) times daily as needed for Rhinitis. 10/25/18   Mónica Henry MD   fluticasone (FLONASE) 50 mcg/actuation nasal spray USE 2 SPRAYS IN [facility] NOSTRIL DAILY 10/25/18   Dorothy Rubin MD   MULTIVITAMIN W-MINERALS/LUTEIN (CENTRUM SILVER PO) Take  by mouth. Provider, Historical   aspirin 81 mg tablet Take 81 mg by mouth. 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 Negative for dysuria and urgency. Psychiatric/Behavioral: Negative for depression. The patient is not nervous/anxious.         Objective:   Vital Signs: (As obtained by patient/caregiver at home)  There were no vitals taken for this visit. [INSTRUCTIONS:  \"[x]\" Indicates a positive item  \"[]\" Indicates a negative item  -- DELETE ALL ITEMS NOT EXAMINED]    Constitutional: [x] Appears well-developed and well-nourished [x] No apparent distress      [] Abnormal -     Mental status: [x] Alert and awake  [x] Oriented to person/place/time [x] Able to follow commands    [] Abnormal -     Eyes:   EOM    [x]  Normal    [] Abnormal -   Sclera  [x]  Normal    [] Abnormal -          Discharge [x]  None visible   [] Abnormal -     HENT: [x] Normocephalic, atraumatic  [] Abnormal -   [x] Mouth/Throat: Mucous membranes are moist    External Ears [x] Normal  [] Abnormal -    Neck: [x] No visualized mass [] Abnormal -     Pulmonary/Chest: [x] Respiratory effort normal   [x] No visualized signs of difficulty breathing or respiratory distress        [] Abnormal -      Musculoskeletal:   [] Normal gait with no signs of ataxia         [] Normal range of motion of neck        [] Abnormal -     Neurological:        [x] No Facial Asymmetry (Cranial nerve 7 motor function) (limited exam due to video visit)          [x] No gaze palsy        [] Abnormal -          Skin:        [x] No significant exanthematous lesions or discoloration noted on facial skin         [] Abnormal -            Psychiatric:       [x] Normal Affect [] Abnormal -        [x] No Hallucinations    Other pertinent observable physical exam findings:-        We discussed the expected course, resolution and complications of the diagnosis(es) in detail. Medication risks, benefits, costs, interactions, and alternatives were discussed as indicated. I advised him to contact the office if his condition worsens, changes or fails to improve as anticipated. He expressed understanding with the diagnosis(es) and plan. Osmar Joshi is a 59 y.o. male who was evaluated by a video visit encounter for concerns as above.  Patient identification was verified prior to start of the visit. A caregiver was present when appropriate. Due to this being a TeleHealth encounter (During [address] emergency), evaluation of the following organ systems was limited: Vitals/Constitutional/EENT/Resp/CV/GI//MS/Neuro/Skin/Heme-Lymph-Imm. Pursuant to the emergency declaration under the [address] [place] [address], [facility] waiver authority and the [employer] and [employer] Act, this Virtual  Visit was conducted, with patient's (and/or legal guardian's) consent, to reduce the patient's risk of exposure to COVID-19 and provide necessary medical care. Services were provided through a video synchronous discussion virtually to substitute for in-person clinic visit. Patient and provider were located at their individual homes.       Irwin Bland MD

## 2020-05-01 DIAGNOSIS — G47.52 REM SLEEP BEHAVIOR DISORDER: ICD-10-CM

## 2020-08-24 DIAGNOSIS — E78.2 MIXED HYPERCHOLESTEROLEMIA AND HYPERTRIGLYCERIDEMIA: ICD-10-CM

## 2020-08-24 RX ORDER — ROSUVASTATIN CALCIUM 20 MG/1
20 TABLET, COATED ORAL
Qty: 90 TAB | Refills: 3 | Status: SHIPPED | OUTPATIENT
Start: 2020-08-24

## 2020-08-24 RX ORDER — PANTOPRAZOLE SODIUM 40 MG/1
40 TABLET, DELAYED RELEASE ORAL DAILY
Qty: 90 TAB | Refills: 3 | Status: SHIPPED | OUTPATIENT
Start: 2020-08-24

## 2020-10-27 ENCOUNTER — VIRTUAL VISIT (OUTPATIENT)
Dept: FAMILY MEDICINE CLINIC | Age: 65
End: 2020-10-27
Payer: OTHER GOVERNMENT

## 2020-10-27 DIAGNOSIS — R97.20 ELEVATED PROSTATE SPECIFIC ANTIGEN (PSA): ICD-10-CM

## 2020-10-27 DIAGNOSIS — G47.52 REM SLEEP BEHAVIOR DISORDER: Primary | ICD-10-CM

## 2020-10-27 DIAGNOSIS — I10 BENIGN ESSENTIAL HTN: ICD-10-CM

## 2020-10-27 DIAGNOSIS — Z79.899 HIGH RISK MEDICATION USE: ICD-10-CM

## 2020-10-27 DIAGNOSIS — E78.2 MIXED HYPERCHOLESTEROLEMIA AND HYPERTRIGLYCERIDEMIA: ICD-10-CM

## 2020-10-27 PROCEDURE — 99214 OFFICE O/P EST MOD 30 MIN: CPT | Performed by: INTERNAL MEDICINE

## 2020-10-27 RX ORDER — CLONAZEPAM 0.5 MG/1
0.5 TABLET ORAL
Qty: 90 TAB | Refills: 1 | Status: SHIPPED | OUTPATIENT
Start: 2020-10-27 | End: 2021-01-04 | Stop reason: SDUPTHER

## 2020-10-27 NOTE — PROGRESS NOTES
Stiven Núñez is a 59 y.o. male who was seen by synchronous (real-time) audio-video technology on 10/27/2020 for Follow Up Chronic Condition      Assessment & Plan:   Diagnoses and all orders for this visit:    1. REM sleep behavior disorder- refilled. -     clonazePAM (KlonoPIN) 0.5 mg tablet; Take 1 Tab by mouth nightly. Max Daily Amount: 0.5 mg.  [facility]  reviewed and is in accordance with patient's prescriptions     2. Mixed hypercholesterolemia and hypertriglyceridemia-on statin  -     METABOLIC PANEL, COMPREHENSIVE; Future  -     LIPID PANEL; Future    3. Benign essential HTN-cont meds. -     CBC W/O DIFF; Future  -     METABOLIC PANEL, COMPREHENSIVE; Future  -     LIPID PANEL; Future    4. High risk medication use  -     TOXASSURE SELECT 13 (MW); Future    Subjective:   Parasomnia - on klonopin. Sx controlled. No side effects. HLD - on statin. No side effects. Is working with urology for elevated psa. Urine neg.    htn - bp controlled. Due for labs. Prior to Admission medications    Medication Sig Start Date End Date Taking? Authorizing Provider   tamsulosin (FLOMAX) 0.4 mg capsule TAKE 2 CAPSULES DAILY 10/22/20   Aida Eaton MD   pantoprazole (PROTONIX) 40 mg tablet Take 1 Tab by mouth daily. 8/24/20   Nnamdi Davies MD   rosuvastatin (CRESTOR) 20 mg tablet Take 1 Tab by mouth nightly. 8/24/20   Nnamdi Davies MD   clonazePAM (KlonoPIN) 0.5 mg tablet Take 1 Tab by mouth nightly. Max Daily Amount: 0.5 mg. 4/29/20   Nnamdi Davies MD   cholecalciferol (VITAMIN D3) (1000 Units /25 mcg) tablet Take  by mouth daily. Provider, Historical   oxybutynin chloride XL (DITROPAN XL) 10 mg CR tablet Take 1 Tab by mouth daily. 3/9/20   Aida Eaton MD   ALPHAGAN P 0.1 % ophthalmic solution  2/26/20   Provider, Historical   cyclobenzaprine (FLEXERIL) 10 mg tablet Take 1 Tab by mouth three (3) times daily as needed for Muscle Spasm(s).  Indications: muscle spasm 1/9/20   Nnamdi Davies MD lisinopril (PRINIVIL, ZESTRIL) 5 mg tablet TAKE 1 TABLET DAILY 12/11/19   [facility] Nahum Rose MD   Cetirizine (ZYRTEC) 10 mg cap Take  by mouth. Provider, Historical   ammonium lactate (AMLACTIN) 12 % topical cream  10/10/19   Provider, Historical   HYDROcodone-acetaminophen (NORCO) 5-325 mg per tablet TAKE 1 TABLET BY MOUTH EVERY 4 TO 6 HOURS AS NEEDED FOR PAIN 8/14/19   Provider, Historical   triamcinolone acetonide (KENALOG) 0.1 % topical cream  10/3/19   Provider, Historical   NARCAN 4 mg/actuation nasal spray USE 1 SPRAY IN EACH NOSTRIL MAY REPEAT 2 TO 3 MINUTES UNTIL [facility] ARRIVES 8/13/19   Provider, Historical   triamterene-hydroCHLOROthiazide (MAXZIDE) 37.5-25 mg per tablet TAKE 1 TABLET DAILY 9/23/19   Beatriz Rubin MD   bimatoprost (LUMIGAN) 0.01 % ophthalmic drops Administer 1 Drop to both eyes every evening. Provider, Historical   ipratropium (ATROVENT) 42 mcg (0.06 %) nasal spray 2 Sprays by Both Nostrils route four (4) times daily as needed for Rhinitis. 10/25/18   Gricelda Lang MD   fluticasone (FLONASE) 50 mcg/actuation nasal spray USE 2 SPRAYS IN [facility] NOSTRIL DAILY 10/25/18   Beatriz Rubin MD   MULTIVITAMIN W-MINERALS/LUTEIN (CENTRUM SILVER PO) Take  by mouth. Provider, Historical   aspirin 81 mg tablet Take 81 mg by mouth. 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 Respiratory: Negative. Cardiovascular: Negative. Psychiatric/Behavioral: Negative. Objective:   No flowsheet data found.      [INSTRUCTIONS:  \"[x]\" Indicates a positive item  \"[]\" Indicates a negative item  -- DELETE ALL ITEMS NOT EXAMINED]    Constitutional: [x] Appears well-developed and well-nourished [x] No apparent distress      [] Abnormal -     Mental status: [x] Alert and awake  [x] Oriented to person/place/time [x] Able to follow commands    [] Abnormal -     Eyes:   EOM    [x]  Normal    [] Abnormal -   Sclera  [x]  Normal    [] Abnormal -          Discharge [x]  None visible   [] Abnormal -     HENT: [x] Normocephalic, atraumatic  [] Abnormal -   [x] Mouth/Throat: Mucous membranes are moist    External Ears [x] Normal  [] Abnormal -    Neck: [x] No visualized mass [] Abnormal -     Pulmonary/Chest: [x] Respiratory effort normal   [x] No visualized signs of difficulty breathing or respiratory distress        [] Abnormal -      Musculoskeletal:   [] Normal gait with no signs of ataxia         [] Normal range of motion of neck        [] Abnormal -     Neurological:        [] No Facial Asymmetry (Cranial nerve 7 motor function) (limited exam due to video visit)          [] No gaze palsy        [] Abnormal -          Skin:        [] No significant exanthematous lesions or discoloration noted on facial skin         [] Abnormal -            Psychiatric:       [x] Normal Affect [] Abnormal -        [x] No Hallucinations    Other pertinent observable physical exam findings:-        We discussed the expected course, resolution and complications of the diagnosis(es) in detail. Medication risks, benefits, costs, interactions, and alternatives were discussed as indicated. I advised him to contact the office if his condition worsens, changes or fails to improve as anticipated. He expressed understanding with the diagnosis(es) and plan.        Festus Ellington, who was evaluated through a patient-initiated, synchronous (real-time) audio-video encounter, and/or his healthcare decision maker, is aware that it is a billable service, with coverage as determined by his insurance carrier. He provided verbal consent to proceed: Yes, and patient identification was verified. It was conducted pursuant to the emergency declaration under the [address] [place] [address] and the [employer] Act. A caregiver was present when appropriate. Ability to conduct physical exam was limited. The patient was at home.       Kary Hernandez MD

## 2020-12-14 DIAGNOSIS — I10 BENIGN ESSENTIAL HTN: ICD-10-CM

## 2020-12-14 RX ORDER — TRIAMTERENE/HYDROCHLOROTHIAZID 37.5-25 MG
TABLET ORAL
Qty: 90 TAB | Refills: 3 | Status: SHIPPED | OUTPATIENT
Start: 2020-12-14

## 2020-12-23 LAB
A-G RATIO,AGRAT: 1.4 RATIO (ref 1.1–2.6)
ALBUMIN SERPL-MCNC: 4.3 G/DL (ref 3.5–5)
ALP SERPL-CCNC: 80 U/L (ref 40–125)
ALT SERPL-CCNC: 32 U/L (ref 5–40)
ANION GAP SERPL CALC-SCNC: 12.6 MMOL/L (ref 3–15)
AST SERPL W P-5'-P-CCNC: 30 U/L (ref 10–37)
BILIRUB SERPL-MCNC: 0.5 MG/DL (ref 0.2–1.2)
BUN SERPL-MCNC: 23 MG/DL (ref 6–22)
CALCIUM SERPL-MCNC: 9.5 MG/DL (ref 8.4–10.5)
CHLORIDE SERPL-SCNC: 103 MMOL/L (ref 98–110)
CHOLEST SERPL-MCNC: 175 MG/DL (ref 110–200)
CO2 SERPL-SCNC: 25 MMOL/L (ref 20–32)
CREAT SERPL-MCNC: 1.1 MG/DL (ref 0.8–1.6)
ERYTHROCYTE [DISTWIDTH] IN BLOOD BY AUTOMATED COUNT: 12.8 % (ref 10–15.5)
GFRAA, 66117: >60
GFRNA, 66118: >60
GLOBULIN,GLOB: 3.1 G/DL (ref 2–4)
GLUCOSE SERPL-MCNC: 88 MG/DL (ref 70–99)
HCT VFR BLD AUTO: 48 % (ref 37.8–52.2)
HDLC SERPL-MCNC: 2.9 MG/DL (ref 0–5)
HDLC SERPL-MCNC: 60 MG/DL
HGB BLD-MCNC: 16 G/DL (ref 12.6–17.1)
LDL/HDL RATIO,LDHD: 1.6
LDLC SERPL CALC-MCNC: 97 MG/DL (ref 50–99)
MCH RBC QN AUTO: 30 PG (ref 26–34)
MCHC RBC AUTO-ENTMCNC: 33 G/DL (ref 31–36)
MCV RBC AUTO: 91 FL (ref 80–95)
NON-HDL CHOLESTEROL, 011976: 115 MG/DL
PLATELET # BLD AUTO: 166 K/UL (ref 140–440)
PMV BLD AUTO: 10.1 FL (ref 9–13)
POTASSIUM SERPL-SCNC: 4 MMOL/L (ref 3.5–5.5)
PROT SERPL-MCNC: 7.4 G/DL (ref 6.2–8.1)
PSA SERPL-MCNC: 20.6 NG/ML
RBC # BLD AUTO: 5.27 M/UL (ref 3.8–5.8)
REPORT SUMMARY: NORMAL
SODIUM SERPL-SCNC: 141 MMOL/L (ref 133–145)
TRIGL SERPL-MCNC: 92 MG/DL (ref 40–149)
VLDLC SERPL CALC-MCNC: 18 MG/DL (ref 8–30)
WBC # BLD AUTO: 5.9 K/UL (ref 4–11)

## 2021-01-04 ENCOUNTER — OFFICE VISIT (OUTPATIENT)
Dept: FAMILY MEDICINE CLINIC | Age: 66
End: 2021-01-04
Payer: MEDICARE

## 2021-01-04 VITALS
BODY MASS INDEX: 30.83 KG/M2 | HEIGHT: 76 IN | DIASTOLIC BLOOD PRESSURE: 68 MMHG | RESPIRATION RATE: 17 BRPM | TEMPERATURE: 98 F | SYSTOLIC BLOOD PRESSURE: 118 MMHG | HEART RATE: 75 BPM | WEIGHT: 253.2 LBS

## 2021-01-04 DIAGNOSIS — Z00.00 WELCOME TO MEDICARE PREVENTIVE VISIT: Primary | ICD-10-CM

## 2021-01-04 DIAGNOSIS — Z23 ENCOUNTER FOR IMMUNIZATION: ICD-10-CM

## 2021-01-04 DIAGNOSIS — I10 BENIGN ESSENTIAL HTN: ICD-10-CM

## 2021-01-04 DIAGNOSIS — G47.52 REM SLEEP BEHAVIOR DISORDER: ICD-10-CM

## 2021-01-04 PROCEDURE — 90471 IMMUNIZATION ADMIN: CPT | Performed by: INTERNAL MEDICINE

## 2021-01-04 PROCEDURE — G0402 INITIAL PREVENTIVE EXAM: HCPCS | Performed by: INTERNAL MEDICINE

## 2021-01-04 PROCEDURE — 90732 PPSV23 VACC 2 YRS+ SUBQ/IM: CPT | Performed by: INTERNAL MEDICINE

## 2021-01-04 RX ORDER — CLONAZEPAM 0.5 MG/1
0.5 TABLET ORAL
Qty: 90 TAB | Refills: 1 | Status: SHIPPED | OUTPATIENT
Start: 2021-01-04

## 2021-01-04 RX ORDER — LISINOPRIL 5 MG/1
TABLET ORAL
Qty: 90 TAB | Refills: 3 | Status: SHIPPED | OUTPATIENT
Start: 2021-01-04

## 2021-01-04 NOTE — PROGRESS NOTES
.  This is a \"Welcome to [employer]"  Initial Preventive Physical Examination (IPPE) providing Personalized Prevention Plan Services (Performed in the first 12 months of enrollment)    I have reviewed the patient's medical history in detail and updated the computerized patient record. Depression Risk Screen     3 most recent PHQ Screens 1/4/2021   Little interest or pleasure in doing things Not at all   Feeling down, depressed, irritable, or hopeless Not at all   Total Score PHQ 2 0       Alcohol Risk Screen    Do you average more than 1 drink per night or more than 7 drinks a week: Yes    In the past three months have you have had more than 4 drinks containing alcohol on one occasion: No     Functional Ability and Level of Safety    Diet: No special diet      Hearing: Hearing is good. Vision Screening:  Vision is good. Visual Acuity Screening    Right eye Left eye Both eyes   Without correction:      With correction: 20/15 20/15 20/15         Activities of Daily Living: The home contains: no safety equipment. Patient does total self care      Ambulation: with mild difficulty      Exercise level: sedentary     Fall Risk Screen:  Fall Risk Assessment, last 12 mths 1/4/2021   Able to walk? Yes   Fall in past 12 months? 0   Do you feel unsteady? 0   Are you worried about falling 1   Is the gait abnormal? 0   Number of falls in past 12 months -   Fall with injury? -      Abuse Screen:  Patient is not abused       Screening EKG   EKG order placed: No    End of Life Planning   Advanced care planning directives were discussed with the patient and /or family/caregiver. Assessment/Plan   Education and counseling provided:  Are appropriate based on today's review and evaluation  End-of-Life planning (with patient's consent)  Pneumococcal Vaccine  fall risk associated with benzo use    Diagnoses and all orders for this visit:    1. Welcome to Medicare preventive visit    2.  REM sleep behavior disorder  - clonazePAM (KlonoPIN) 0.5 mg tablet; Take 1 Tab by mouth nightly. Max Daily Amount: 0.5 mg.    3. Encounter for immunization  -     PNEUMOCOCCAL POLYSACCHARIDE VACCINE, 23-VALENT, ADULT OR IMMUNOSUPPRESSED PT DOSE,  -     ADMIN PNEUMOCOCCAL VACCINE      Health Maintenance Due     Health Maintenance Due   Topic Date Due    Pneumococcal 65+ years (1 of 1 - PPSV23) 12/05/2020       Patient Care Team   Patient Care Team:  Paris Wallis MD as PCP - General (Internal Medicine)  Paris Wallis MD as PCP - [facility] Empaneled Provider    History     Past Medical History:   Diagnosis Date    Back pain     BPH associated with nocturia     Bulging of lumbar intervertebral disc     L5 and L4    Colon polyp     Elevated PSA     Esophageal reflux     Glaucoma     In both eyes.  HTN (hypertension)     Hypercholesterolemia     Hypertrophy of prostate without urinary obstruction and other lower urinary tract symptoms (LUTS)     Skin tags, anus or rectum     Unspecified cerebral artery occlusion with cerebral infarction     1991      Past Surgical History:   Procedure Laterality Date    HX APPENDECTOMY      HX HEENT  10/2017    Deviated Septum repair    HX MENISCECTOMY      HX ORTHOPAEDIC      right arm, right knee    HX UROLOGICAL  8/24/07    PNBx-TRUS Vol 10.2 cc's, Benign (16 cores)     HX UROLOGICAL  7/15/13    PNBx-TRUS Vol  72.3 cc's, Benign, Dr. Mckeon Loosen    XR MANDIBLE COMPLETE       Current Outpatient Medications   Medication Sig Dispense Refill    triamterene-hydroCHLOROthiazide (MAXZIDE) 37.5-25 mg per tablet TAKE 1 TABLET DAILY 90 Tab 3    clonazePAM (KlonoPIN) 0.5 mg tablet Take 1 Tab by mouth nightly. Max Daily Amount: 0.5 mg. 90 Tab 1    tamsulosin (FLOMAX) 0.4 mg capsule TAKE 2 CAPSULES DAILY 180 Cap 1    pantoprazole (PROTONIX) 40 mg tablet Take 1 Tab by mouth daily. 90 Tab 3    rosuvastatin (CRESTOR) 20 mg tablet Take 1 Tab by mouth nightly.  90 Tab 3    cholecalciferol (VITAMIN D3) (1000 Units /25 mcg) tablet Take  by mouth daily.     • oxybutynin chloride XL (DITROPAN XL) 10 mg CR tablet Take 1 Tab by mouth daily. 90 Tab 3   • ALPHAGAN P 0.1 % ophthalmic solution      • cyclobenzaprine (FLEXERIL) 10 mg tablet Take 1 Tab by mouth three (3) times daily as needed for Muscle Spasm(s). Indications: muscle spasm 90 Tab 0   • lisinopril (PRINIVIL, ZESTRIL) 5 mg tablet TAKE 1 TABLET DAILY 90 Tab 4   • Cetirizine (ZYRTEC) 10 mg cap Take  by mouth.     • ammonium lactate (AMLACTIN) 12 % topical cream      • HYDROcodone-acetaminophen (NORCO) 5-325 mg per tablet TAKE 1 TABLET BY MOUTH EVERY 4 TO 6 HOURS AS NEEDED FOR PAIN  0   • triamcinolone acetonide (KENALOG) 0.1 % topical cream      • NARCAN 4 mg/actuation nasal spray USE 1 SPRAY IN EACH NOSTRIL MAY REPEAT 2 TO 3 MINUTES UNTIL RMERGENCY ARRIVES  0   • bimatoprost (LUMIGAN) 0.01 % ophthalmic drops Administer 1 Drop to both eyes every evening.     • ipratropium (ATROVENT) 42 mcg (0.06 %) nasal spray 2 Sprays by Both Nostrils route four (4) times daily as needed for Rhinitis. 45 mL 1   • fluticasone (FLONASE) 50 mcg/actuation nasal spray USE 2 SPRAYS IN EACH NOSTRIL DAILY 48 g 2   • MULTIVITAMIN W-MINERALS/LUTEIN (CENTRUM SILVER PO) Take  by mouth.       • aspirin 81 mg tablet Take 81 mg by mouth.         Allergies   Allergen Reactions   • Sulfa (Sulfonamide Antibiotics) Itching       Family History   Problem Relation Age of Onset   • Hypertension Father    • Heart Disease Father    • Glaucoma Mother    • Cancer Neg Hx      Social History     Tobacco Use   • Smoking status: Former Smoker     Quit date: 1989     Years since quittin.7   • Smokeless tobacco: Never Used   Substance Use Topics   • Alcohol use: Yes     Alcohol/week: 6.0 standard drinks     Types: 6 Standard drinks or equivalent per week     Comment: On weekends.

## 2021-01-04 NOTE — PROGRESS NOTES
Upcoming eye exam  For glaucoma  , Dr Kay in March, no ED or urgent care , Dr Dewayne De La Cruz neurosurgery  Memo Walton is a 65 y.o. male (: 1955) presenting to address:    Chief Complaint   Patient presents with   • Annual Wellness Visit       Vitals:    21 0802   BP: 118/68   Pulse: 75   Resp: 17   Temp: 98 °F (36.7 °C)   TempSrc: Temporal   Weight: 253 lb 3.2 oz (114.9 kg)   Height: 6' 4.38\" (1.94 m)   PainSc:   1   PainLoc: Back       Hearing/Vision:      Hearing Screening    125Hz 250Hz 500Hz 1000Hz 2000Hz 3000Hz 4000Hz 6000Hz 8000Hz   Right ear:            Left ear:               Visual Acuity Screening    Right eye Left eye Both eyes   Without correction:      With correction: 20/15 20/15 20/15       Learning Assessment:     Learning Assessment 2015   PRIMARY LEARNER Patient   HIGHEST LEVEL OF EDUCATION - PRIMARY LEARNER  SOME COLLEGE   PRIMARY LANGUAGE ENGLISH   LEARNER PREFERENCE PRIMARY LISTENING   ANSWERED BY patient   RELATIONSHIP SELF     Depression Screening:     3 most recent PHQ Screens 2021   Little interest or pleasure in doing things Not at all   Feeling down, depressed, irritable, or hopeless Not at all   Total Score PHQ 2 0     Fall Risk Assessment:     Fall Risk Assessment, last 12 mths 2021   Able to walk? Yes   Fall in past 12 months? 0   Do you feel unsteady? 0   Are you worried about falling 1   Is the gait abnormal? 0   Number of falls in past 12 months -   Fall with injury? -     Abuse Screening:     Abuse Screening Questionnaire 2021   Do you ever feel afraid of your partner? N   Are you in a relationship with someone who physically or mentally threatens you? N   Is it safe for you to go home? Y     Coordination of Care Questionaire:   1. Have you been to the ER, urgent care clinic since your last visit?  Hospitalized since your last visit?   No     2. Have you seen or consulted any other health care providers outside of the [facility] since your last visit? Include any pap smears or colon screening. yes    Advanced Directive:   1. Do you have an Advanced Directive? No   2. Would you like information on Advanced Directives? No       There are no preventive care reminders to display for this patient. Immunization/s administered 1/4/2021 by Buzz Rollins LPN with guardian's consent. Patient tolerated procedure well. No reactions noted.     pneumo 23 rt deltoid

## 2021-01-04 NOTE — PATIENT INSTRUCTIONS
Medicare Wellness Visit, Male    The best way to live healthy is to have a lifestyle where you eat a well-balanced diet, exercise regularly, limit alcohol use, and quit all forms of tobacco/nicotine, if applicable. Regular preventive services are another way to keep healthy. Preventive services (vaccines, screening tests, monitoring & exams) can help personalize your care plan, which helps you manage your own care. Screening tests can find health problems at the earliest stages, when they are easiest to treat. [facility] follows the current, evidence-based guidelines published by the [facility]on Jolanta ([facility]) when recommending preventive services for our patients. Because we follow these guidelines, sometimes recommendations change over time as research supports it. (For example, a prostate screening blood test is no longer routinely recommended for men with no symptoms). Of course, you and your doctor may decide to screen more often for some diseases, based on your risk and co-morbidities (chronic disease you are already diagnosed with). Preventive services for you include:  - Medicare offers their members a free annual wellness visit, which is time for you and your primary care provider to discuss and plan for your preventive service needs. Take advantage of this benefit every year!  -All adults over age 72 should receive the recommended pneumonia vaccines. Current USPSTF guidelines recommend a series of two vaccines for the best pneumonia protection.   -All adults should have a flu vaccine yearly and tetanus vaccine every 10 years.  -All adults age 48 and older should receive the shingles vaccines (series of two vaccines).        -All adults age 38-68 who are overweight should have a diabetes screening test once every three years.   -Other screening tests & preventive services for persons with diabetes include: an eye exam to screen for diabetic retinopathy, a kidney function test, a foot exam, and stricter control over your cholesterol.   -Cardiovascular screening for adults with routine risk involves an electrocardiogram (ECG) at intervals determined by the provider.   -Colorectal cancer screening should be done for adults age 54-65 with no increased risk factors for colorectal cancer. There are a number of acceptable methods of screening for this type of cancer. Each test has its own benefits and drawbacks. Discuss with your provider what is most appropriate for you during your annual wellness visit. The different tests include: colonoscopy (considered the best screening method), a fecal occult blood test, a fecal DNA test, and sigmoidoscopy.  -All adults born between [facility] should be screened once for Hepatitis C.  -An Abdominal Aortic Aneurysm (AAA) Screening is recommended for men age 73-68 who has ever smoked in their lifetime. Here is a list of your current Health Maintenance items (your personalized list of preventive services) with a due date:  Health Maintenance Due   Topic Date Due    Pneumococcal Vaccine (1 of 1 - PPSV23) 12/05/2020     Risk of Benzodiazepine Long-term use    Risk of cognitive impairment: Benzodiazepines cause drowsiness, delayed reaction time, motor incoordination and forgetfulness. Motor Vehicle Crashes: The risk of driving while on benzodiazepines is the same as driving with a blood alcohol level of 0.05-0.079% (>0.08% is illegal)    Hip fracture: increased risk of hip fracture by 50%    High risk of dependence and addiction.      High risk of accidental overdose and possibly death when combined with other sedating medications (opiods/pain meds, alcohol, muscle relaxants)

## 2021-02-10 DIAGNOSIS — Z79.899 HIGH RISK MEDICATION USE: ICD-10-CM

## 2022-01-13 PROBLEM — K63.5 BENIGN COLONIC POLYP: Status: ACTIVE | Noted: 2022-01-13
